# Patient Record
Sex: MALE | Race: WHITE | NOT HISPANIC OR LATINO | Employment: OTHER | URBAN - METROPOLITAN AREA
[De-identification: names, ages, dates, MRNs, and addresses within clinical notes are randomized per-mention and may not be internally consistent; named-entity substitution may affect disease eponyms.]

---

## 2024-01-01 ENCOUNTER — APPOINTMENT (OUTPATIENT)
Dept: RADIOLOGY | Facility: HOSPITAL | Age: 74
DRG: 871 | End: 2024-01-01
Payer: OTHER MISCELLANEOUS

## 2024-01-01 ENCOUNTER — APPOINTMENT (OUTPATIENT)
Dept: CARDIOLOGY | Facility: HOSPITAL | Age: 74
DRG: 871 | End: 2024-01-01
Payer: OTHER MISCELLANEOUS

## 2024-01-01 ENCOUNTER — HOSPITAL ENCOUNTER (INPATIENT)
Facility: HOSPITAL | Age: 74
LOS: 3 days | End: 2024-07-26
Attending: INTERNAL MEDICINE | Admitting: INTERNAL MEDICINE
Payer: OTHER MISCELLANEOUS

## 2024-01-01 VITALS
BODY MASS INDEX: 21.08 KG/M2 | HEIGHT: 70 IN | DIASTOLIC BLOOD PRESSURE: 67 MMHG | SYSTOLIC BLOOD PRESSURE: 107 MMHG | TEMPERATURE: 98.1 F | WEIGHT: 147.27 LBS | OXYGEN SATURATION: 98 % | HEART RATE: 107 BPM | RESPIRATION RATE: 19 BRPM

## 2024-01-01 DIAGNOSIS — Z51.5 HOSPICE CARE: ICD-10-CM

## 2024-01-01 PROCEDURE — 93970 EXTREMITY STUDY: CPT

## 2024-01-01 PROCEDURE — 99231 SBSQ HOSP IP/OBS SF/LOW 25: CPT

## 2024-01-01 PROCEDURE — 2500000004 HC RX 250 GENERAL PHARMACY W/ HCPCS (ALT 636 FOR OP/ED): Performed by: NURSE PRACTITIONER

## 2024-01-01 PROCEDURE — 2500000004 HC RX 250 GENERAL PHARMACY W/ HCPCS (ALT 636 FOR OP/ED): Performed by: INTERNAL MEDICINE

## 2024-01-01 PROCEDURE — 2500000005 HC RX 250 GENERAL PHARMACY W/O HCPCS: Performed by: NURSE PRACTITIONER

## 2024-01-01 PROCEDURE — 74018 RADEX ABDOMEN 1 VIEW: CPT

## 2024-01-01 PROCEDURE — 93005 ELECTROCARDIOGRAM TRACING: CPT

## 2024-01-01 PROCEDURE — 2500000004 HC RX 250 GENERAL PHARMACY W/ HCPCS (ALT 636 FOR OP/ED)

## 2024-01-01 PROCEDURE — 2500000001 HC RX 250 WO HCPCS SELF ADMINISTERED DRUGS (ALT 637 FOR MEDICARE OP): Performed by: NURSE PRACTITIONER

## 2024-01-01 PROCEDURE — 1150000001 HC HOSPICE PRIVATE ROOM DAILY

## 2024-01-01 PROCEDURE — 99238 HOSP IP/OBS DSCHRG MGMT 30/<: CPT | Performed by: INTERNAL MEDICINE

## 2024-01-01 PROCEDURE — 70450 CT HEAD/BRAIN W/O DYE: CPT

## 2024-01-01 PROCEDURE — 99223 1ST HOSP IP/OBS HIGH 75: CPT | Performed by: NURSE PRACTITIONER

## 2024-01-01 PROCEDURE — 71045 X-RAY EXAM CHEST 1 VIEW: CPT

## 2024-01-01 RX ORDER — GLYCOPYRROLATE 0.2 MG/ML
0.2 INJECTION INTRAMUSCULAR; INTRAVENOUS 4 TIMES DAILY
Status: DISCONTINUED | OUTPATIENT
Start: 2024-01-01 | End: 2024-01-01 | Stop reason: HOSPADM

## 2024-01-01 RX ORDER — MORPHINE SULFATE 2 MG/ML
2 INJECTION, SOLUTION INTRAMUSCULAR; INTRAVENOUS EVERY 2 HOUR PRN
Status: DISCONTINUED | OUTPATIENT
Start: 2024-01-01 | End: 2024-01-01

## 2024-01-01 RX ORDER — MORPHINE SULFATE 2 MG/ML
2 INJECTION, SOLUTION INTRAMUSCULAR; INTRAVENOUS EVERY 6 HOURS
Status: DISCONTINUED | OUTPATIENT
Start: 2024-01-01 | End: 2024-01-01

## 2024-01-01 RX ORDER — ACETAMINOPHEN 325 MG/1
650 TABLET ORAL EVERY 4 HOURS PRN
Status: DISCONTINUED | OUTPATIENT
Start: 2024-01-01 | End: 2024-01-01 | Stop reason: HOSPADM

## 2024-01-01 RX ORDER — LORAZEPAM 2 MG/ML
1 INJECTION INTRAMUSCULAR EVERY 4 HOURS PRN
Status: DISCONTINUED | OUTPATIENT
Start: 2024-01-01 | End: 2024-01-01 | Stop reason: HOSPADM

## 2024-01-01 RX ORDER — LORAZEPAM 2 MG/ML
0.5 INJECTION INTRAMUSCULAR EVERY 4 HOURS
Status: DISCONTINUED | OUTPATIENT
Start: 2024-01-01 | End: 2024-01-01

## 2024-01-01 RX ORDER — HEPARIN SODIUM,PORCINE/PF 10 UNIT/ML
50 SYRINGE (ML) INTRAVENOUS EVERY 12 HOURS
Status: DISCONTINUED | OUTPATIENT
Start: 2024-01-01 | End: 2024-01-01 | Stop reason: HOSPADM

## 2024-01-01 RX ORDER — ACETAMINOPHEN 160 MG/5ML
650 SOLUTION ORAL EVERY 4 HOURS PRN
Status: DISCONTINUED | OUTPATIENT
Start: 2024-01-01 | End: 2024-01-01 | Stop reason: HOSPADM

## 2024-01-01 RX ORDER — MORPHINE SULFATE 2 MG/ML
2 INJECTION, SOLUTION INTRAMUSCULAR; INTRAVENOUS EVERY 8 HOURS
Status: DISCONTINUED | OUTPATIENT
Start: 2024-01-01 | End: 2024-01-01

## 2024-01-01 RX ORDER — LORAZEPAM 2 MG/ML
0.5 INJECTION INTRAMUSCULAR EVERY 6 HOURS SCHEDULED
Status: DISCONTINUED | OUTPATIENT
Start: 2024-01-01 | End: 2024-01-01

## 2024-01-01 RX ORDER — MORPHINE SULFATE 2 MG/ML
2 INJECTION, SOLUTION INTRAMUSCULAR; INTRAVENOUS EVERY 4 HOURS
Status: DISCONTINUED | OUTPATIENT
Start: 2024-01-01 | End: 2024-01-01

## 2024-01-01 RX ORDER — MORPHINE SULFATE 2 MG/ML
2 INJECTION, SOLUTION INTRAMUSCULAR; INTRAVENOUS
Status: DISCONTINUED | OUTPATIENT
Start: 2024-01-01 | End: 2024-01-01 | Stop reason: HOSPADM

## 2024-01-01 RX ORDER — BISACODYL 10 MG/1
10 SUPPOSITORY RECTAL DAILY PRN
Status: DISCONTINUED | OUTPATIENT
Start: 2024-01-01 | End: 2024-01-01 | Stop reason: HOSPADM

## 2024-01-01 RX ORDER — SCOLOPAMINE TRANSDERMAL SYSTEM 1 MG/1
1 PATCH, EXTENDED RELEASE TRANSDERMAL
Status: DISCONTINUED | OUTPATIENT
Start: 2024-01-01 | End: 2024-01-01 | Stop reason: HOSPADM

## 2024-01-01 RX ORDER — HYOSCYAMINE SULFATE 0.5 MG/ML
0.25 INJECTION, SOLUTION SUBCUTANEOUS ONCE
Status: DISCONTINUED | OUTPATIENT
Start: 2024-01-01 | End: 2024-01-01

## 2024-01-01 RX ORDER — LORAZEPAM 2 MG/ML
0.5 INJECTION INTRAMUSCULAR EVERY 4 HOURS PRN
Status: DISCONTINUED | OUTPATIENT
Start: 2024-01-01 | End: 2024-01-01

## 2024-01-01 RX ORDER — BISACODYL 10 MG/1
10 SUPPOSITORY RECTAL ONCE
Status: COMPLETED | OUTPATIENT
Start: 2024-01-01 | End: 2024-01-01

## 2024-01-01 RX ORDER — ACETAMINOPHEN 650 MG/1
650 SUPPOSITORY RECTAL EVERY 4 HOURS PRN
Status: DISCONTINUED | OUTPATIENT
Start: 2024-01-01 | End: 2024-01-01 | Stop reason: HOSPADM

## 2024-01-01 RX ORDER — MORPHINE SULFATE 4 MG/ML
4 INJECTION INTRAVENOUS EVERY 4 HOURS
Status: DISCONTINUED | OUTPATIENT
Start: 2024-01-01 | End: 2024-01-01 | Stop reason: HOSPADM

## 2024-01-01 RX ORDER — HALOPERIDOL 2 MG/ML
1 SOLUTION ORAL EVERY 4 HOURS PRN
Status: DISCONTINUED | OUTPATIENT
Start: 2024-01-01 | End: 2024-01-01 | Stop reason: HOSPADM

## 2024-01-01 RX ORDER — HEPARIN SODIUM,PORCINE/PF 10 UNIT/ML
50 SYRINGE (ML) INTRAVENOUS AS NEEDED
Status: DISCONTINUED | OUTPATIENT
Start: 2024-01-01 | End: 2024-01-01 | Stop reason: HOSPADM

## 2024-01-01 RX ORDER — HYOSCYAMINE SULFATE 0.5 MG/ML
0.25 INJECTION, SOLUTION SUBCUTANEOUS EVERY 4 HOURS PRN
Status: DISCONTINUED | OUTPATIENT
Start: 2024-01-01 | End: 2024-01-01

## 2024-01-01 RX ORDER — LORAZEPAM 2 MG/ML
1 INJECTION INTRAMUSCULAR EVERY 4 HOURS
Status: DISCONTINUED | OUTPATIENT
Start: 2024-01-01 | End: 2024-01-01 | Stop reason: HOSPADM

## 2024-01-01 SDOH — SOCIAL STABILITY: SOCIAL INSECURITY: ARE THERE ANY APPARENT SIGNS OF INJURIES/BEHAVIORS THAT COULD BE RELATED TO ABUSE/NEGLECT?: UNABLE TO ASSESS

## 2024-01-01 SDOH — SOCIAL STABILITY: SOCIAL INSECURITY: ABUSE: ADULT

## 2024-01-01 SDOH — SOCIAL STABILITY: SOCIAL INSECURITY: HAS ANYONE EVER THREATENED TO HURT YOUR FAMILY OR YOUR PETS?: UNABLE TO ASSESS

## 2024-01-01 SDOH — SOCIAL STABILITY: SOCIAL INSECURITY: DOES ANYONE TRY TO KEEP YOU FROM HAVING/CONTACTING OTHER FRIENDS OR DOING THINGS OUTSIDE YOUR HOME?: UNABLE TO ASSESS

## 2024-01-01 SDOH — SOCIAL STABILITY: SOCIAL INSECURITY: HAVE YOU HAD ANY THOUGHTS OF HARMING ANYONE ELSE?: UNABLE TO ASSESS

## 2024-01-01 SDOH — SOCIAL STABILITY: SOCIAL INSECURITY: ARE YOU OR HAVE YOU BEEN THREATENED OR ABUSED PHYSICALLY, EMOTIONALLY, OR SEXUALLY BY ANYONE?: UNABLE TO ASSESS

## 2024-01-01 SDOH — SOCIAL STABILITY: SOCIAL INSECURITY: WERE YOU ABLE TO COMPLETE ALL THE BEHAVIORAL HEALTH SCREENINGS?: NO

## 2024-01-01 SDOH — SOCIAL STABILITY: SOCIAL INSECURITY: DO YOU FEEL UNSAFE GOING BACK TO THE PLACE WHERE YOU ARE LIVING?: UNABLE TO ASSESS

## 2024-01-01 SDOH — SOCIAL STABILITY: SOCIAL INSECURITY: DO YOU FEEL ANYONE HAS EXPLOITED OR TAKEN ADVANTAGE OF YOU FINANCIALLY OR OF YOUR PERSONAL PROPERTY?: UNABLE TO ASSESS

## 2024-01-01 SDOH — SOCIAL STABILITY: SOCIAL INSECURITY: HAVE YOU HAD THOUGHTS OF HARMING ANYONE ELSE?: UNABLE TO ASSESS

## 2024-01-01 ASSESSMENT — PAIN SCALES - PAIN ASSESSMENT IN ADVANCED DEMENTIA (PAINAD)
BODYLANGUAGE: TENSE, DISTRESSED PACING, FIDGETING
CONSOLABILITY: UNABLE TO CONSOLE, DISTRACT OR REASSURE
TOTALSCORE: 4
TOTALSCORE: MEDICATION (SEE MAR)
FACIALEXPRESSION: SMILING OR INEXPRESSIVE
BREATHING: NORMAL
CONSOLABILITY: UNABLE TO CONSOLE, DISTRACT OR REASSURE
BODYLANGUAGE: TENSE, DISTRESSED PACING, FIDGETING
TOTALSCORE: 4
BREATHING: OCCASIONAL LABORED BREATHING, SHORT PERIOD OF HYPERVENTILATION
FACIALEXPRESSION: SMILING OR INEXPRESSIVE
BREATHING: OCCASIONAL LABORED BREATHING, SHORT PERIOD OF HYPERVENTILATION
BODYLANGUAGE: RELAXED
CONSOLABILITY: NO NEED TO CONSOLE
TOTALSCORE: 0
FACIALEXPRESSION: SMILING OR INEXPRESSIVE
TOTALSCORE: MEDICATION (SEE MAR)

## 2024-01-01 ASSESSMENT — PAIN - FUNCTIONAL ASSESSMENT
PAIN_FUNCTIONAL_ASSESSMENT: UNABLE TO SELF-REPORT
PAIN_FUNCTIONAL_ASSESSMENT: 0-10
PAIN_FUNCTIONAL_ASSESSMENT: 0-10

## 2024-01-01 ASSESSMENT — COGNITIVE AND FUNCTIONAL STATUS - GENERAL
CLIMB 3 TO 5 STEPS WITH RAILING: TOTAL
STANDING UP FROM CHAIR USING ARMS: TOTAL
TURNING FROM BACK TO SIDE WHILE IN FLAT BAD: TOTAL
TOILETING: TOTAL
HELP NEEDED FOR BATHING: TOTAL
PERSONAL GROOMING: TOTAL
MOVING FROM LYING ON BACK TO SITTING ON SIDE OF FLAT BED WITH BEDRAILS: TOTAL
DRESSING REGULAR LOWER BODY CLOTHING: TOTAL
DAILY ACTIVITIY SCORE: 6
MOBILITY SCORE: 6
PERSONAL GROOMING: TOTAL
TOILETING: TOTAL
DRESSING REGULAR UPPER BODY CLOTHING: TOTAL
DAILY ACTIVITIY SCORE: 6
WALKING IN HOSPITAL ROOM: TOTAL
PERSONAL GROOMING: TOTAL
EATING MEALS: TOTAL
DRESSING REGULAR UPPER BODY CLOTHING: TOTAL
EATING MEALS: TOTAL
TOILETING: TOTAL
MOVING TO AND FROM BED TO CHAIR: TOTAL
TOILETING: TOTAL
STANDING UP FROM CHAIR USING ARMS: TOTAL
STANDING UP FROM CHAIR USING ARMS: TOTAL
CLIMB 3 TO 5 STEPS WITH RAILING: TOTAL
DRESSING REGULAR LOWER BODY CLOTHING: TOTAL
HELP NEEDED FOR BATHING: TOTAL
MOVING TO AND FROM BED TO CHAIR: TOTAL
CLIMB 3 TO 5 STEPS WITH RAILING: TOTAL
MOVING FROM LYING ON BACK TO SITTING ON SIDE OF FLAT BED WITH BEDRAILS: TOTAL
MOVING FROM LYING ON BACK TO SITTING ON SIDE OF FLAT BED WITH BEDRAILS: TOTAL
TOILETING: TOTAL
TURNING FROM BACK TO SIDE WHILE IN FLAT BAD: TOTAL
MOVING FROM LYING ON BACK TO SITTING ON SIDE OF FLAT BED WITH BEDRAILS: TOTAL
MOVING FROM LYING ON BACK TO SITTING ON SIDE OF FLAT BED WITH BEDRAILS: TOTAL
MOBILITY SCORE: 6
WALKING IN HOSPITAL ROOM: TOTAL
DRESSING REGULAR LOWER BODY CLOTHING: TOTAL
DAILY ACTIVITIY SCORE: 6
DRESSING REGULAR UPPER BODY CLOTHING: TOTAL
DRESSING REGULAR LOWER BODY CLOTHING: TOTAL
CLIMB 3 TO 5 STEPS WITH RAILING: TOTAL
WALKING IN HOSPITAL ROOM: TOTAL
DRESSING REGULAR UPPER BODY CLOTHING: TOTAL
DAILY ACTIVITIY SCORE: 6
MOVING FROM LYING ON BACK TO SITTING ON SIDE OF FLAT BED WITH BEDRAILS: TOTAL
DRESSING REGULAR LOWER BODY CLOTHING: TOTAL
MOBILITY SCORE: 6
MOVING TO AND FROM BED TO CHAIR: TOTAL
HELP NEEDED FOR BATHING: TOTAL
WALKING IN HOSPITAL ROOM: TOTAL
TURNING FROM BACK TO SIDE WHILE IN FLAT BAD: TOTAL
DRESSING REGULAR UPPER BODY CLOTHING: TOTAL
TURNING FROM BACK TO SIDE WHILE IN FLAT BAD: TOTAL
TURNING FROM BACK TO SIDE WHILE IN FLAT BAD: TOTAL
MOVING FROM LYING ON BACK TO SITTING ON SIDE OF FLAT BED WITH BEDRAILS: TOTAL
DRESSING REGULAR UPPER BODY CLOTHING: TOTAL
CLIMB 3 TO 5 STEPS WITH RAILING: TOTAL
STANDING UP FROM CHAIR USING ARMS: TOTAL
MOVING TO AND FROM BED TO CHAIR: TOTAL
WALKING IN HOSPITAL ROOM: TOTAL
HELP NEEDED FOR BATHING: TOTAL
DAILY ACTIVITIY SCORE: 6
CLIMB 3 TO 5 STEPS WITH RAILING: TOTAL
PERSONAL GROOMING: TOTAL
TOILETING: TOTAL
HELP NEEDED FOR BATHING: TOTAL
TURNING FROM BACK TO SIDE WHILE IN FLAT BAD: TOTAL
EATING MEALS: TOTAL
WALKING IN HOSPITAL ROOM: TOTAL
PERSONAL GROOMING: TOTAL
PERSONAL GROOMING: TOTAL
TOILETING: TOTAL
DAILY ACTIVITIY SCORE: 6
DRESSING REGULAR LOWER BODY CLOTHING: TOTAL
MOVING TO AND FROM BED TO CHAIR: TOTAL
EATING MEALS: TOTAL
WALKING IN HOSPITAL ROOM: TOTAL
MOBILITY SCORE: 6
MOVING TO AND FROM BED TO CHAIR: TOTAL
PERSONAL GROOMING: TOTAL
DAILY ACTIVITIY SCORE: 6
STANDING UP FROM CHAIR USING ARMS: TOTAL
EATING MEALS: TOTAL
EATING MEALS: TOTAL
STANDING UP FROM CHAIR USING ARMS: TOTAL
PATIENT BASELINE BEDBOUND: YES
MOBILITY SCORE: 6
HELP NEEDED FOR BATHING: TOTAL
DRESSING REGULAR UPPER BODY CLOTHING: TOTAL
HELP NEEDED FOR BATHING: TOTAL
DRESSING REGULAR LOWER BODY CLOTHING: TOTAL
CLIMB 3 TO 5 STEPS WITH RAILING: TOTAL
MOVING TO AND FROM BED TO CHAIR: TOTAL
EATING MEALS: TOTAL
STANDING UP FROM CHAIR USING ARMS: TOTAL
MOBILITY SCORE: 6
MOBILITY SCORE: 6
TURNING FROM BACK TO SIDE WHILE IN FLAT BAD: TOTAL

## 2024-01-01 ASSESSMENT — ACTIVITIES OF DAILY LIVING (ADL)
GROOMING: DEPENDENT
BATHING: DEPENDENT
WALKS IN HOME: DEPENDENT
DRESSING YOURSELF: DEPENDENT
HEARING - RIGHT EAR: UNABLE TO ASSESS
PATIENT'S MEMORY ADEQUATE TO SAFELY COMPLETE DAILY ACTIVITIES?: NO
JUDGMENT_ADEQUATE_SAFELY_COMPLETE_DAILY_ACTIVITIES: NO
HEARING - LEFT EAR: UNABLE TO ASSESS
LACK_OF_TRANSPORTATION: NO
ADEQUATE_TO_COMPLETE_ADL: UNABLE TO ASSESS
TOILETING: DEPENDENT
FEEDING YOURSELF: DEPENDENT

## 2024-01-01 ASSESSMENT — PATIENT HEALTH QUESTIONNAIRE - PHQ9
1. LITTLE INTEREST OR PLEASURE IN DOING THINGS: NOT AT ALL
2. FEELING DOWN, DEPRESSED OR HOPELESS: NOT AT ALL
SUM OF ALL RESPONSES TO PHQ9 QUESTIONS 1 & 2: 0

## 2024-01-01 ASSESSMENT — PAIN SCALES - WONG BAKER
WONGBAKER_NUMERICALRESPONSE: NO HURT
WONGBAKER_NUMERICALRESPONSE: NO HURT

## 2024-01-01 ASSESSMENT — PAIN DESCRIPTION - LOCATION: LOCATION: GENERALIZED

## 2024-01-01 ASSESSMENT — PAIN SCALES - GENERAL
PAINLEVEL_OUTOF10: 0 - NO PAIN

## 2024-01-01 ASSESSMENT — COLUMBIA-SUICIDE SEVERITY RATING SCALE - C-SSRS
6. HAVE YOU EVER DONE ANYTHING, STARTED TO DO ANYTHING, OR PREPARED TO DO ANYTHING TO END YOUR LIFE?: NO
2. HAVE YOU ACTUALLY HAD ANY THOUGHTS OF KILLING YOURSELF?: NO
1. IN THE PAST MONTH, HAVE YOU WISHED YOU WERE DEAD OR WISHED YOU COULD GO TO SLEEP AND NOT WAKE UP?: NO

## 2024-01-01 ASSESSMENT — LIFESTYLE VARIABLES
AUDIT-C TOTAL SCORE: -1
AUDIT-C TOTAL SCORE: -1
SKIP TO QUESTIONS 9-10: 0
HOW OFTEN DO YOU HAVE A DRINK CONTAINING ALCOHOL: PATIENT UNABLE TO ANSWER
HOW OFTEN DO YOU HAVE 6 OR MORE DRINKS ON ONE OCCASION: PATIENT UNABLE TO ANSWER
HOW MANY STANDARD DRINKS CONTAINING ALCOHOL DO YOU HAVE ON A TYPICAL DAY: PATIENT UNABLE TO ANSWER

## 2024-07-13 ENCOUNTER — APPOINTMENT (OUTPATIENT)
Dept: RADIOLOGY | Facility: HOSPITAL | Age: 74
DRG: 871 | End: 2024-07-13
Payer: OTHER MISCELLANEOUS

## 2024-07-13 ENCOUNTER — HOSPITAL ENCOUNTER (INPATIENT)
Facility: HOSPITAL | Age: 74
DRG: 871 | End: 2024-07-13
Attending: STUDENT IN AN ORGANIZED HEALTH CARE EDUCATION/TRAINING PROGRAM | Admitting: INTERNAL MEDICINE
Payer: OTHER MISCELLANEOUS

## 2024-07-13 DIAGNOSIS — R93.89 ABNORMAL CT SCAN: ICD-10-CM

## 2024-07-13 DIAGNOSIS — Z51.5 HOSPICE CARE: ICD-10-CM

## 2024-07-13 DIAGNOSIS — J18.9 PNEUMONIA DUE TO INFECTIOUS ORGANISM, UNSPECIFIED LATERALITY, UNSPECIFIED PART OF LUNG: ICD-10-CM

## 2024-07-13 DIAGNOSIS — A41.9 SEPSIS, DUE TO UNSPECIFIED ORGANISM, UNSPECIFIED WHETHER ACUTE ORGAN DYSFUNCTION PRESENT (MULTI): ICD-10-CM

## 2024-07-13 DIAGNOSIS — E27.9 LESION OF ADRENAL GLAND (MULTI): ICD-10-CM

## 2024-07-13 DIAGNOSIS — O22.30 DVT (DEEP VEIN THROMBOSIS) IN PREGNANCY (HHS-HCC): ICD-10-CM

## 2024-07-13 DIAGNOSIS — E87.0 HYPERNATREMIA: ICD-10-CM

## 2024-07-13 DIAGNOSIS — J96.91 RESPIRATORY FAILURE WITH HYPOXIA, UNSPECIFIED CHRONICITY (MULTI): ICD-10-CM

## 2024-07-13 DIAGNOSIS — I26.99 OTHER ACUTE PULMONARY EMBOLISM WITHOUT ACUTE COR PULMONALE: ICD-10-CM

## 2024-07-13 DIAGNOSIS — R91.8 LUNG MASS: ICD-10-CM

## 2024-07-13 DIAGNOSIS — R41.82 ALTERED MENTAL STATUS, UNSPECIFIED ALTERED MENTAL STATUS TYPE: ICD-10-CM

## 2024-07-13 LAB
ALBUMIN SERPL BCP-MCNC: 3.3 G/DL (ref 3.4–5)
ALP SERPL-CCNC: 131 U/L (ref 33–136)
ALT SERPL W P-5'-P-CCNC: 36 U/L (ref 10–52)
ANION GAP BLDA CALCULATED.4IONS-SCNC: 9 MMO/L (ref 10–25)
ANION GAP SERPL CALC-SCNC: 19 MMOL/L (ref 10–20)
APPEARANCE UR: ABNORMAL
AST SERPL W P-5'-P-CCNC: 43 U/L (ref 9–39)
BACTERIA #/AREA URNS AUTO: ABNORMAL /HPF
BASE EXCESS BLDA CALC-SCNC: -1.1 MMOL/L (ref -2–3)
BASE EXCESS BLDA CALC-SCNC: -3.9 MMOL/L (ref -2–3)
BASOPHILS # BLD AUTO: 0.06 X10*3/UL (ref 0–0.1)
BASOPHILS NFR BLD AUTO: 0.5 %
BILIRUB SERPL-MCNC: 0.6 MG/DL (ref 0–1.2)
BILIRUB UR STRIP.AUTO-MCNC: NEGATIVE MG/DL
BNP SERPL-MCNC: 105 PG/ML (ref 0–99)
BODY TEMPERATURE: ABNORMAL
BODY TEMPERATURE: ABNORMAL
BUN SERPL-MCNC: 96 MG/DL (ref 6–23)
CA-I BLDA-SCNC: 1.25 MMOL/L (ref 1.1–1.33)
CALCIUM SERPL-MCNC: 9.1 MG/DL (ref 8.6–10.3)
CARDIAC TROPONIN I PNL SERPL HS: 36 NG/L (ref 0–20)
CHLORIDE BLDA-SCNC: 132 MMOL/L (ref 98–107)
CHLORIDE SERPL-SCNC: 125 MMOL/L (ref 98–107)
CO2 SERPL-SCNC: 25 MMOL/L (ref 21–32)
COLOR UR: ABNORMAL
CREAT SERPL-MCNC: 4.14 MG/DL (ref 0.5–1.3)
D DIMER PPP FEU-MCNC: 1808 NG/ML FEU
EGFRCR SERPLBLD CKD-EPI 2021: 14 ML/MIN/1.73M*2
EOSINOPHIL # BLD AUTO: 0.11 X10*3/UL (ref 0–0.4)
EOSINOPHIL NFR BLD AUTO: 0.9 %
ERYTHROCYTE [DISTWIDTH] IN BLOOD BY AUTOMATED COUNT: 18.2 % (ref 11.5–14.5)
FIBRINOGEN PPP-MCNC: 510 MG/DL (ref 200–400)
FLUAV RNA RESP QL NAA+PROBE: NOT DETECTED
FLUBV RNA RESP QL NAA+PROBE: NOT DETECTED
GLUCOSE BLDA-MCNC: 152 MG/DL (ref 74–99)
GLUCOSE SERPL-MCNC: 132 MG/DL (ref 74–99)
GLUCOSE UR STRIP.AUTO-MCNC: NORMAL MG/DL
HCO3 BLDA-SCNC: 22.2 MMOL/L (ref 22–26)
HCO3 BLDA-SCNC: 24.8 MMOL/L (ref 22–26)
HCT VFR BLD AUTO: 43.8 % (ref 41–52)
HCT VFR BLD EST: 39 % (ref 41–52)
HGB BLD-MCNC: 12.7 G/DL (ref 13.5–17.5)
HGB BLDA-MCNC: 12.9 G/DL (ref 13.5–17.5)
HYALINE CASTS #/AREA URNS AUTO: ABNORMAL /LPF
IMM GRANULOCYTES # BLD AUTO: 0.04 X10*3/UL (ref 0–0.5)
IMM GRANULOCYTES NFR BLD AUTO: 0.3 % (ref 0–0.9)
INHALED O2 CONCENTRATION: 100 %
INHALED O2 CONCENTRATION: 80 %
INR PPP: 1.1 (ref 0.9–1.1)
KETONES UR STRIP.AUTO-MCNC: NEGATIVE MG/DL
LACTATE BLDA-SCNC: 1.5 MMOL/L (ref 0.4–2)
LACTATE SERPL-SCNC: 1.9 MMOL/L (ref 0.4–2)
LEUKOCYTE ESTERASE UR QL STRIP.AUTO: ABNORMAL
LYMPHOCYTES # BLD AUTO: 2.91 X10*3/UL (ref 0.8–3)
LYMPHOCYTES NFR BLD AUTO: 23 %
MCH RBC QN AUTO: 29.2 PG (ref 26–34)
MCHC RBC AUTO-ENTMCNC: 29 G/DL (ref 32–36)
MCV RBC AUTO: 101 FL (ref 80–100)
MONOCYTES # BLD AUTO: 0.91 X10*3/UL (ref 0.05–0.8)
MONOCYTES NFR BLD AUTO: 7.2 %
MUCOUS THREADS #/AREA URNS AUTO: ABNORMAL /LPF
NEUTROPHILS # BLD AUTO: 8.6 X10*3/UL (ref 1.6–5.5)
NEUTROPHILS NFR BLD AUTO: 68.1 %
NITRITE UR QL STRIP.AUTO: NEGATIVE
NRBC BLD-RTO: 0 /100 WBCS (ref 0–0)
OXYHGB MFR BLDA: 88.5 % (ref 94–98)
OXYHGB MFR BLDA: 97.4 % (ref 94–98)
PCO2 BLDA: 43 MM HG (ref 38–42)
PCO2 BLDA: 45 MM HG (ref 38–42)
PH BLDA: 7.32 PH (ref 7.38–7.42)
PH BLDA: 7.35 PH (ref 7.38–7.42)
PH UR STRIP.AUTO: 5.5 [PH]
PLATELET # BLD AUTO: 230 X10*3/UL (ref 150–450)
PO2 BLDA: 187 MM HG (ref 85–95)
PO2 BLDA: 65 MM HG (ref 85–95)
POTASSIUM BLDA-SCNC: 4.7 MMOL/L (ref 3.5–5.3)
POTASSIUM SERPL-SCNC: 4.3 MMOL/L (ref 3.5–5.3)
PROT SERPL-MCNC: 7.4 G/DL (ref 6.4–8.2)
PROT UR STRIP.AUTO-MCNC: ABNORMAL MG/DL
PROTHROMBIN TIME: 12.9 SECONDS (ref 9.8–12.8)
RBC # BLD AUTO: 4.35 X10*6/UL (ref 4.5–5.9)
RBC # UR STRIP.AUTO: ABNORMAL /UL
RBC #/AREA URNS AUTO: >20 /HPF
SAO2 % BLDA: 100 % (ref 94–100)
SAO2 % BLDA: 91 % (ref 94–100)
SARS-COV-2 RNA RESP QL NAA+PROBE: DETECTED
SODIUM BLDA-SCNC: 161 MMOL/L (ref 136–145)
SODIUM SERPL-SCNC: 165 MMOL/L (ref 136–145)
SP GR UR STRIP.AUTO: 1.02
SQUAMOUS #/AREA URNS AUTO: ABNORMAL /HPF
UROBILINOGEN UR STRIP.AUTO-MCNC: NORMAL MG/DL
WBC # BLD AUTO: 12.6 X10*3/UL (ref 4.4–11.3)
WBC #/AREA URNS AUTO: >50 /HPF

## 2024-07-13 PROCEDURE — 87636 SARSCOV2 & INF A&B AMP PRB: CPT | Performed by: STUDENT IN AN ORGANIZED HEALTH CARE EDUCATION/TRAINING PROGRAM

## 2024-07-13 PROCEDURE — 70450 CT HEAD/BRAIN W/O DYE: CPT | Performed by: STUDENT IN AN ORGANIZED HEALTH CARE EDUCATION/TRAINING PROGRAM

## 2024-07-13 PROCEDURE — 2020000001 HC ICU ROOM DAILY

## 2024-07-13 PROCEDURE — 2500000004 HC RX 250 GENERAL PHARMACY W/ HCPCS (ALT 636 FOR OP/ED)

## 2024-07-13 PROCEDURE — 99291 CRITICAL CARE FIRST HOUR: CPT

## 2024-07-13 PROCEDURE — 99291 CRITICAL CARE FIRST HOUR: CPT | Performed by: STUDENT IN AN ORGANIZED HEALTH CARE EDUCATION/TRAINING PROGRAM

## 2024-07-13 PROCEDURE — 5A0945A ASSISTANCE WITH RESPIRATORY VENTILATION, 24-96 CONSECUTIVE HOURS, HIGH NASAL FLOW/VELOCITY: ICD-10-PCS | Performed by: PHYSICIAN ASSISTANT

## 2024-07-13 PROCEDURE — 83735 ASSAY OF MAGNESIUM: CPT

## 2024-07-13 PROCEDURE — 36415 COLL VENOUS BLD VENIPUNCTURE: CPT | Performed by: PHYSICIAN ASSISTANT

## 2024-07-13 PROCEDURE — 94760 N-INVAS EAR/PLS OXIMETRY 1: CPT

## 2024-07-13 PROCEDURE — 84145 PROCALCITONIN (PCT): CPT | Mod: GEALAB

## 2024-07-13 PROCEDURE — 2500000002 HC RX 250 W HCPCS SELF ADMINISTERED DRUGS (ALT 637 FOR MEDICARE OP, ALT 636 FOR OP/ED): Performed by: STUDENT IN AN ORGANIZED HEALTH CARE EDUCATION/TRAINING PROGRAM

## 2024-07-13 PROCEDURE — 2500000004 HC RX 250 GENERAL PHARMACY W/ HCPCS (ALT 636 FOR OP/ED): Performed by: PHYSICIAN ASSISTANT

## 2024-07-13 PROCEDURE — 87449 NOS EACH ORGANISM AG IA: CPT | Mod: GEALAB

## 2024-07-13 PROCEDURE — 83605 ASSAY OF LACTIC ACID: CPT | Performed by: PHYSICIAN ASSISTANT

## 2024-07-13 PROCEDURE — 87081 CULTURE SCREEN ONLY: CPT | Mod: GEALAB

## 2024-07-13 PROCEDURE — 87086 URINE CULTURE/COLONY COUNT: CPT | Mod: GEALAB | Performed by: PHYSICIAN ASSISTANT

## 2024-07-13 PROCEDURE — 87040 BLOOD CULTURE FOR BACTERIA: CPT | Mod: GEALAB | Performed by: PHYSICIAN ASSISTANT

## 2024-07-13 PROCEDURE — 2500000004 HC RX 250 GENERAL PHARMACY W/ HCPCS (ALT 636 FOR OP/ED): Performed by: STUDENT IN AN ORGANIZED HEALTH CARE EDUCATION/TRAINING PROGRAM

## 2024-07-13 PROCEDURE — 94762 N-INVAS EAR/PLS OXIMTRY CONT: CPT

## 2024-07-13 PROCEDURE — 2500000005 HC RX 250 GENERAL PHARMACY W/O HCPCS: Performed by: PHYSICIAN ASSISTANT

## 2024-07-13 PROCEDURE — 83935 ASSAY OF URINE OSMOLALITY: CPT | Mod: GEALAB

## 2024-07-13 PROCEDURE — 2500000005 HC RX 250 GENERAL PHARMACY W/O HCPCS: Performed by: STUDENT IN AN ORGANIZED HEALTH CARE EDUCATION/TRAINING PROGRAM

## 2024-07-13 PROCEDURE — 82436 ASSAY OF URINE CHLORIDE: CPT | Mod: GEALAB

## 2024-07-13 PROCEDURE — 85025 COMPLETE CBC W/AUTO DIFF WBC: CPT | Performed by: PHYSICIAN ASSISTANT

## 2024-07-13 PROCEDURE — 80069 RENAL FUNCTION PANEL: CPT | Mod: CCI

## 2024-07-13 PROCEDURE — 85379 FIBRIN DEGRADATION QUANT: CPT

## 2024-07-13 PROCEDURE — 84132 ASSAY OF SERUM POTASSIUM: CPT | Performed by: PHYSICIAN ASSISTANT

## 2024-07-13 PROCEDURE — 84484 ASSAY OF TROPONIN QUANT: CPT

## 2024-07-13 PROCEDURE — 36600 WITHDRAWAL OF ARTERIAL BLOOD: CPT

## 2024-07-13 PROCEDURE — XW033E5 INTRODUCTION OF REMDESIVIR ANTI-INFECTIVE INTO PERIPHERAL VEIN, PERCUTANEOUS APPROACH, NEW TECHNOLOGY GROUP 5: ICD-10-PCS

## 2024-07-13 PROCEDURE — 81003 URINALYSIS AUTO W/O SCOPE: CPT | Performed by: PHYSICIAN ASSISTANT

## 2024-07-13 PROCEDURE — 84100 ASSAY OF PHOSPHORUS: CPT

## 2024-07-13 PROCEDURE — 96365 THER/PROPH/DIAG IV INF INIT: CPT

## 2024-07-13 PROCEDURE — 84484 ASSAY OF TROPONIN QUANT: CPT | Performed by: STUDENT IN AN ORGANIZED HEALTH CARE EDUCATION/TRAINING PROGRAM

## 2024-07-13 PROCEDURE — 83880 ASSAY OF NATRIURETIC PEPTIDE: CPT | Performed by: STUDENT IN AN ORGANIZED HEALTH CARE EDUCATION/TRAINING PROGRAM

## 2024-07-13 PROCEDURE — 94660 CPAP INITIATION&MGMT: CPT

## 2024-07-13 PROCEDURE — 96374 THER/PROPH/DIAG INJ IV PUSH: CPT | Mod: 59

## 2024-07-13 PROCEDURE — 71250 CT THORAX DX C-: CPT

## 2024-07-13 PROCEDURE — 85384 FIBRINOGEN ACTIVITY: CPT

## 2024-07-13 PROCEDURE — 94640 AIRWAY INHALATION TREATMENT: CPT

## 2024-07-13 PROCEDURE — 71250 CT THORAX DX C-: CPT | Performed by: STUDENT IN AN ORGANIZED HEALTH CARE EDUCATION/TRAINING PROGRAM

## 2024-07-13 PROCEDURE — 87899 AGENT NOS ASSAY W/OPTIC: CPT | Mod: GEALAB

## 2024-07-13 PROCEDURE — 9420000001 HC RT PATIENT EDUCATION 5 MIN

## 2024-07-13 PROCEDURE — 82805 BLOOD GASES W/O2 SATURATION: CPT | Performed by: PHYSICIAN ASSISTANT

## 2024-07-13 PROCEDURE — 85610 PROTHROMBIN TIME: CPT

## 2024-07-13 PROCEDURE — 37799 UNLISTED PX VASCULAR SURGERY: CPT

## 2024-07-13 PROCEDURE — 87641 MR-STAPH DNA AMP PROBE: CPT

## 2024-07-13 PROCEDURE — 3E0333Z INTRODUCTION OF ANTI-INFLAMMATORY INTO PERIPHERAL VEIN, PERCUTANEOUS APPROACH: ICD-10-PCS

## 2024-07-13 RX ORDER — ACETAMINOPHEN 650 MG/1
650 SUPPOSITORY RECTAL EVERY 4 HOURS PRN
Status: DISCONTINUED | OUTPATIENT
Start: 2024-07-13 | End: 2024-07-23 | Stop reason: HOSPADM

## 2024-07-13 RX ORDER — HEPARIN SODIUM 5000 [USP'U]/ML
5000 INJECTION, SOLUTION INTRAVENOUS; SUBCUTANEOUS EVERY 8 HOURS
Status: DISCONTINUED | OUTPATIENT
Start: 2024-07-13 | End: 2024-07-23 | Stop reason: HOSPADM

## 2024-07-13 RX ORDER — BISACODYL 10 MG/1
10 SUPPOSITORY RECTAL DAILY PRN
Status: DISCONTINUED | OUTPATIENT
Start: 2024-07-13 | End: 2024-07-23 | Stop reason: HOSPADM

## 2024-07-13 RX ORDER — DEXAMETHASONE 6 MG/1
6 TABLET ORAL DAILY
Status: CANCELLED | OUTPATIENT
Start: 2024-07-13 | End: 2024-07-24

## 2024-07-13 RX ORDER — ROSUVASTATIN CALCIUM 10 MG/1
10 TABLET, COATED ORAL DAILY
COMMUNITY
End: 2024-07-26 | Stop reason: HOSPADM

## 2024-07-13 RX ORDER — POTASSIUM CHLORIDE 20 MEQ/1
20 TABLET, EXTENDED RELEASE ORAL 2 TIMES DAILY
COMMUNITY
End: 2024-07-26 | Stop reason: HOSPADM

## 2024-07-13 RX ORDER — TAMSULOSIN HYDROCHLORIDE 0.4 MG/1
0.4 CAPSULE ORAL NIGHTLY
Status: DISCONTINUED | OUTPATIENT
Start: 2024-07-14 | End: 2024-07-23 | Stop reason: HOSPADM

## 2024-07-13 RX ORDER — DEXAMETHASONE SODIUM PHOSPHATE 10 MG/ML
6 INJECTION INTRAMUSCULAR; INTRAVENOUS DAILY
Status: CANCELLED | OUTPATIENT
Start: 2024-07-13 | End: 2024-07-24

## 2024-07-13 RX ORDER — ADHESIVE BANDAGE
30 BANDAGE TOPICAL DAILY PRN
COMMUNITY
End: 2024-07-26 | Stop reason: HOSPADM

## 2024-07-13 RX ORDER — BISACODYL 10 MG/1
10 SUPPOSITORY RECTAL DAILY PRN
COMMUNITY
End: 2024-07-26 | Stop reason: HOSPADM

## 2024-07-13 RX ORDER — DEXTROSE MONOHYDRATE 50 MG/ML
75 INJECTION, SOLUTION INTRAVENOUS CONTINUOUS
Status: DISCONTINUED | OUTPATIENT
Start: 2024-07-13 | End: 2024-07-14

## 2024-07-13 RX ORDER — ROSUVASTATIN CALCIUM 10 MG/1
10 TABLET, COATED ORAL DAILY
Status: DISCONTINUED | OUTPATIENT
Start: 2024-07-14 | End: 2024-07-23 | Stop reason: HOSPADM

## 2024-07-13 RX ORDER — ENEMA 19; 7 G/133ML; G/133ML
1 ENEMA RECTAL DAILY
COMMUNITY
End: 2024-07-26 | Stop reason: HOSPADM

## 2024-07-13 RX ORDER — HYDROXYZINE HYDROCHLORIDE 25 MG/1
25 TABLET, FILM COATED ORAL EVERY 4 HOURS PRN
COMMUNITY
End: 2024-07-26 | Stop reason: HOSPADM

## 2024-07-13 RX ORDER — IPRATROPIUM BROMIDE AND ALBUTEROL SULFATE 2.5; .5 MG/3ML; MG/3ML
9 SOLUTION RESPIRATORY (INHALATION) ONCE
Status: COMPLETED | OUTPATIENT
Start: 2024-07-13 | End: 2024-07-13

## 2024-07-13 RX ORDER — IPRATROPIUM BROMIDE AND ALBUTEROL SULFATE 2.5; .5 MG/3ML; MG/3ML
SOLUTION RESPIRATORY (INHALATION)
Status: COMPLETED
Start: 2024-07-13 | End: 2024-07-13

## 2024-07-13 RX ORDER — SENNOSIDES 8.8 MG/5ML
10 LIQUID ORAL 2 TIMES DAILY
Status: CANCELLED | OUTPATIENT
Start: 2024-07-13

## 2024-07-13 RX ORDER — CEFTRIAXONE 1 G/1
1 INJECTION, POWDER, FOR SOLUTION INTRAMUSCULAR; INTRAVENOUS DAILY
COMMUNITY
Start: 2024-07-12 | End: 2024-07-15

## 2024-07-13 RX ORDER — VANCOMYCIN HYDROCHLORIDE 1 G/20ML
INJECTION, POWDER, LYOPHILIZED, FOR SOLUTION INTRAVENOUS DAILY PRN
Status: DISCONTINUED | OUTPATIENT
Start: 2024-07-13 | End: 2024-07-20

## 2024-07-13 RX ORDER — ASPIRIN 81 MG/1
81 TABLET ORAL DAILY
Status: DISCONTINUED | OUTPATIENT
Start: 2024-07-14 | End: 2024-07-19

## 2024-07-13 RX ORDER — ACETAMINOPHEN 160 MG/5ML
650 SOLUTION ORAL EVERY 4 HOURS PRN
Status: DISCONTINUED | OUTPATIENT
Start: 2024-07-13 | End: 2024-07-23 | Stop reason: HOSPADM

## 2024-07-13 RX ORDER — TAMSULOSIN HYDROCHLORIDE 0.4 MG/1
0.4 CAPSULE ORAL NIGHTLY
COMMUNITY
End: 2024-07-26 | Stop reason: HOSPADM

## 2024-07-13 RX ORDER — LORAZEPAM 0.5 MG/1
0.5 TABLET ORAL EVERY 12 HOURS PRN
COMMUNITY
Start: 2024-07-12 | End: 2024-07-26 | Stop reason: HOSPADM

## 2024-07-13 RX ORDER — DEXAMETHASONE SODIUM PHOSPHATE 10 MG/ML
6 INJECTION INTRAMUSCULAR; INTRAVENOUS EVERY 24 HOURS
Status: DISCONTINUED | OUTPATIENT
Start: 2024-07-13 | End: 2024-07-14

## 2024-07-13 RX ORDER — SENNOSIDES 8.8 MG/5ML
10 LIQUID ORAL 2 TIMES DAILY
Status: DISCONTINUED | OUTPATIENT
Start: 2024-07-13 | End: 2024-07-23 | Stop reason: HOSPADM

## 2024-07-13 RX ORDER — ACETAMINOPHEN 325 MG/1
650 TABLET ORAL EVERY 6 HOURS PRN
COMMUNITY
End: 2024-07-26 | Stop reason: HOSPADM

## 2024-07-13 RX ORDER — FERROUS FUMARATE 324(106)MG
1 TABLET ORAL DAILY
COMMUNITY
End: 2024-07-26 | Stop reason: HOSPADM

## 2024-07-13 RX ORDER — MIRTAZAPINE 7.5 MG/1
7.5 TABLET, FILM COATED ORAL NIGHTLY
COMMUNITY
End: 2024-07-26 | Stop reason: HOSPADM

## 2024-07-13 RX ORDER — HEPARIN SODIUM 5000 [USP'U]/ML
5000 INJECTION, SOLUTION INTRAVENOUS; SUBCUTANEOUS EVERY 8 HOURS
Status: DISCONTINUED | OUTPATIENT
Start: 2024-07-13 | End: 2024-07-13

## 2024-07-13 RX ORDER — ASPIRIN 81 MG/1
81 TABLET ORAL DAILY
COMMUNITY
End: 2024-07-26 | Stop reason: HOSPADM

## 2024-07-13 RX ORDER — FERROUS SULFATE 325(65) MG
1 TABLET ORAL DAILY
Status: DISCONTINUED | OUTPATIENT
Start: 2024-07-14 | End: 2024-07-14

## 2024-07-13 RX ORDER — DEXAMETHASONE 0.5 MG/5ML
6 ELIXIR ORAL DAILY
Status: CANCELLED | OUTPATIENT
Start: 2024-07-13 | End: 2024-07-24

## 2024-07-13 RX ORDER — ALBUTEROL SULFATE 90 UG/1
2 INHALANT RESPIRATORY (INHALATION) EVERY 6 HOURS PRN
Status: DISCONTINUED | OUTPATIENT
Start: 2024-07-13 | End: 2024-07-23 | Stop reason: HOSPADM

## 2024-07-13 RX ORDER — ACETAMINOPHEN 325 MG/1
650 TABLET ORAL EVERY 4 HOURS PRN
Status: DISCONTINUED | OUTPATIENT
Start: 2024-07-13 | End: 2024-07-23 | Stop reason: HOSPADM

## 2024-07-13 RX ADMIN — Medication 60 L/MIN: at 18:26

## 2024-07-13 RX ADMIN — IPRATROPIUM BROMIDE AND ALBUTEROL SULFATE 9 ML: 2.5; .5 SOLUTION RESPIRATORY (INHALATION) at 18:15

## 2024-07-13 RX ADMIN — DEXTROSE MONOHYDRATE 100 ML/HR: 50 INJECTION, SOLUTION INTRAVENOUS at 20:56

## 2024-07-13 RX ADMIN — IPRATROPIUM BROMIDE AND ALBUTEROL SULFATE 9 ML: .5; 3 SOLUTION RESPIRATORY (INHALATION) at 18:15

## 2024-07-13 RX ADMIN — SODIUM CHLORIDE, POTASSIUM CHLORIDE, SODIUM LACTATE AND CALCIUM CHLORIDE 1000 ML: 600; 310; 30; 20 INJECTION, SOLUTION INTRAVENOUS at 19:18

## 2024-07-13 RX ADMIN — VANCOMYCIN HYDROCHLORIDE 1.5 G: 10 INJECTION, POWDER, LYOPHILIZED, FOR SOLUTION INTRAVENOUS at 19:19

## 2024-07-13 RX ADMIN — PIPERACILLIN SODIUM AND TAZOBACTAM SODIUM 4.5 G: 4; .5 INJECTION, SOLUTION INTRAVENOUS at 18:41

## 2024-07-13 RX ADMIN — HEPARIN SODIUM 5000 UNITS: 5000 INJECTION INTRAVENOUS; SUBCUTANEOUS at 23:10

## 2024-07-13 RX ADMIN — DEXAMETHASONE SODIUM PHOSPHATE 6 MG: 10 INJECTION INTRAMUSCULAR; INTRAVENOUS at 23:09

## 2024-07-13 SDOH — SOCIAL STABILITY: SOCIAL INSECURITY: HAVE YOU HAD THOUGHTS OF HARMING ANYONE ELSE?: UNABLE TO ASSESS

## 2024-07-13 SDOH — SOCIAL STABILITY: SOCIAL INSECURITY: WERE YOU ABLE TO COMPLETE ALL THE BEHAVIORAL HEALTH SCREENINGS?: NO

## 2024-07-13 ASSESSMENT — PAIN SCALES - PAIN ASSESSMENT IN ADVANCED DEMENTIA (PAINAD)
CONSOLABILITY: NO NEED TO CONSOLE
FACIALEXPRESSION: SMILING OR INEXPRESSIVE
TOTALSCORE: 0
BREATHING: NORMAL
BODYLANGUAGE: RELAXED

## 2024-07-13 ASSESSMENT — COGNITIVE AND FUNCTIONAL STATUS - GENERAL
DRESSING REGULAR LOWER BODY CLOTHING: TOTAL
DRESSING REGULAR UPPER BODY CLOTHING: TOTAL
STANDING UP FROM CHAIR USING ARMS: TOTAL
TURNING FROM BACK TO SIDE WHILE IN FLAT BAD: TOTAL
HELP NEEDED FOR BATHING: TOTAL
WALKING IN HOSPITAL ROOM: TOTAL
MOVING FROM LYING ON BACK TO SITTING ON SIDE OF FLAT BED WITH BEDRAILS: TOTAL
MOBILITY SCORE: 6
MOVING TO AND FROM BED TO CHAIR: TOTAL
EATING MEALS: TOTAL
DAILY ACTIVITIY SCORE: 6
CLIMB 3 TO 5 STEPS WITH RAILING: TOTAL
PATIENT BASELINE BEDBOUND: NO
PERSONAL GROOMING: TOTAL
TOILETING: TOTAL

## 2024-07-13 ASSESSMENT — ACTIVITIES OF DAILY LIVING (ADL)
PATIENT'S MEMORY ADEQUATE TO SAFELY COMPLETE DAILY ACTIVITIES?: UNABLE TO ASSESS
ADEQUATE_TO_COMPLETE_ADL: UNABLE TO ASSESS
DRESSING YOURSELF: DEPENDENT
JUDGMENT_ADEQUATE_SAFELY_COMPLETE_DAILY_ACTIVITIES: UNABLE TO ASSESS
WALKS IN HOME: DEPENDENT
FEEDING YOURSELF: DEPENDENT
HEARING - RIGHT EAR: UNABLE TO ASSESS
GROOMING: DEPENDENT
HEARING - LEFT EAR: UNABLE TO ASSESS
BATHING: DEPENDENT
TOILETING: DEPENDENT

## 2024-07-13 ASSESSMENT — LIFESTYLE VARIABLES
HOW OFTEN DO YOU HAVE A DRINK CONTAINING ALCOHOL: PATIENT UNABLE TO ANSWER
AUDIT-C TOTAL SCORE: -1
HOW OFTEN DO YOU HAVE 6 OR MORE DRINKS ON ONE OCCASION: PATIENT UNABLE TO ANSWER
AUDIT-C TOTAL SCORE: -1
HOW MANY STANDARD DRINKS CONTAINING ALCOHOL DO YOU HAVE ON A TYPICAL DAY: PATIENT UNABLE TO ANSWER
SKIP TO QUESTIONS 9-10: 0

## 2024-07-13 ASSESSMENT — PAIN - FUNCTIONAL ASSESSMENT: PAIN_FUNCTIONAL_ASSESSMENT: PAINAD (PAIN ASSESSMENT IN ADVANCED DEMENTIA SCALE)

## 2024-07-14 ENCOUNTER — APPOINTMENT (OUTPATIENT)
Dept: RADIOLOGY | Facility: HOSPITAL | Age: 74
DRG: 871 | End: 2024-07-14
Payer: OTHER MISCELLANEOUS

## 2024-07-14 VITALS
BODY MASS INDEX: 20.29 KG/M2 | OXYGEN SATURATION: 97 % | HEIGHT: 70 IN | SYSTOLIC BLOOD PRESSURE: 84 MMHG | HEART RATE: 63 BPM | RESPIRATION RATE: 18 BRPM | DIASTOLIC BLOOD PRESSURE: 65 MMHG | TEMPERATURE: 97.2 F | WEIGHT: 141.76 LBS

## 2024-07-14 PROBLEM — C34.90 LUNG CANCER (MULTI): Status: ACTIVE | Noted: 2024-01-01

## 2024-07-14 PROBLEM — E87.0 HYPERNATREMIA: Status: ACTIVE | Noted: 2024-01-01

## 2024-07-14 PROBLEM — U07.1 COVID: Status: ACTIVE | Noted: 2024-07-14

## 2024-07-14 LAB
ALBUMIN SERPL BCP-MCNC: 2.3 G/DL (ref 3.4–5)
ALBUMIN SERPL BCP-MCNC: 2.3 G/DL (ref 3.4–5)
ALBUMIN SERPL BCP-MCNC: 2.4 G/DL (ref 3.4–5)
ALBUMIN SERPL BCP-MCNC: 2.6 G/DL (ref 3.4–5)
ALP SERPL-CCNC: 101 U/L (ref 33–136)
ALT SERPL W P-5'-P-CCNC: 32 U/L (ref 10–52)
ANION GAP SERPL CALC-SCNC: 13 MMOL/L (ref 10–20)
ANION GAP SERPL CALC-SCNC: 14 MMOL/L (ref 10–20)
ANION GAP SERPL CALC-SCNC: 15 MMOL/L (ref 10–20)
ANION GAP SERPL CALC-SCNC: 15 MMOL/L (ref 10–20)
APTT PPP: 25 SECONDS (ref 27–38)
AST SERPL W P-5'-P-CCNC: 38 U/L (ref 9–39)
BASE EXCESS BLDV CALC-SCNC: -2.6 MMOL/L (ref -2–3)
BILIRUB SERPL-MCNC: 0.9 MG/DL (ref 0–1.2)
BODY TEMPERATURE: ABNORMAL
BUN SERPL-MCNC: 78 MG/DL (ref 6–23)
BUN SERPL-MCNC: 83 MG/DL (ref 6–23)
BUN SERPL-MCNC: 87 MG/DL (ref 6–23)
BUN SERPL-MCNC: 93 MG/DL (ref 6–23)
CALCIUM SERPL-MCNC: 7.6 MG/DL (ref 8.6–10.3)
CALCIUM SERPL-MCNC: 7.6 MG/DL (ref 8.6–10.3)
CALCIUM SERPL-MCNC: 7.9 MG/DL (ref 8.6–10.3)
CALCIUM SERPL-MCNC: 8 MG/DL (ref 8.6–10.3)
CARDIAC TROPONIN I PNL SERPL HS: 40 NG/L (ref 0–20)
CARDIAC TROPONIN I PNL SERPL HS: 44 NG/L (ref 0–20)
CHLORIDE SERPL-SCNC: 117 MMOL/L (ref 98–107)
CHLORIDE SERPL-SCNC: 119 MMOL/L (ref 98–107)
CHLORIDE SERPL-SCNC: 119 MMOL/L (ref 98–107)
CHLORIDE SERPL-SCNC: 124 MMOL/L (ref 98–107)
CHLORIDE UR-SCNC: 30 MMOL/L
CHLORIDE/CREATININE (MMOL/G) IN URINE: 22 MMOL/G CREAT (ref 23–275)
CO2 SERPL-SCNC: 23 MMOL/L (ref 21–32)
CO2 SERPL-SCNC: 24 MMOL/L (ref 21–32)
CREAT SERPL-MCNC: 2.92 MG/DL (ref 0.5–1.3)
CREAT SERPL-MCNC: 3.14 MG/DL (ref 0.5–1.3)
CREAT SERPL-MCNC: 3.41 MG/DL (ref 0.5–1.3)
CREAT SERPL-MCNC: 3.64 MG/DL (ref 0.5–1.3)
CREAT UR-MCNC: 137.1 MG/DL (ref 20–370)
EGFRCR SERPLBLD CKD-EPI 2021: 17 ML/MIN/1.73M*2
EGFRCR SERPLBLD CKD-EPI 2021: 18 ML/MIN/1.73M*2
EGFRCR SERPLBLD CKD-EPI 2021: 20 ML/MIN/1.73M*2
EGFRCR SERPLBLD CKD-EPI 2021: 22 ML/MIN/1.73M*2
ERYTHROCYTE [DISTWIDTH] IN BLOOD BY AUTOMATED COUNT: 17.5 % (ref 11.5–14.5)
FERRITIN SERPL-MCNC: 728 NG/ML (ref 20–300)
GLUCOSE BLD MANUAL STRIP-MCNC: 117 MG/DL (ref 74–99)
GLUCOSE BLD MANUAL STRIP-MCNC: 130 MG/DL (ref 74–99)
GLUCOSE BLD MANUAL STRIP-MCNC: 149 MG/DL (ref 74–99)
GLUCOSE SERPL-MCNC: 111 MG/DL (ref 74–99)
GLUCOSE SERPL-MCNC: 157 MG/DL (ref 74–99)
GLUCOSE SERPL-MCNC: 162 MG/DL (ref 74–99)
GLUCOSE SERPL-MCNC: 172 MG/DL (ref 74–99)
HCO3 BLDV-SCNC: 21.8 MMOL/L (ref 22–26)
HCT VFR BLD AUTO: 31.4 % (ref 41–52)
HGB BLD-MCNC: 9.2 G/DL (ref 13.5–17.5)
HOLD SPECIMEN: NORMAL
INHALED O2 CONCENTRATION: 44 %
LEGIONELLA AG UR QL: NEGATIVE
MAGNESIUM SERPL-MCNC: 2.25 MG/DL (ref 1.6–2.4)
MAGNESIUM SERPL-MCNC: 2.51 MG/DL (ref 1.6–2.4)
MCH RBC QN AUTO: 29.1 PG (ref 26–34)
MCHC RBC AUTO-ENTMCNC: 29.3 G/DL (ref 32–36)
MCV RBC AUTO: 99 FL (ref 80–100)
MRSA DNA SPEC QL NAA+PROBE: DETECTED
NRBC BLD-RTO: 0 /100 WBCS (ref 0–0)
OSMOLALITY UR: 449 MOSM/KG (ref 200–1200)
OXYHGB MFR BLDV: 39.9 % (ref 45–75)
PCO2 BLDV: 36 MM HG (ref 41–51)
PH BLDV: 7.39 PH (ref 7.33–7.43)
PHOSPHATE SERPL-MCNC: 3.1 MG/DL (ref 2.5–4.9)
PHOSPHATE SERPL-MCNC: 3.1 MG/DL (ref 2.5–4.9)
PHOSPHATE SERPL-MCNC: 3.2 MG/DL (ref 2.5–4.9)
PHOSPHATE SERPL-MCNC: 4.5 MG/DL (ref 2.5–4.9)
PHOSPHATE SERPL-MCNC: 4.5 MG/DL (ref 2.5–4.9)
PLATELET # BLD AUTO: 136 X10*3/UL (ref 150–450)
PO2 BLDV: 26 MM HG (ref 35–45)
POTASSIUM SERPL-SCNC: 3.8 MMOL/L (ref 3.5–5.3)
POTASSIUM SERPL-SCNC: 4 MMOL/L (ref 3.5–5.3)
POTASSIUM SERPL-SCNC: 4.1 MMOL/L (ref 3.5–5.3)
POTASSIUM SERPL-SCNC: 4.1 MMOL/L (ref 3.5–5.3)
POTASSIUM UR-SCNC: 59 MMOL/L
POTASSIUM/CREAT UR-RTO: 43 MMOL/G CREAT
PROCALCITONIN SERPL-MCNC: 0.29 NG/ML
PROT SERPL-MCNC: 5.3 G/DL (ref 6.4–8.2)
RBC # BLD AUTO: 3.16 X10*6/UL (ref 4.5–5.9)
S PNEUM AG UR QL: NEGATIVE
SAO2 % BLDV: 40 % (ref 45–75)
SODIUM SERPL-SCNC: 151 MMOL/L (ref 136–145)
SODIUM SERPL-SCNC: 152 MMOL/L (ref 136–145)
SODIUM SERPL-SCNC: 153 MMOL/L (ref 136–145)
SODIUM SERPL-SCNC: 157 MMOL/L (ref 136–145)
SODIUM UR-SCNC: 39 MMOL/L
SODIUM/CREAT UR-RTO: 28 MMOL/G CREAT
VANCOMYCIN SERPL-MCNC: 13 UG/ML (ref 5–20)
WBC # BLD AUTO: 8.2 X10*3/UL (ref 4.4–11.3)

## 2024-07-14 PROCEDURE — 82947 ASSAY GLUCOSE BLOOD QUANT: CPT

## 2024-07-14 PROCEDURE — 2020000001 HC ICU ROOM DAILY

## 2024-07-14 PROCEDURE — 82565 ASSAY OF CREATININE: CPT

## 2024-07-14 PROCEDURE — 80069 RENAL FUNCTION PANEL: CPT | Mod: CCI

## 2024-07-14 PROCEDURE — 99291 CRITICAL CARE FIRST HOUR: CPT

## 2024-07-14 PROCEDURE — 2500000004 HC RX 250 GENERAL PHARMACY W/ HCPCS (ALT 636 FOR OP/ED)

## 2024-07-14 PROCEDURE — 37799 UNLISTED PX VASCULAR SURGERY: CPT

## 2024-07-14 PROCEDURE — 2500000005 HC RX 250 GENERAL PHARMACY W/O HCPCS

## 2024-07-14 PROCEDURE — 2500000001 HC RX 250 WO HCPCS SELF ADMINISTERED DRUGS (ALT 637 FOR MEDICARE OP)

## 2024-07-14 PROCEDURE — 84100 ASSAY OF PHOSPHORUS: CPT

## 2024-07-14 PROCEDURE — 82728 ASSAY OF FERRITIN: CPT

## 2024-07-14 PROCEDURE — 85027 COMPLETE CBC AUTOMATED: CPT

## 2024-07-14 PROCEDURE — 93970 EXTREMITY STUDY: CPT | Performed by: RADIOLOGY

## 2024-07-14 PROCEDURE — 71275 CT ANGIOGRAPHY CHEST: CPT

## 2024-07-14 PROCEDURE — 85730 THROMBOPLASTIN TIME PARTIAL: CPT

## 2024-07-14 PROCEDURE — 71275 CT ANGIOGRAPHY CHEST: CPT | Performed by: RADIOLOGY

## 2024-07-14 PROCEDURE — 84484 ASSAY OF TROPONIN QUANT: CPT

## 2024-07-14 PROCEDURE — 80202 ASSAY OF VANCOMYCIN: CPT

## 2024-07-14 PROCEDURE — 83735 ASSAY OF MAGNESIUM: CPT

## 2024-07-14 PROCEDURE — 2500000005 HC RX 250 GENERAL PHARMACY W/O HCPCS: Mod: JZ

## 2024-07-14 PROCEDURE — 2550000001 HC RX 255 CONTRASTS: Performed by: INTERNAL MEDICINE

## 2024-07-14 PROCEDURE — 82805 BLOOD GASES W/O2 SATURATION: CPT

## 2024-07-14 RX ORDER — HYDROXYZINE HYDROCHLORIDE 25 MG/ML
50 INJECTION, SOLUTION INTRAMUSCULAR ONCE
Status: COMPLETED | OUTPATIENT
Start: 2024-07-14 | End: 2024-07-14

## 2024-07-14 RX ORDER — DEXTROSE 50 % IN WATER (D50W) INTRAVENOUS SYRINGE
12.5
Status: DISCONTINUED | OUTPATIENT
Start: 2024-07-14 | End: 2024-07-23 | Stop reason: HOSPADM

## 2024-07-14 RX ORDER — VANCOMYCIN HYDROCHLORIDE 500 MG/100ML
500 INJECTION, SOLUTION INTRAVENOUS ONCE
Status: COMPLETED | OUTPATIENT
Start: 2024-07-14 | End: 2024-07-14

## 2024-07-14 RX ORDER — INSULIN LISPRO 100 [IU]/ML
0-5 INJECTION, SOLUTION INTRAVENOUS; SUBCUTANEOUS EVERY 4 HOURS
Status: DISCONTINUED | OUTPATIENT
Start: 2024-07-14 | End: 2024-07-14

## 2024-07-14 RX ORDER — DEXAMETHASONE SODIUM PHOSPHATE 10 MG/ML
6 INJECTION INTRAMUSCULAR; INTRAVENOUS EVERY 24 HOURS
Status: DISCONTINUED | OUTPATIENT
Start: 2024-07-14 | End: 2024-07-18

## 2024-07-14 RX ORDER — INSULIN LISPRO 100 [IU]/ML
0-5 INJECTION, SOLUTION INTRAVENOUS; SUBCUTANEOUS EVERY 6 HOURS
Status: DISCONTINUED | OUTPATIENT
Start: 2024-07-14 | End: 2024-07-23 | Stop reason: HOSPADM

## 2024-07-14 RX ORDER — MUPIROCIN 20 MG/G
OINTMENT TOPICAL 2 TIMES DAILY
Status: DISCONTINUED | OUTPATIENT
Start: 2024-07-14 | End: 2024-07-15

## 2024-07-14 RX ORDER — DEXTROSE MONOHYDRATE 50 MG/ML
100 INJECTION, SOLUTION INTRAVENOUS CONTINUOUS
Status: ACTIVE | OUTPATIENT
Start: 2024-07-14 | End: 2024-07-15

## 2024-07-14 RX ADMIN — REMDESIVIR 200 MG: 100 INJECTION, POWDER, LYOPHILIZED, FOR SOLUTION INTRAVENOUS at 03:00

## 2024-07-14 RX ADMIN — PIPERACILLIN SODIUM AND TAZOBACTAM SODIUM 2.25 G: 2; .25 INJECTION, SOLUTION INTRAVENOUS at 12:04

## 2024-07-14 RX ADMIN — Medication 6 L/MIN: at 08:00

## 2024-07-14 RX ADMIN — Medication 15 ML: at 18:56

## 2024-07-14 RX ADMIN — DEXTROSE MONOHYDRATE 75 ML/HR: 50 INJECTION, SOLUTION INTRAVENOUS at 14:10

## 2024-07-14 RX ADMIN — Medication 3 L/MIN: at 19:00

## 2024-07-14 RX ADMIN — PIPERACILLIN SODIUM AND TAZOBACTAM SODIUM 2.25 G: 2; .25 INJECTION, SOLUTION INTRAVENOUS at 01:16

## 2024-07-14 RX ADMIN — MUPIROCIN: 20 OINTMENT TOPICAL at 20:02

## 2024-07-14 RX ADMIN — DEXTROSE MONOHYDRATE 150 ML/HR: 50 INJECTION, SOLUTION INTRAVENOUS at 05:34

## 2024-07-14 RX ADMIN — PIPERACILLIN SODIUM AND TAZOBACTAM SODIUM 2.25 G: 2; .25 INJECTION, SOLUTION INTRAVENOUS at 08:06

## 2024-07-14 RX ADMIN — HYDROXYZINE HYDROCHLORIDE 50 MG: 25 INJECTION, SOLUTION INTRAMUSCULAR at 02:06

## 2024-07-14 RX ADMIN — PIPERACILLIN SODIUM AND TAZOBACTAM SODIUM 2.25 G: 2; .25 INJECTION, SOLUTION INTRAVENOUS at 18:56

## 2024-07-14 RX ADMIN — IOHEXOL 65 ML: 350 INJECTION, SOLUTION INTRAVENOUS at 02:41

## 2024-07-14 RX ADMIN — HEPARIN SODIUM 5000 UNITS: 5000 INJECTION INTRAVENOUS; SUBCUTANEOUS at 08:06

## 2024-07-14 RX ADMIN — VANCOMYCIN HYDROCHLORIDE 500 MG: 500 INJECTION, SOLUTION INTRAVENOUS at 19:14

## 2024-07-14 RX ADMIN — AZITHROMYCIN MONOHYDRATE 500 MG: 500 INJECTION, POWDER, LYOPHILIZED, FOR SOLUTION INTRAVENOUS at 00:20

## 2024-07-14 RX ADMIN — HEPARIN SODIUM 5000 UNITS: 5000 INJECTION INTRAVENOUS; SUBCUTANEOUS at 14:09

## 2024-07-14 ASSESSMENT — COGNITIVE AND FUNCTIONAL STATUS - GENERAL
TURNING FROM BACK TO SIDE WHILE IN FLAT BAD: TOTAL
STANDING UP FROM CHAIR USING ARMS: TOTAL
MOVING TO AND FROM BED TO CHAIR: TOTAL
WALKING IN HOSPITAL ROOM: TOTAL
MOVING FROM LYING ON BACK TO SITTING ON SIDE OF FLAT BED WITH BEDRAILS: TOTAL
PERSONAL GROOMING: TOTAL
CLIMB 3 TO 5 STEPS WITH RAILING: TOTAL
MOBILITY SCORE: 6
DRESSING REGULAR UPPER BODY CLOTHING: TOTAL
DAILY ACTIVITIY SCORE: 6
EATING MEALS: TOTAL
TOILETING: TOTAL
DRESSING REGULAR LOWER BODY CLOTHING: TOTAL
HELP NEEDED FOR BATHING: TOTAL

## 2024-07-14 ASSESSMENT — ENCOUNTER SYMPTOMS
APPETITE CHANGE: 1
TROUBLE SWALLOWING: 1
SHORTNESS OF BREATH: 1
FATIGUE: 1
UNEXPECTED WEIGHT CHANGE: 1
CONFUSION: 1
SORE THROAT: 1
WOUND: 1
COUGH: 1
CHOKING: 1

## 2024-07-14 ASSESSMENT — ACTIVITIES OF DAILY LIVING (ADL)
LACK_OF_TRANSPORTATION: PATIENT UNABLE TO ANSWER
LACK_OF_TRANSPORTATION: NO

## 2024-07-14 ASSESSMENT — PAIN SCALES - WONG BAKER: WONGBAKER_NUMERICALRESPONSE: NO HURT

## 2024-07-14 ASSESSMENT — PAIN - FUNCTIONAL ASSESSMENT: PAIN_FUNCTIONAL_ASSESSMENT: CPOT (CRITICAL CARE PAIN OBSERVATION TOOL)

## 2024-07-14 NOTE — PROGRESS NOTES
07/14/24 1041   Discharge Planning   Living Arrangements Other (Comment)  (Patient is from [facility])   Support Systems Children;Family members   Assistance Needed Patient is from [facility], Family moved the patient from a facility in [place] to [facility] here in [place], patient is under his Veterans insurance, Patient is + for COVIDx 1 month, 6 Liters high flow of O2 at this time. Family completing medical release forms to get information from when patient was in [place] and receiving Chemotherapy for his cancer, and another release form for information regarding Chemotherapy he received here in [place].   Type of Residence Private residence   Number of Stairs to Enter Residence 0   Number of Stairs Within Residence 0   Do you have animals or pets at home? No   Who is requesting discharge planning? Provider   Home or Post Acute Services Post acute facilities (Rehab/SNF/etc)   Type of Post Acute Facility Services Skilled nursing   Expected Discharge Disposition SNF   Does the patient need discharge transport arranged? Yes   RoundTrip coordination needed? Yes   Has discharge transport been arranged? No   Financial Resource Strain   How hard is it for you to pay for the very basics like food, housing, medical care, and heating? Not hard   Housing Stability   In the last 12 months, was there a time when you were not able to pay the mortgage or rent on time? N   In the past 12 months, how many times have you moved where you were living? 2   At any time in the past 12 months, were you homeless or living in a shelter (including now)? N   Transportation Needs   In the past 12 months, has lack of transportation kept you from medical appointments or from getting medications? no   In the past 12 months, has lack of transportation kept you from meetings, work, or from getting things needed for daily living? No   Patient Choice   Provider Choice list and CMS website  (https://medicare.gov/care-compare#search) for post-acute Quality and Resource Measure Data were provided and reviewed with: Family;Patient   Patient / Family choosing to utilize agency / facility established prior to hospitalization Yes

## 2024-07-14 NOTE — PROGRESS NOTES
"Delmar Casas is a 73 y.o. male on day 1 of admission presenting with Respiratory failure with hypoxia, unspecified chronicity (Multi).    Subjective   This morning, pt is A&O x0. On 6L NC. Seems to follow command of doing a thumbs up.   Had a formed stool overnight no blood noted  This morning contacted son Bird who said pt was in the hospital in [place] for \"sepsis\" after getting chemo and had a UTI, uncertain if UTI caused the sepsis. He was A&O x3 before going to the hospital and had decreased talking ever since.   COVID positive first ~12 days ago and uncertain if he was treated at the SNF. He had decreased PO intake since getting COVID and son thinks pt's throat hurts.   He has had a Pickett since hospital admission in [place]. Did not use a Pickett before that.   Son will come in in the morning. Son's wife noted to work at  [employer] as a nursing aide.          Objective     Physical Exam  Vitals reviewed.   Constitutional:       Comments: Awake, does not respond to verbal questions. Appears to follow command of doing a R handed thumbs up   On NC    HENT:      Head: Normocephalic and atraumatic.   Eyes:      Extraocular Movements: Extraocular movements intact.      Conjunctiva/sclera: Conjunctivae normal.   Cardiovascular:      Rate and Rhythm: Normal rate and regular rhythm.      Heart sounds: No murmur heard.     No friction rub. No gallop.   Pulmonary:      Effort: Pulmonary effort is normal.      Comments: Scattered wheezes   Abdominal:      General: Bowel sounds are normal.      Palpations: Abdomen is soft. There is no mass.      Tenderness: There is no abdominal tenderness. There is no guarding or rebound.   Musculoskeletal:         General: No tenderness.      Cervical back: Neck supple.      Right lower leg: No edema.      Left lower leg: No edema.   Skin:     General: Skin is warm and dry.      Findings: No bruising or rash.   Neurological:      Mental Status: He is disoriented.      Comments: A&O x0   " "        Last Recorded Vitals  Blood pressure (!) 93/47, pulse 68, temperature 36.5 °C (97.7 °F), temperature source Temporal, resp. rate 19, height 1.778 m (5' 10\"), weight 64.3 kg (141 lb 12.1 oz), SpO2 96%.  Intake/Output last 3 Shifts:  I/O last 3 completed shifts:  In: 1102.5 (17.1 mL/kg) [I.V.:802.5 (12.5 mL/kg); IV Piggyback:300]  Out: 570 (8.9 mL/kg) [Urine:570 (0.2 mL/kg/hr)]  Weight: 64.3 kg     Relevant Results              This patient has a central line   Reason for the central line remaining today? Parenteral medication    This patient has a urinary catheter   Reason for the urinary catheter remaining today? critically ill patient who need accurate urinary output measurements           Assessment/Plan   Principal Problem:    Respiratory failure with hypoxia, unspecified chronicity (Multi)  Active Problems:    Hypernatremia    Lung cancer (Multi)    SANGEETHA Casas is a 73 y.o. male with past medical history of malignant neoplasm of unspecified  part of right bronchus, s/p CT and immunotherapy(1 month ago), sepsis, recurrent UTI, CKD stage IV, oropharyngeal dysphagia, asthma (no home oxygen) , BPH, hematuria, hypokalemia, CAD, anemia, neutropenia, HLD, early dementia, mood disturbance, anxiety admitted with AHRF 2/2 PNA, COVID positive, Severe hypernatremia and LUPILLO on CKD.     Neurology  # Acute metabolic encephalopathy due to hypernatremia and PNA  # Early dementia  # Anxiety, mood Disturbance  -Hold home hydroxyzine 5 mg and Ativan 0.5 mg as needed anxiety for now.  -Check for ICU delirium.  -Fall precaution. on soft restraints for agitation. Treat hypernatremia and pna      Cardiovascular  # CAD, HLD  # Prolonged Qtc, resolved   -On aspirin and rosuvastatin 10 mg. Hold while npo   -repeat EKG Qtc wnl      Pulmonary  # AHRF 2/2 PNA  # HAP  # COVID positive  # Hx of lung cancer  # Hx of asthma/COPD   -Patient presented with acute hypoxic respiratory failure requiring 15 L oxygen initiated, later " placed on Airvo, currently on Airvo 50 L / 60%  -hx 120 pack year tobacco use   -Labs showed leukocytosis, AB.35/45/65/24.8  -CT chest bronchopneumonia, Right upper lobe bronchiectasis and scarred consolidation are noted which can be seen in the setting of prior right upper lobe radiation treatment.  -MRSA came positive, high D-dimer 1,808, fibrinogen 510,   -Vascular US lower extremity came negative for any DVT     Plan  -iv vancomycin, Zosyn, azithromycin  -On COVID isolation protocol  -Ordered CT angio chest to exclude PE, pending  -Ordered COVID markers  -Remdesivir and Decadron   -On Spiriva Respimat daily and albuterol every 6 as needed  -ICS, Acapella, chest physiotherapy  -Aspiration precaution  -N.p.o. for now for possible aspiration PNA precaution. SLP.   - Pneumonia workup  & Blood culture pending.      Gastrointestinal  # Oropharyngeal dysphagia  # Decreased p.o. intake   # Sore throat s/p CT and immunotherapy.  # Liver nodule(possible cirrhosis/liver disease)  - Phos normal, monitor for refeeding syndrome.  - NPO for now. SLP eval   -Aspiration precaution  -On bowel regimen for constipation.   - monitor liver nodule.      Renal/Urology  # Severe hypernatremia  # Hyperchloremia  - Pt presented with altered mental status, labs showed sodium 165,   - 2/2 dehydration and decreased po intake  -Had decreased p.o. intake since his chemo almost a month ago due to sore throat  -Had a long drive from [place] to [place] for 19 hrs the day before admission with very minimal p.o. intake on the way     Plan  -On 5% DA @ 150 mL/h since ~9 PM , then switched to 75/hr ~9 am .   -Urine osmole and urine electrolytes ordered  -Nephrology (Dr Del Castillo)consulted, recommended D5      # LUPILLO on CKD(Stage 4)  # Hx of recurrent UTI   # Has chronic giron  - decreased PO intake 2/2 sore throat  - Cr in this admission 3.63, baseline not clear as no previous records available.   - Previously being treated for recurrent UTI  -  UA significant for high LE and pyuria.  - UCx pending  -Nephrology on board, urine electrolytes and osmolality ordered, pending  -On IV fluid, will monitor  -has Giron since 2 months ago hospital stay in [place]. No Giron before then. Keep for now / exchange Giron, when pt more awake attempt voiding trial      # Left adrenal gland nodule  -CT chest showed left adrenal nodule measuring 2.7 centimeters  -Radiology recommended comparison-outside imaging available, otherwise characterization with adrenal protocol CT.      # BPH  - hold Tamsulosin. Npo and has giron, exchanged 7/14     Hematology/Oncology  # Malignant neoplasm of unspecified  part of right bronchus  # S/P CT and Immunotherapy  # History of basal cell carcinoma on the nose  # Hx Anemia  -Had recent chemotherapy and immunotherapy, a month ago  -Developed sepsis followed by hospitalization and SNF after CT  -High D-dimer 1,808 , PT/INR normal.   -Ultrasound lower extremity venous excluded DVT  -CTA chest to exclude PE : no PE   -hold po iron   -get prior records to review cancer hx: physical release form signed by Son 7/14, [facility]       Infectious Disease  # Sepsis, PNA vs UTI   - SIRS score 3, lactate normal 1.9.  - LR 1 L bolus, vancomycin and Zosyn in the ED.  - on D5 @150 mls/h  - follow urine and blood cultures      # PNA  # COVID positive  - Please see respiratory above     Endocrine  # Mild hyperglycemia   - 2/2 5% dextrose  - No history of DM  -On n.p.o. sliding scale     Musculoskeletal/Skin  # Left hand wound  -Wound care consulted     IVF: 5% DA@150 MLS per hour.   Electrolytes: Replete as needed   Nutrition: NPO  GI ppx: None  VTE prophylaxis: Heparin  Antibiotics: vancomycin, Zosyn, Azithromycin,   Lines/tubes: Left chest Mediport, left sided midline (from prior to admission), PIV, Giron changed (7/14-)   Code: Full Code      Emergency contact: Trell(son): [phone], Bird (son): [phone]  PCP : Patricia Bryant      Disposition: 73 y.o.male admitted for AMS, acute respiratory failure, currently been managed for AHRF 2/2 PNA, COVID-positive, severe hypernatremia and LUPILLO , needs continuous ICU monitoring              Yamilet Blankenship DO

## 2024-07-14 NOTE — ED PROCEDURE NOTE
Procedure  Critical Care    Performed by: Andrew Naranjo DO  Authorized by: Andrew Naranjo DO    Critical care provider statement:     Critical care time (minutes):  31    Critical care time was exclusive of:  Separately billable procedures and treating other patients and teaching time    Critical care was necessary to treat or prevent imminent or life-threatening deterioration of the following conditions:  Respiratory failure and sepsis    Critical care was time spent personally by me on the following activities:  Ordering and performing treatments and interventions, ordering and review of laboratory studies, ordering and review of radiographic studies, pulse oximetry, re-evaluation of patient's condition, review of old charts, examination of patient, evaluation of patient's response to treatment, development of treatment plan with patient or surrogate and obtaining history from patient or surrogate    Care discussed with: admitting provider                 Andrew Naranjo DO  07/14/24 8389

## 2024-07-14 NOTE — CARE PLAN
The clinical goals for the shift include pt will tolerate weaning of oxygen and will remain hemodynamically stable.     Problem: Safety - Adult  Goal: Free from fall injury  Outcome: Progressing

## 2024-07-14 NOTE — PROGRESS NOTES
Physical Therapy                 Therapy Communication Note    Patient Name: Delmar Casas  MRN: [id number]  Today's Date: 7/14/2024     Discipline: Physical Therapy    Missed Visit Reason: Missed Visit Reason: Other (Comment) (pt status discussed with Nsg; pt demonstrates intermittent purposeful command-following, currently restrained at this time. Recommending co-eval to maximize pt tolerance and safety. No evaluation completed at this time.)    Missed Time: Attempt 1416    Comment:

## 2024-07-14 NOTE — ED PROVIDER NOTES
"HPI   Chief Complaint   Patient presents with    Respiratory Distress     Per ems \" called for a rapid decline in pt status\" pt presents with a room air spo2 of 66 squad placed pt on a non rebreather at 15lpm.  Pt was just brought to [facility] from [place] via family, and has been ill        History of present illness:  73-year-old male presents emergency room for complaints of respiratory failure.  The patient was brought by EMS at this time provides primary history stating the patient apparently is currently being treated for lung cancer.  They state that the patient has been residing in [place] and receiving care in [place] for lung cancer.  He recently received chemotherapy about 3 weeks ago as well as radiation therapy.  The family was apparently unhappy with the care that he was receiving at the nursing home decided to move the patient here as of yesterday and apparently checked the patient the nursing home in [place] and drove him from [place] to here.  They called EMS today when the patient began deteriorating and began having altered mental status.  The patient was unable to provide any history at this time due to his altered mental status.        Social history: Unable to obtain at this time secondary to current mental state    Review of systems: Unable to obtain at this time secondary to current mental state       physical exam:  General: Vitals noted  EENT: No lymphadenopathy, non rebreather in place  Cardiac: Regular, rate, rhythm, no murmur.   Pulmonary: Rhonchi is heard throughout, lung sounds are present throughout is warm  Abdomen: Soft, nonsurgical. Nontender. No peritoneal signs. Normoactive bowel sounds.   Extremities: No peripheral edema.   Skin: No rash.   Neuro: The patient appears to be altered this time is very lethargic and does not respond to the practitioner's voice, he does withdraw from pain at this time and is moving all 4 extremities      Medical decision making:   Testing: " CBC shows leukocytosis, CMP shows hyponatremia, troponin is elevated 36, CT scan shows no acute findings as interpreted radiology, CT scan chest without contrast shows concerns for pneumonia as well as lung cancer, COVID testing is positive      EKG taken at 1820 on July 13, 2024 shows sinus tachycardia 113, no STEMI no T wave  inversion      Plan:  73-year-old male presents emergency room for complaints of respiratory failure.  The patient was brought by EMS at this time provides primary history stating the patient apparently is currently being treated for lung cancer.  They state that the patient has been residing in [place] and receiving care in [place] for lung cancer.  He recently received chemotherapy about 3 weeks ago as well as radiation therapy.  The family was apparently unhappy with the care that he was receiving at the nursing home decided to move the patient here as of yesterday and apparently checked the patient the nursing home in [place] and drove him from [place] to here.  They called EMS today when the patient began deteriorating and began having altered mental status.  The patient was unable to provide any history at this time due to his altered mental status.  On physical exam the patient has rhonchi throughout and is obvious respiratory distress, the patient's pulse ox is 100% on nonrebreather 15 L and I instructed respiratory to switch him to Airvo at this time.  ABG was obtained as well which showed concerns for respiratory failure.  CT scan showed concerns for pneumonia and lung cancer.  Patient was started on IV antibiotics vancomycin and Zosyn at this time and sepsis alert was called as well.  Repeat exam the patient is on Airvo at this time and is breathing easier and is still satting appropriately.  Vitals are appropriate this time.  I explained to the family at bedside the need for the patient to be admitted for further care and treatment this time and they are agreeable to this plan.  I  "explained to them that the patient also tested positive for COVID at this time and I spoke with the hospital staff who accepted the patient at this time for further care and treatment.  Impression:   1.  COVID-19  2.  Pneumonia  3.  Respiratory failure          History provided by:  Patient   used: No                        Tarun Coma Scale Score: 9                     Patient History   History reviewed. No pertinent past medical history.  History reviewed. No pertinent surgical history.  No family history on file.  Social History     Tobacco Use    Smoking status: Unknown    Smokeless tobacco: Not on file   Substance Use Topics    Alcohol use: Not on file    Drug use: Not on file       Physical Exam   ED Triage Vitals   Temp Heart Rate Resp BP   07/13/24 1830 07/13/24 1830 07/13/24 1830 07/13/24 1830   36.8 °C (98.2 °F) (!) 114 20 (!) 148/100      SpO2 Temp Source Heart Rate Source Patient Position   07/13/24 1830 07/13/24 1830 -- 07/13/24 1830   97 % Oral  Lying      BP Location FiO2 (%)     07/13/24 1830 07/13/24 1826     Right arm 80 %       Physical Exam    ED Course & MDM   ED Course as of 07/13/24 2045   Sat Jul 13, 202413, 2024 1813 This is a 73-year-old male with past medical history of EMS reports throat cancer who had just been transported from [place] to [facility] to be I believe closer to family coming in with short of breath.  Not normally on oxygen was placed on nonrebreather.  Patient has history of dementia limiting examination at baseline and limiting examination due to the acute respiratory distress he arrived here with.  Bilaterally has rhonchi with tachypnea.  Will try breathing treatments and placed him on BiPAP.  Sepsis alert was called. [WL]   1822 Per ems \" called for a rapid decline in pt status\" pt presents with a room air spo2 of 66 squad placed pt on a non rebreather at 15lpm. Pt was just brought to [facility] from [place] via [[place]]   1859 Found to be " hypernatremic.  Will change him to LR. [WL]   1926 Ct shows Tree-in-bud opacities throughout the lungs is suspicious for  bronchopneumonia.      Extensive coronary atherosclerosis is present but no evidence of  cardiomegaly, pulmonary edema, or pleural effusions.      Right upper lobe bronchiectasis and scarred consolidation are noted  which can be seen in the setting of prior right upper lobe radiation  treatment. If patient has prior lung malignancy treated with  radiation, then recommend comparing with prior oncologic exams to  ensure stability. If patient does not have prior radiation treatment,  then recommend pulmonology consultation and further characterization  with PET-CT.      Left adrenal gland nodule measuring 2.7 cm is indeterminate. If  outside imaging is available, recommend comparison. Otherwise,  recommend characterization with adrenal protocol CT.      Subtle nodular contour of the liver parenchyma raises suspicion for  underlying liver disease or early cirrhosis.           [WL]   1940 Spoke with family at bedside.  States that his dementia is that recently has been almost not talking due to his throat and the lung cancer but is just confused and mild dementia.  States that he would like him to be full code. [WL]   2002 On reevaluation able to transition him to Airvo.  Is COVID-positive but has been COVID-positive family states recovered since the first of this month. [WL]      ED Course User Index  [GA] Braden Morataya MD  [WL] Andrew Naranjo,          Diagnoses as of 07/13/24 2045   Respiratory failure with hypoxia, unspecified chronicity (Multi)   Sepsis, due to unspecified organism, unspecified whether acute organ dysfunction present (Multi)   Hypernatremia       Medical Decision Making      Procedure  Procedures     Ayush Shelby PA-C  07/13/24 2102       Ayush Shelby PA-C  07/14/24 0008

## 2024-07-14 NOTE — PROGRESS NOTES
Vancomycin Dosing by Pharmacy- FOLLOW UP    Delmar Casas is a 73 y.o. year old male who Pharmacy has been consulted for vancomycin dosing for other respiratory failure and fever . Based on the patient's indication and renal status this patient is being dosed based on a goal trough/random level of 15-20. (LUPILLO)    Renal function is currently improving.(LUPILLO)    Current vancomycin dose: 1500 mg given every once @ 1900    Most recent trough level: 13 mcg/mL    Visit Vitals  BP 94/60   Pulse 67   Temp 37 °C (98.6 °F) (Temporal)   Resp 20        Lab Results   Component Value Date    CREATININE 2.92 (H) 2024    CREATININE 3.14 (H) 2024    CREATININE 3.41 (H) 2024    CREATININE 3.64 (H) 2024        Patient weight is as follows:   Vitals:    24 0800   Weight: 64.3 kg (141 lb 12.1 oz)       Cultures:  No results found for the encounter in last 14 days.       I/O last 3 completed shifts:  In: 1102.5 (17.1 mL/kg) [I.V.:802.5 (12.5 mL/kg); IV Piggyback:300]  Out: 570 (8.9 mL/kg) [Urine:570 (0.2 mL/kg/hr)]  Weight: 64.3 kg   I/O during current shift:  I/O this shift:  In: 413.8 [I.V.:413.8]  Out: 500 [Urine:500]    Temp (24hrs), Av.6 °C (97.9 °F), Min:36 °C (96.8 °F), Max:37 °C (98.6 °F)      Assessment/Plan    Below goal random/trough level. Orders placed for new vancomycin regimen of 500mg every once hours to begin at 1900 .    This dosing regimen is predicted by [employer] to result in the following pharmacokinetic parameters:      The next level will be obtained on 7/15 at 1900. May be obtained sooner if clinically indicated.   Will continue to monitor renal function daily while on vancomycin and order serum creatinine at least every 48 hours if not already ordered.  Follow for continued vancomycin needs, clinical response, and signs/symptoms of toxicity.       Amarilis Perea, PharmD

## 2024-07-14 NOTE — PROGRESS NOTES
Pharmacy Medication History Review    Delmar Casas is a 73 y.o. male admitted for Respiratory failure with hypoxia, unspecified chronicity (Multi). Pharmacy reviewed the patient's [id number] medications and allergies for accuracy.    The list below reflects the updated PTA list. Comments regarding how patient may be taking medications differently can be found in the Admit Orders Activity  Prior to Admission Medications   Prescriptions Last Dose Informant Patient Reported? Taking?   LORazepam (Ativan) 0.5 mg tablet Unknown Other Yes Yes   Sig: Take 1 tablet (0.5 mg) by mouth every 12 hours if needed for anxiety.   acetaminophen (Tylenol) 325 mg tablet Unknown Other Yes Yes   Sig: Take 2 tablets (650 mg) by mouth every 6 hours if needed for mild pain (1 - 3) or fever (temp greater than 38.0 C).   aspirin 81 mg EC tablet Unknown Other Yes Yes   Sig: Take 1 tablet (81 mg) by mouth once daily.   bisacodyl (Dulcolax, bisacodyl,) 10 mg suppository Unknown Other Yes Yes   Sig: Insert 1 suppository (10 mg) into the rectum once daily as needed for constipation.   cefTRIAXone (Rocephin) 1 gram Unknown Other Yes Yes   Sig: Infuse 1 g into a venous catheter once daily.   ferrous fumarate 324 mg (106 mg iron) tablet Unknown Other Yes Yes   Sig: Take 1 tablet by mouth once daily.   hydrOXYzine HCL (Atarax) 25 mg tablet Unknown Other Yes Yes   Sig: Take 1 tablet (25 mg) by mouth every 4 hours if needed for anxiety.   magnesium hydroxide (Milk of Magnesia) 400 mg/5 mL suspension Unknown Other Yes Yes   Sig: Take 30 mL by mouth once daily as needed for constipation.   mirtazapine (Remeron) 7.5 mg tablet Unknown Other Yes Yes   Sig: Take 1 tablet (7.5 mg) by mouth once daily at bedtime.   potassium chloride CR 20 mEq ER tablet Unknown Other Yes Yes   Sig: Take 1 tablet (20 mEq) by mouth 2 times a day. Do not crush or chew.   rosuvastatin (Crestor) 10 mg tablet Unknown Other Yes Yes   Sig: Take 1 tablet (10 mg) by mouth once  daily.   sodium phosphates (Fleet Enema) 19-7 gram/118 mL enema enema Unknown Other Yes Yes   Sig: Insert 133 mL (1 enema) into the rectum once daily.   tamsulosin (Flomax) 0.4 mg 24 hr capsule Unknown Other Yes Yes   Sig: Take 1 capsule (0.4 mg) by mouth once daily at bedtime.      Facility-Administered Medications: None        The list below reflects the updated allergy list. Please review each documented allergy for additional clarification and justification.  Allergies  Reviewed by Rocael Tma, EMT on 7/13/2024   No Known Allergies         Patient was unable to be assessed for M2B at discharge. Pharmacy has been updated to N/A - patient is from facility.    Sources used to complete the med history include   [facility] Order Summary Report printed at [facility] 7/13/24 17:29:13 ET, emailed to [facility] listserve from [facility]    Medications ADDED:  All medications above added to chart  Medications CHANGED:  None  Medications REMOVED:   None    Below are additional concerns with the patient's PTA list.  Ceftriaxone therapy for 3 days 7/12-7/15  Ativan therapy for 14 days 7/12-7/26    Megan Mariano, PharmD   Transitions of Care Pharmacist  [facility]  Please reach out via Secure Chat for questions, or if no response call [employer] or Anita Margarita

## 2024-07-14 NOTE — CONSULTS
"Inpatient consult to Nephrology  Consult performed by: Ad Del Castillo MD  Consult ordered by: Micheal Faith MD  Reason for consult: LUPILLO and hypernatremia  Assessment/Recommendations: LUPILLO unclear baseline.  Severity unclear  CKD4 reportedly  Respiratory failure  Hypernatremia severe life-threatening improving    -Continue D5W  -Discussed with primary team overnight    This patient is at high risk of decompensation due to severity of hypernatremia and potential for neurological complications with rapid correction    SUBJECTIVE  Pt interviewed. Chart reviewed  Interval events - Asked to see for LUPILLO and hypernatremia.  I was contacted in the middle of the night last night for advice given the severity of his hypernatremia on presentation with a serum sodium of 165.  He was started on D5W at 150/h.  Serum sodium is improved this morning as his LUPILLO.  History and physical reviewed.  Apparently he is from [place] and has had a clinical decline there over the past several weeks.  Family recently drove him from [place] to a local SNF.  There he was noted to be in distress and so he presented to the emergency room.  Noted to have severe hypernatremia up to 165 as noted above.  Also with respiratory failure.  He is currently encephalopathic and not able to provide any details regarding his medical history or his hospital course.  He has been COVID of positive for over the past month.  Currently getting remdesivir.  Does have a history of lung cancer unclear status of that      OBJECTIVE  BP (!) 93/47   Pulse 68   Temp 36.5 °C (97.7 °F) (Temporal)   Resp 19   Ht 1.778 m (5' 10\")   Wt 64.3 kg (141 lb 12.1 oz)   SpO2 96%   BMI 20.34 kg/m²    GEN -awake but not answering questions or following commands  RESP -no distress on nasal cannula  EXT -no edema      Sodium       Date/Time                Value               Ref Range           Status                07/14/2024 05:14 AM      153 (H)             136 - 145 mmol*     " Final                 07/14/2024 03:39 AM      152 (H)             136 - 145 mmol*     Final                 07/13/2024 11:26 PM      157 (H)             136 - 145 mmol*     Final            ----------  Chloride       Date/Time                Value               Ref Range           Status                07/14/2024 05:14 AM      119 (H)             98 - 107 mmol/L     Final                 07/14/2024 03:39 AM      119 (H)             98 - 107 mmol/L     Final                 07/13/2024 11:26 PM      124 (H)             98 - 107 mmol/L     Final            ----------  Urea Nitrogen       Date/Time                Value               Ref Range           Status                07/14/2024 05:14 AM      83 (H)              6 - 23 mg/dL        Final                 07/14/2024 03:39 AM      87 (H)              6 - 23 mg/dL        Final                 07/13/2024 11:26 PM      93 (HH)             6 - 23 mg/dL        Final              Comment:    Previous result verified on 7/13/2024 1856 on specimen/case [id number] called with component UREA for procedure Comprehensive Metabolic Panel with value 96 mg/dL.  ----------  Creatinine       Date/Time                Value               Ref Range           Status                07/14/2024 05:14 AM      3.14 (H)            0.50 - 1.30 mg*     Final                 07/14/2024 03:39 AM      3.41 (H)            0.50 - 1.30 mg*     Final                 07/13/2024 11:26 PM      3.64 (H)            0.50 - 1.30 mg*     Final            ----------  Potassium       Date/Time                Value               Ref Range           Status                07/14/2024 05:14 AM      4.1                 3.5 - 5.3 mmol*     Final                 07/14/2024 03:39 AM      4.0                 3.5 - 5.3 mmol*     Final                 07/13/2024 11:26 PM      4.1                 3.5 - 5.3 mmol*     Final            ----------  Bicarbonate       Date/Time                Value               Ref Range            Status                07/14/2024 05:14 AM      23                  21 - 32 mmol/L      Final                 07/14/2024 03:39 AM      23                  21 - 32 mmol/L      Final                 07/13/2024 11:26 PM      24                  21 - 32 mmol/L      Final            ----------     Scheduled medications  Scheduled medications:  [Held by provider] aspirin, 81 mg, oral, Daily  azithromycin, 500 mg, intravenous, q24h  dexAMETHasone, 6 mg, intravenous, q24h  heparin (porcine), 5,000 Units, subcutaneous, q8h  insulin lispro, 0-5 Units, subcutaneous, q6h  mupirocin, , Topical, BID  oxygen, , inhalation, Continuous - Inhalation  piperacillin-tazobactam, 2.25 g, intravenous, q6h  [START ON 7/15/2024] remdesivir, 100 mg, intravenous, q24h  [Held by provider] rosuvastatin, 10 mg, oral, Daily  [Held by provider] senna, 10 mL, oral, BID  [Held by provider] tamsulosin, 0.4 mg, oral, Nightly  tiotropium, 2 puff, inhalation, Daily      Continuous medications  Continuous Medications:  dextrose 5%, 75 mL/hr, Last Rate: 75 mL/hr (07/14/24 0940)      PRN medications  PRN medications: acetaminophen **OR** acetaminophen **OR** acetaminophen, albuterol, bisacodyl, dextrose, glucagon, vancomycin

## 2024-07-14 NOTE — H&P
Critical Care-History and Physical Note   Patient: Delmar Casas  Room/bed:  11/11-A  Admitted on: 7/13/2024    Age: 73 y.o.   Gender:male   Code Status:  Full Code   Admitting Dx: Hypernatremia [E87.0]  Respiratory failure with hypoxia, unspecified chronicity (Multi) [J96.91]  Sepsis, due to unspecified organism, unspecified whether acute organ dysfunction present (Multi) [A41.9]    MRN: [id number]  PCP: Patricia Carmona MD     Attending: [unfilled] Nurse: No care team member to display  TCC: No care team member to display        HPI    HPI: Delmar Casas is a 73 y.o. male with past medical history of malignant neoplasm of unspecified  part of right bronchus, s/p CT and immunotherapy(1 month ago), sepsis, recurrent UTI, CKD stage IV, oropharyngeal dysphagia, asthma (no home oxygen) , BPH, hematuria, hypokalemia, CAD, anemia, neutropenia, HLD, early dementia, mood disturbance, anxiety who presented to the ED for shortness of breath, gurgling in the throat and altered mental status.  Patient was still in altered so most of the information collected from family members (sons).  According to family patient was living in [place] for the last 15 years.  He was diagnosed with lung cancer 1 year ago. He was recently started chemotherapy & immunotherapy a month ago, next night of having CT, he became unresponsive at home with bloody urine, extreme weakness, 911 was called,  he was taken to the ER diagnosed with sepsis, stayed in the hospital for 6 days then transferred to SNF.  In the SNF he was not eating and drinking much, due to sore throat, he was also having recurrent UTI during hospitalization as well as in the SNF.  SNF called his family regarding his gradual worsening health condition, family drove to [place] and brought him back to [place] and admitted in [facility].  Driving was 19 hours, he did not eat anything but drink a little (Liquid IV) mixture on the way, but sleepy most of the time, responding  "only by opening eyes.  After coming in [place] he was admitted in [facility]. On  evening, he had about 5 PM, family members noticed he was making gurgling sound in the throat, coughing badly but nothing was coming up, was minimally responding, family called 911, squad found him saturating 66% and placed on a nonrebreather at 15 L/min, she was tearful in the ED. patient also had a history of lung cancer in different spot 15 years ago but family could not mention clearly regarding that.  He was also treated for possible basal cell carcinoma in right dorsal aspect of the nose with a nonhealing opening there.    Patient was a former smoker, smoked 2 pack/day for 60 years, former drinker, no history of drug use.  Family history positive for DM in mother, father  of lung cancer.   Patient was taking his health care by [facility] in [place].      Chief Complaint   Patient presents with    Respiratory Distress     Per ems \" called for a rapid decline in pt status\" pt presents with a room air spo2 of 66 squad placed pt on a non rebreather at 15lpm.  Pt was just brought to [facility] from [place] via family, and has been ill        ED course:     Vitals: T 98.2, /100, , RR 20 , Spo2  97% on on Airvo.    Labs:   - CBC: Leukocytosis 12.6,   - CMP: Sodium 164, chloride 124, glucose 96, BUN/creatinine 84/3.63, , troponin 36, lactate normal,   -AB.3 5/45/65/24.8      Imaging:   -CT head excluded any acute intracranial other than chronic  microvascular ischemia, sequela of prior left occipital lobe infarct and involutional changes.  - CT chest : Bronchopneumonia but no evidence of cardiomegaly, pulmonary edema, or pleural effusions.Right upper lobe bronchiectasis and scarred consolidation are noted which can be seen in the setting of prior right upper lobe radiation treatment. If patient has prior lung malignancy treated with radiation, then recommend comparing with prior oncologic exams " to  ensure stability. Needs prior CT to compare or PET CT.   - EKG: taken at 1820 on July 13, 2024 shows sinus tachycardia 113, no STEMI no T wave inversion , Qtc 543,     Micro:   - Blood culture, UC pending  - UA significant for UTI (on chronic giron) pyuria.   - Covid positive, flu negative    Interventions: LR 1L, vancomycin, Zosyn, D5 # 100 mls/h, duoneb    ROS: 12 points review of system is negative except as stated in the HPI above.   ROS  Review of Systems   Constitutional:  Positive for appetite change, fatigue and unexpected weight change.   HENT:  Positive for sore throat and trouble swallowing.    Respiratory:  Positive for cough, choking and shortness of breath.    Skin:  Positive for wound (Dorsal aspect of the left).   Psychiatric/Behavioral:  Positive for confusion.         Past Medical History   History reviewed. No pertinent past medical history.     Surgical History   History reviewed. No pertinent surgical history.    Family History   No family history on file.    Social History     Social History     Socioeconomic History    Marital status:      Spouse name: Not on file    Number of children: Not on file    Years of education: Not on file    Highest education level: Not on file   Occupational History    Not on file   Tobacco Use    Smoking status: Unknown    Smokeless tobacco: Not on file   Substance and Sexual Activity    Alcohol use: Not on file    Drug use: Not on file    Sexual activity: Not on file   Other Topics Concern    Not on file   Social History Narrative    Not on file     Social Determinants of Health     Financial Resource Strain: Not on file   Food Insecurity: Not on file   Transportation Needs: Not on file   Physical Activity: Not on file   Stress: Not on file   Social Connections: Not on file   Intimate Partner Violence: Not on file   Housing Stability: Not on file       Tobacco Use: Not on file        Social History     Substance and Sexual Activity   Alcohol Use None  "       Allergies   No Known Allergies       Meds    Scheduled medications  [START ON 7/14/2024] aspirin, 81 mg, oral, Daily  [START ON 7/14/2024] azithromycin, 500 mg, intravenous, q24h  dexAMETHasone, 6 mg, intravenous, q24h  [START ON 7/14/2024] ferrous fumarate, 1 tablet, oral, Daily  heparin (porcine), 5,000 Units, subcutaneous, q8h  oxygen, , inhalation, Continuous - Inhalation  [START ON 7/14/2024] piperacillin-tazobactam, 2.25 g, intravenous, q6h  remdesivir, 200 mg, intravenous, Once  [START ON 7/14/2024] remdesivir, 100 mg, intravenous, q24h  [START ON 7/14/2024] rosuvastatin, 10 mg, oral, Daily  senna, 10 mL, oral, BID  [START ON 7/14/2024] tamsulosin, 0.4 mg, oral, Nightly  [START ON 7/14/2024] tiotropium, 2 puff, inhalation, Daily      Continuous medications  dextrose 5%, 150 mL/hr, Last Rate: 150 mL/hr (07/13/24 2300)      PRN medications  PRN medications: acetaminophen **OR** acetaminophen **OR** acetaminophen, albuterol, bisacodyl, vancomycin     Objective     Vitals  Visit Vitals  /77   Pulse 97   Temp 36 °C (96.8 °F) (Temporal)   Resp (!) 33   Ht 1.778 m (5' 10\")   Wt 64.3 kg (141 lb 12.1 oz)   SpO2 95%   BMI 20.34 kg/m²   Smoking Status Unknown   BSA 1.78 m²        Physical Examination:  Gen:  thin appearance, altered.   HEENT: AT/NC, no conjunctival pallor, anicteric sclera, EOMI(not tested), perforation on the dorsal aspect of rt nose due possible basal cell carcinoma resection,   Neck: supple, no LAD/JVD  Chest: Bilateral wheezing on both lung field, no crackles appreciated  CVS: s1 & S2 only, no MGR  Abd: soft, flat, NT/ND, BS+ equivocal  Ext: no cyanosis/clubbing or pedal edema, multiple bruising in bilateral forearms, wound on left dorsal hand,   Neuro: Aox1, responding to strong tactile stimulation only by opening eyes, rest of the neurological function was not tested as patient is altered.   I/Os    Intake/Output Summary (Last 24 hours) at 7/13/2024 0114  Last data filed at 7/13/2024 " "2045  Gross per 24 hour   Intake --   Output 290 ml   Net -290 ml       Vent Settings   FiO2 (%):  [80 %] 80 %     Labs:   Results from last 72 hours   Lab Units 07/13/24 1817 07/12/24  1250   SODIUM mmol/L 165* 164*   POTASSIUM mmol/L 4.3 4.4   CHLORIDE mmol/L 125* 124*   CO2 mmol/L 25 23   BUN mg/dL 96* 84*   CREATININE mg/dL 4.14* 3.63*   GLUCOSE mg/dL 132* 96   CALCIUM mg/dL 9.1 9.3   ANION GAP mmol/L 19 21*   EGFR mL/min/1.73m*2 14* 17*      Results from last 72 hours   Lab Units 07/13/24 1817 07/12/24  1250   WBC AUTO x10*3/uL 12.6* 12.0*   HEMOGLOBIN g/dL 12.7* 13.2*   HEMATOCRIT % 43.8 45.4   PLATELETS AUTO x10*3/uL 230 222   NEUTROS PCT AUTO % 68.1 75.2   LYMPHS PCT AUTO % 23.0 16.9   MONOS PCT AUTO % 7.2 6.1   EOS PCT AUTO % 0.9 0.8      Lab Results   Component Value Date    CALCIUM 9.1 07/13/2024      No results found for: \"CRP\"   [unfilled]     Micro/ID:   No results found for the last 90 days.                   No lab exists for component: \"AGALPCRNB\"   .ID  No results found for: \"URINECULTURE\", \"BLOODCULT\", \"CSFCULTSMEAR\"      Images    CT head wo IV contrast  Narrative: Interpreted By:  Segun Ford,   STUDY:  CT HEAD WO IV CONTRAST;  7/13/2024 8:22 pm      INDICATION:  Signs/Symptoms:AMS.      COMPARISON:  None.      ACCESSION NUMBER(S):  [id number]      ORDERING CLINICIAN:  RYAN CALVILLO      TECHNIQUE:  Noncontrast axial CT scan of head was performed.      FINDINGS:  Parenchyma: There is no intracranial hemorrhage. The grey-white  differentiation is intact. There is no mass effect or midline shift.  Encephalomalacia left occipital lobe from prior infarct. Superimposed  confluent supratentorial hypodensity, nonspecific, but likely  secondary to chronic microvascular ischemia.      CSF Spaces: Mild generalized volume loss with concordant  ventriculomegaly.      Extra-Axial Fluid: There is no extraaxial fluid collection.      Calvarium: The calvarium is unremarkable.      Paranasal " sinuses: Moderate to severe diffuse paranasal sinus mucosal  thickening.      Mastoids: Clear.      Orbits: Bilateral lens replacements      Soft tissues: Unremarkable.      Impression: No acute intracranial hemorrhage, mass effect, or CT apparent acute  infarct. Chronic microvascular ischemia, sequela of prior left  occipital lobe infarct and involutional changes.      Signed by: Segun Ford 7/13/2024 8:57 PM  Dictation workstation:   [id number]  CT chest wo IV contrast  Narrative: Interpreted By:  Cristiano Brown,   STUDY:  CT CHEST WO IV CONTRAST;  7/13/2024 6:39 pm      INDICATION:  Signs/Symptoms:Coarse breath sounds heard bilateral possible CHF  versus pneumonia.      COMPARISON:  None.      ACCESSION NUMBER(S):  [id number]      ORDERING CLINICIAN:  RYAN CALVILLO      TECHNIQUE:  Axial CT images of the chest obtained without contrast.      FINDINGS:  BONES: No acute osseous abnormality. Diffuse osseous  demineralization. Chronic healed rib fracture deformities. LOWER  NECK: Unremarkable. CHEST WALL: Left chest port with catheter tip in  the mid SVC. UPPER ABDOMEN: Left adrenal gland nodule measuring 2.5 x  2.7 cm with internal Hounsfield unit of 24, indeterminate atrophic  kidneys with vascular calcifications, likely chronic medical renal  disease. Mildly distended gallbladder but no surrounding  inflammation. Questionable nodular contour of the liver parenchyma  raising suspicion for underlying liver disease.      VESSELS: Calcification of the aortic arch and its branches. No  aneurysm. HEART: Normal size. Severe coronary atherosclerosis. There  is calcification of the mitral annulus, aortic annulus, and aortic  valve. Metallic density within the left ventricle, difficult to  characterize and localize in the absence of contrast. MEDIASTINUM AND  JENELLE: No pathologically enlarged lymph nodes. LUNGS AND LARGE  AIRWAYS: There is bronchiectasis, consolidation, and scarring in the  right upper lobe with a  linear border favoring prior right upper lobe  radiation. Scattered tree-in-bud opacities throughout the bilateral  lower lobes, left upper lobe, and lingula are present. PLEURA: No  pleural effusion or pneumothorax.      Impression: Tree-in-bud opacities throughout the lungs is suspicious for  bronchopneumonia.      Extensive coronary atherosclerosis is present but no evidence of  cardiomegaly, pulmonary edema, or pleural effusions.      Right upper lobe bronchiectasis and scarred consolidation are noted  which can be seen in the setting of prior right upper lobe radiation  treatment. If patient has prior lung malignancy treated with  radiation, then recommend comparing with prior oncologic exams to  ensure stability. If patient does not have prior radiation treatment,  then recommend pulmonology consultation and further characterization  with PET-CT.      Left adrenal gland nodule measuring 2.7 cm is indeterminate. If  outside imaging is available, recommend comparison. Otherwise,  recommend characterization with adrenal protocol CT.      Subtle nodular contour of the liver parenchyma raises suspicion for  underlying liver disease or early cirrhosis.          MACRO:  Critical Finding:  See findings. Notification was initiated on  7/13/2024 at 7:19 pm by  Cristiano Brown.  (**-F-**) Instructions:      Signed by: Cristiano Brown 7/13/2024 7:20 PM  Dictation workstation:   [id number]      Assessment and Plan    Problem list:   Principal Problem:    Respiratory failure with hypoxia, unspecified chronicity (Multi)      Delmar Casas is a 73 y.o. male with past medical history of malignant neoplasm of unspecified  part of right bronchus, s/p CT and immunotherapy(1 month ago), sepsis, recurrent UTI, CKD stage IV, oropharyngeal dysphagia, asthma (no home oxygen) , BPH, hematuria, hypokalemia, CAD, anemia, neutropenia, HLD, early dementia, mood disturbance, anxiety admitted with AHRF 2/2 PNA, COVID positive, Severe  hypernatremia and LUPILLO on CKD.       Neurology  # AMS  # Early dementia  # Anxiety  # Mood Disturbance  - 2/2 severe hypernatremia /metabolic encephalopathy/ AHRF  -Hold home hydroxyzine 5 mg and Ativan 0.5 mg as needed anxiety for now.  -Check for ICU delirium.  - fall precaution  - on soft restraints for agitation.       Cardiovascular  # CAD  # HLD  # Prolonged Qtc(543)  -On aspirin and rosuvastatin 10 mg  -Avoid QTc prolongation drugs.     Pulmonary  # AHRF 2/2 PNA  # HAP  # COVID positive  # Hx of lung cancer  # Hx of asthma  -Patient presented with acute hypoxic respiratory failure requiring 15 L oxygen initiated, later placed on Airvo, currently on Airvo 50 L / 60%  -Labs showed leukocytosis, AB.35/45/65/24.8  -CT chest bronchopneumonia, Right upper lobe bronchiectasis and scarred consolidation are noted which can be seen in the setting of prior right upper lobe radiation treatment.  -MRSA came positive, high D-dimer 1,808, fibrinogen 510,   -Vascular US lower extremity came negative for any DVT    Plan  -Started vancomycin, Zosyn, azithromycin  -On COVID isolation protocol  -Ordered CT angio chest to exclude PE, pending  -Ordered COVID markers  -started Remdesivir and Decadron  -On Spiriva Respimat daily and albuterol every 6 as needed  -ICS, Acapella, chest physiotherapy  -Aspiration precaution  -N.p.o. for now for possible aspiration PNA precaution  - Pneumonia workup  & Blood culture pending.       Gastrointestinal  # Oropharyngeal dysphagia  # Decreased p.o. intake   # Sore throat s/p CT and immunotherapy.  # Liver nodule(possible cirrhosis/liver disease)  - Phos normal, monitor for refeeding syndrome.  - NPO for now.   - needs SLP evaluation  - on D5 @ 150 mls/h  - SLP evaluation ordered.  -Aspiration precaution  -On bowel regimen for constipation.   - monitor liver nodule.     Renal/Urology  # Severe hypernatremia  # Hyperchloremia  - Pt presented with altered mental status, labs showed sodium 165,    - 2/2 dehydration and decreased po intake  -Had decreased p.o. intake since his chemo almost a month ago due to sore throat  -Had a long drive from [place] to [place] for 19 hrs the day before admission with very minimal p.o. intake on the way    Plan  -On 5% DA @ 150 mL/h  -Urine osmole and urine electrolytes ordered  -Nephrology (Dr Del Castillo)consulted, recommended D5     # LUPILLO on CKD(Stage 4)  # Hx of recurrent UTI   # Has chronic giron  - decreased PO intake 2/2 sore throat  - Cr in this admission 3.63, baseline not clear as no previous records available.   - Previously being treated for recurrent UTI  - UA significant for high LE and pyuria.  - UCx pending  -Nephrology on board, urine electrolytes and osmolality ordered, pending  -On IV fluid, will monitor    # Left adrenal gland nodule  -CT chest showed left adrenal nodule measuring 2.7 centimeters  -Radiology recommended comparison-outside imaging available, otherwise characterization with adrenal protocol CT.     # BPH  - On Tamsulosin      Hematology/Oncology  # Malignant neoplasm of unspecified  part of right bronchus  # S/P CT and Immunotherapy  # History of basal cell carcinoma on the nose  # Hx Anemia  -Had recent chemotherapy and immunotherapy, a month ago  -Developed sepsis followed by hospitalization and SNF after CT  -High D-dimer 1,808 , PT/INR normal.   -Ultrasound lower extremity venous excluded DVT  -CTA chest to exclude PE is pending.   -Need further workup and monitoring  -Patient can be benefited by heme-onc consultation.  -On ferrous sulfate, continue    Infectious Disease  # Sepsis  - SIRS score 3, lactate normal 1.9.  - LR 1 L bolus, vancomycin and Zosyn in the ED.  - on D5 @150 mls/h    # PNA  # COVID positive  - Please see respiratory above    Endocrine  # Mild hyperglycemia   - 2/2 5% dextrose  - No history of DM  -On n.p.o. sliding scale    Musculoskeletal/Skin  # Left hand wound  -Wound care consulted        IVF: 5% DA@150 MLS per hour.    Electrolytes: Replete as needed   Nutrition: NPO  GI ppx: None  VTE prophylaxis: Heparin  Antibiotics: vancomycin, Zosyn, Azithromycin,   Lines/tubes: Left chest Mediport, left sided midline, PIV, Pickett  Code: Full Code     Emergency contact: Trell(son): [phone], Bird (son): [phone]  PCP : Patricia Bryant    Disposition: 73 y.o.male admitted for AMS, acute respiratory failure, currently been managed for AHRF 2/2 PNA, COVID-positive, severe hypernatremia and LUPILLO , needs continuous ICU monitoring , anticipate >48hr inpatient LOS.    Leonardo Baltazar MD

## 2024-07-14 NOTE — PROGRESS NOTES
Vancomycin Dosing by Pharmacy- INITIAL    Delmar Casas is a 73 y.o. year old male who Pharmacy has been consulted for vancomycin dosing for pneumonia. Based on the patient's indication and renal status this patient will be dosed based on a goal trough/random level of 15-20.     Renal function is currently declining.    Visit Vitals  /77   Pulse 97   Temp 36 °C (96.8 °F) (Temporal)   Resp (!) 33        Lab Results   Component Value Date    CREATININE 4.14 (H) 2024    CREATININE 3.63 (H) 2024        Patient weight is as follows:   Vitals:    244   Weight: 64.3 kg (141 lb 12.1 oz)       Cultures:  No results found for the encounter in last 14 days.        No intake/output data recorded.  I/O during current shift:  I/O this shift:  In: -   Out: 290 [Urine:290]    Temp (24hrs), Av.4 °C (97.5 °F), Min:36 °C (96.8 °F), Max:36.8 °C (98.2 °F)         Assessment/Plan     Patient has already been given a loading dose of 1500 mg.  Will initiate vancomycin maintenance, dose by level.  Follow-up level will be ordered on  at 1800 unless clinically indicated sooner.  Will continue to monitor renal function daily while on vancomycin and order serum creatinine at least every 48 hours if not already ordered.  Follow for continued vancomycin needs, clinical response, and signs/symptoms of toxicity.       Apollo Faith, PharmD

## 2024-07-14 NOTE — SIGNIFICANT EVENT
Patient Delmar Casas, a 73-year-old male with significant past medical history of lung cancer, on chemotherapy and immunotherapy, admitted in the hospital for acute hypoxic respiratory failure 2/2 for pneumonia, COVID-positive, high D-dimer level of 1,808, on Airvo(50 L / 60%) and LUPILLO with a GFR of 17.   He is at high risk of DVT as well  PE, he had a CT chest without contrast which showed pneumonia and lung cancer.  For his LUPILLO and I initially ordered VQ scan, which is not available until 7 AM tomorrow morning.    I did talk with radiologist (Dr. Palacio) for the concern of PE and wanted to order a CT angio chest to exclude PE..  Regarding patient's clinical condition, acute hypoxic respiratory failure with high oxygen requirement, COVID-positive, history of cancer with recent chemotherapy, he approved for CT angio chest.

## 2024-07-14 NOTE — HOSPITAL COURSE
Delmar Casas is a 73 y.o. male with past medical history of malignant neoplasm of unspecified  part of right bronchus, s/p CT and immunotherapy(1 month ago), sepsis, recurrent UTI, CKD stage IV, oropharyngeal dysphagia, asthma (no home oxygen) , BPH, hematuria, hypokalemia, CAD, anemia, neutropenia, HLD, early dementia, mood disturbance, anxiety admitted with AHRF 2/2 PNA, COVID positive, severe hypernatremia and LUPILLO on CKD.    He was brought from [place] by family recently to [place] where they found him hypernatremic and with AMS. Before that he was being treated for “sepsis” at a [place] hospital and got IV abx in the last 2 months. He had evidence of PNA on imaging and was treated with azithro, vanc and Zosyn. Also MRSA and COVID positive, started on dexamethasone and remdesivir. Nephrology was consulted and recommended continuing D5W for the hypernatremia which improved throughout the course of admission. SLP was ordered but unable to be performed due to patient's mental status and an NGT was placed and tube feeds were started. Palliative care was consulted and a meeting was scheduled with [facility] for 7/23. His giron was removed on 7/18. General surgery was consulted for PEG tube placement. Patient was medically stable for transfer to regular nursing floor.     Pt ripped off NGT and is a poor candidate for PEG tube due to AMS. Hospice meeting with pt and family Tuesday.

## 2024-07-15 LAB
ALBUMIN SERPL BCP-MCNC: 2.3 G/DL (ref 3.4–5)
ALBUMIN SERPL BCP-MCNC: 2.4 G/DL (ref 3.4–5)
ANION GAP SERPL CALC-SCNC: 13 MMOL/L (ref 10–20)
ANION GAP SERPL CALC-SCNC: 15 MMOL/L (ref 10–20)
BUN SERPL-MCNC: 57 MG/DL (ref 6–23)
BUN SERPL-MCNC: 68 MG/DL (ref 6–23)
CALCIUM SERPL-MCNC: 7.8 MG/DL (ref 8.6–10.3)
CALCIUM SERPL-MCNC: 7.9 MG/DL (ref 8.6–10.3)
CHLORIDE SERPL-SCNC: 113 MMOL/L (ref 98–107)
CHLORIDE SERPL-SCNC: 114 MMOL/L (ref 98–107)
CO2 SERPL-SCNC: 23 MMOL/L (ref 21–32)
CO2 SERPL-SCNC: 24 MMOL/L (ref 21–32)
CREAT SERPL-MCNC: 1.95 MG/DL (ref 0.5–1.3)
CREAT SERPL-MCNC: 2.19 MG/DL (ref 0.5–1.3)
EGFRCR SERPLBLD CKD-EPI 2021: 31 ML/MIN/1.73M*2
EGFRCR SERPLBLD CKD-EPI 2021: 35 ML/MIN/1.73M*2
ERYTHROCYTE [DISTWIDTH] IN BLOOD BY AUTOMATED COUNT: 17.1 % (ref 11.5–14.5)
GLUCOSE BLD MANUAL STRIP-MCNC: 120 MG/DL (ref 74–99)
GLUCOSE BLD MANUAL STRIP-MCNC: 136 MG/DL (ref 74–99)
GLUCOSE SERPL-MCNC: 135 MG/DL (ref 74–99)
GLUCOSE SERPL-MCNC: 148 MG/DL (ref 74–99)
HCT VFR BLD AUTO: 30.2 % (ref 41–52)
HGB BLD-MCNC: 9 G/DL (ref 13.5–17.5)
MAGNESIUM SERPL-MCNC: 2.07 MG/DL (ref 1.6–2.4)
MCH RBC QN AUTO: 28.8 PG (ref 26–34)
MCHC RBC AUTO-ENTMCNC: 29.8 G/DL (ref 32–36)
MCV RBC AUTO: 97 FL (ref 80–100)
NRBC BLD-RTO: 0 /100 WBCS (ref 0–0)
PHOSPHATE SERPL-MCNC: 3.5 MG/DL (ref 2.5–4.9)
PHOSPHATE SERPL-MCNC: 3.6 MG/DL (ref 2.5–4.9)
PLATELET # BLD AUTO: 140 X10*3/UL (ref 150–450)
POTASSIUM SERPL-SCNC: 3.5 MMOL/L (ref 3.5–5.3)
POTASSIUM SERPL-SCNC: 3.6 MMOL/L (ref 3.5–5.3)
Q ONSET: 225 MS
QRS COUNT: 12 BEATS
QRS DURATION: 80 MS
QT INTERVAL: 378 MS
QTC CALCULATION(BAZETT): 405 MS
QTC FREDERICIA: 396 MS
R AXIS: 70 DEGREES
RBC # BLD AUTO: 3.12 X10*6/UL (ref 4.5–5.9)
SODIUM SERPL-SCNC: 146 MMOL/L (ref 136–145)
SODIUM SERPL-SCNC: 148 MMOL/L (ref 136–145)
STAPHYLOCOCCUS SPEC CULT: ABNORMAL
T AXIS: 90 DEGREES
T OFFSET: 414 MS
VANCOMYCIN SERPL-MCNC: 12.8 UG/ML (ref 5–20)
VENTRICULAR RATE: 69 BPM
WBC # BLD AUTO: 9.2 X10*3/UL (ref 4.4–11.3)

## 2024-07-15 PROCEDURE — 2500000004 HC RX 250 GENERAL PHARMACY W/ HCPCS (ALT 636 FOR OP/ED)

## 2024-07-15 PROCEDURE — 36591 DRAW BLOOD OFF VENOUS DEVICE: CPT

## 2024-07-15 PROCEDURE — 99233 SBSQ HOSP IP/OBS HIGH 50: CPT

## 2024-07-15 PROCEDURE — 80069 RENAL FUNCTION PANEL: CPT

## 2024-07-15 PROCEDURE — 82947 ASSAY GLUCOSE BLOOD QUANT: CPT

## 2024-07-15 PROCEDURE — 2500000001 HC RX 250 WO HCPCS SELF ADMINISTERED DRUGS (ALT 637 FOR MEDICARE OP)

## 2024-07-15 PROCEDURE — 85027 COMPLETE CBC AUTOMATED: CPT

## 2024-07-15 PROCEDURE — 1200000002 HC GENERAL ROOM WITH TELEMETRY DAILY

## 2024-07-15 PROCEDURE — 37799 UNLISTED PX VASCULAR SURGERY: CPT

## 2024-07-15 PROCEDURE — 94760 N-INVAS EAR/PLS OXIMETRY 1: CPT

## 2024-07-15 PROCEDURE — 99233 SBSQ HOSP IP/OBS HIGH 50: CPT | Performed by: INTERNAL MEDICINE

## 2024-07-15 PROCEDURE — 80202 ASSAY OF VANCOMYCIN: CPT

## 2024-07-15 PROCEDURE — 2500000005 HC RX 250 GENERAL PHARMACY W/O HCPCS: Performed by: INTERNAL MEDICINE

## 2024-07-15 PROCEDURE — 83735 ASSAY OF MAGNESIUM: CPT

## 2024-07-15 RX ORDER — MUPIROCIN 20 MG/G
OINTMENT TOPICAL 2 TIMES DAILY
Status: COMPLETED | OUTPATIENT
Start: 2024-07-15 | End: 2024-07-18

## 2024-07-15 RX ORDER — VANCOMYCIN HYDROCHLORIDE 500 MG/100ML
500 INJECTION, SOLUTION INTRAVENOUS ONCE
Status: COMPLETED | OUTPATIENT
Start: 2024-07-15 | End: 2024-07-15

## 2024-07-15 RX ORDER — POTASSIUM CHLORIDE 14.9 MG/ML
20 INJECTION INTRAVENOUS ONCE
Status: COMPLETED | OUTPATIENT
Start: 2024-07-15 | End: 2024-07-15

## 2024-07-15 RX ORDER — NYSTATIN 100000 [USP'U]/ML
4 SUSPENSION ORAL 3 TIMES DAILY
Status: DISCONTINUED | OUTPATIENT
Start: 2024-07-15 | End: 2024-07-23 | Stop reason: HOSPADM

## 2024-07-15 RX ADMIN — Medication 1 L/MIN: at 08:00

## 2024-07-15 RX ADMIN — HEPARIN SODIUM 5000 UNITS: 5000 INJECTION INTRAVENOUS; SUBCUTANEOUS at 23:57

## 2024-07-15 RX ADMIN — DEXAMETHASONE SODIUM PHOSPHATE 6 MG: 10 INJECTION INTRAMUSCULAR; INTRAVENOUS at 00:14

## 2024-07-15 RX ADMIN — NYSTATIN 4 ML: 100000 SUSPENSION ORAL at 08:58

## 2024-07-15 RX ADMIN — MUPIROCIN: 20 OINTMENT TOPICAL at 21:11

## 2024-07-15 RX ADMIN — REMDESIVIR 100 MG: 100 INJECTION, POWDER, LYOPHILIZED, FOR SOLUTION INTRAVENOUS at 02:28

## 2024-07-15 RX ADMIN — DEXTROSE MONOHYDRATE 100 ML/HR: 50 INJECTION, SOLUTION INTRAVENOUS at 18:39

## 2024-07-15 RX ADMIN — HEPARIN SODIUM 5000 UNITS: 5000 INJECTION INTRAVENOUS; SUBCUTANEOUS at 00:14

## 2024-07-15 RX ADMIN — PIPERACILLIN SODIUM AND TAZOBACTAM SODIUM 2.25 G: 2; .25 INJECTION, SOLUTION INTRAVENOUS at 02:30

## 2024-07-15 RX ADMIN — POTASSIUM CHLORIDE 20 MEQ: 14.9 INJECTION, SOLUTION INTRAVENOUS at 08:56

## 2024-07-15 RX ADMIN — HEPARIN SODIUM 5000 UNITS: 5000 INJECTION INTRAVENOUS; SUBCUTANEOUS at 08:59

## 2024-07-15 RX ADMIN — PIPERACILLIN SODIUM AND TAZOBACTAM SODIUM 2.25 G: 2; .25 INJECTION, SOLUTION INTRAVENOUS at 13:44

## 2024-07-15 RX ADMIN — DEXTROSE MONOHYDRATE 75 ML/HR: 50 INJECTION, SOLUTION INTRAVENOUS at 17:40

## 2024-07-15 RX ADMIN — HEPARIN SODIUM 5000 UNITS: 5000 INJECTION INTRAVENOUS; SUBCUTANEOUS at 17:39

## 2024-07-15 RX ADMIN — AZITHROMYCIN MONOHYDRATE 500 MG: 500 INJECTION, POWDER, LYOPHILIZED, FOR SOLUTION INTRAVENOUS at 00:14

## 2024-07-15 RX ADMIN — DEXAMETHASONE SODIUM PHOSPHATE 6 MG: 10 INJECTION INTRAMUSCULAR; INTRAVENOUS at 23:57

## 2024-07-15 RX ADMIN — AZITHROMYCIN MONOHYDRATE 500 MG: 500 INJECTION, POWDER, LYOPHILIZED, FOR SOLUTION INTRAVENOUS at 23:57

## 2024-07-15 RX ADMIN — VANCOMYCIN HYDROCHLORIDE 500 MG: 500 INJECTION, SOLUTION INTRAVENOUS at 21:10

## 2024-07-15 RX ADMIN — Medication 15 ML: at 08:57

## 2024-07-15 RX ADMIN — MUPIROCIN: 20 OINTMENT TOPICAL at 08:57

## 2024-07-15 RX ADMIN — Medication 1 L/MIN: at 20:00

## 2024-07-15 RX ADMIN — PIPERACILLIN SODIUM AND TAZOBACTAM SODIUM 2.25 G: 2; .25 INJECTION, SOLUTION INTRAVENOUS at 18:16

## 2024-07-15 RX ADMIN — DEXTROSE MONOHYDRATE 75 ML/HR: 50 INJECTION, SOLUTION INTRAVENOUS at 04:47

## 2024-07-15 RX ADMIN — NYSTATIN 4 ML: 100000 SUSPENSION ORAL at 14:35

## 2024-07-15 RX ADMIN — NYSTATIN 400000 UNITS: 100000 SUSPENSION ORAL at 21:10

## 2024-07-15 RX ADMIN — PIPERACILLIN SODIUM AND TAZOBACTAM SODIUM 2.25 G: 2; .25 INJECTION, SOLUTION INTRAVENOUS at 06:32

## 2024-07-15 ASSESSMENT — PAIN SCALES - PAIN ASSESSMENT IN ADVANCED DEMENTIA (PAINAD)
CONSOLABILITY: NO NEED TO CONSOLE
TOTALSCORE: 0
BODYLANGUAGE: RELAXED
TOTALSCORE: 0
TOTALSCORE: 0
BREATHING: NORMAL
BODYLANGUAGE: RELAXED
CONSOLABILITY: NO NEED TO CONSOLE
BREATHING: NORMAL
FACIALEXPRESSION: SMILING OR INEXPRESSIVE
CONSOLABILITY: NO NEED TO CONSOLE
BODYLANGUAGE: RELAXED
TOTALSCORE: MUSIC
BREATHING: NORMAL
FACIALEXPRESSION: SMILING OR INEXPRESSIVE
FACIALEXPRESSION: SMILING OR INEXPRESSIVE

## 2024-07-15 ASSESSMENT — PAIN SCALES - GENERAL
PAINLEVEL_OUTOF10: 0 - NO PAIN

## 2024-07-15 ASSESSMENT — PAIN - FUNCTIONAL ASSESSMENT
PAIN_FUNCTIONAL_ASSESSMENT: CPOT (CRITICAL CARE PAIN OBSERVATION TOOL)

## 2024-07-15 ASSESSMENT — PAIN SCALES - WONG BAKER
WONGBAKER_NUMERICALRESPONSE: NO HURT

## 2024-07-15 NOTE — CONSULTS
Wound Care Consult     Visit Date: 7/15/2024      Patient Name: Delmar Casas         MRN: [id number]           YOB: 1950     Reason for Consult:   Scattered wounds     Wound History:  POA     Pertinent Labs:   Albumin   Date Value Ref Range Status   07/15/2024 2.4 (L) 3.4 - 5.0 g/dL Final       Wound Assessment:  Wound 07/13/24 Skin Tear Hand Dorsal;Left (Active)   Wound Image   07/13/24 2223   Site Assessment Pink;Red 07/15/24 0745   Susan-Wound Assessment Pink 07/14/24 2011   Drainage Description Tan;Purulent 07/14/24 0400   Drainage Amount Moderate 07/14/24 0400   Dressing Foam;Xeroform 07/15/24 0745   Dressing Changed New 07/13/24 2200   Dressing Status Clean;Dry 07/15/24 0745       Wound 07/13/24 Skin Tear Arm Dorsal;Lower;Right (Active)   Wound Image   07/13/24 2228   Site Assessment Pink;Red 07/15/24 0745   Susan-Wound Assessment Pink 07/14/24 2011   Drainage Description Purulent;Tan 07/14/24 0400   Drainage Amount Moderate 07/14/24 0400   Dressing Xeroform;Foam 07/15/24 0745   Dressing Changed New 07/13/24 2200   Dressing Status Clean;Dry 07/15/24 0745       Wound 07/13/24 Skin Tear Elbow Dorsal;Right (Active)   Wound Image   07/13/24 2229   Site Assessment Pink 07/15/24 0745   Susan-Wound Assessment Pink;Ecchymotic 07/15/24 0745   Drainage Amount None 07/14/24 0400   Dressing Xeroform;Foam 07/15/24 0745   Dressing Status Clean;Dry 07/15/24 0745       Wound 07/13/24 Other (comment) Nose Right (Active)   Wound Image   07/14/24 0431   Site Assessment Pink 07/15/24 0745   Shape round 07/14/24 2011   Margins Well-defined edges 07/14/24 2011   Drainage Description None 07/15/24 0745   Drainage Amount None 07/15/24 0745   Dressing Open to air 07/15/24 0745       Wound Team Summary Assessment:   Restless, easily agitated 74 y/o CM was admitted to ICU 7-13-24 with sepsis and respiratory failure, Covid +.  Bilateral SWRs in place.  He is seem with the assist of his RN, Janki.  There are very small open  partial thickness wounds to right elbow and left forearm, well under 0.4 cm, mostly dry and scabbed.  The right distal forearm has a 4 x 4 cm area of partial thickness scattered small wounds each approx. 0.5 cm, small s/s drainage, pink beds and no SOI.  Gosl for all:  moist healing and protection using Xeroform and Mepilex dressings which should adhere well in spite of restless state.  Delmar does have a chronic, dry, eschar covered wound to right side of nose, suspected to be basal cell carcinoma per EMR review - this is dry /stable and may be left PAOLO.  Skin is otherwise intact, protective sacral Mepilex in use.  Cannot off-load heels 2/2 restless state.    Assessment and recommendations d/w attending provider, orders obtained and care provided.      Wound Team Plan:  Xeroform and Mepilex to wounds of forearms and right elbow.     Jorge Luis Baltazar RN  7/15/2024  5:17 PM

## 2024-07-15 NOTE — PROGRESS NOTES
07/15/24 0923   Discharge Planning   Living Arrangements Other (Comment)  ([facility]- Skilled)   Support Systems Children;Family members   Assistance Needed Patient was at [facility] in [place], [place] and was moved to [facility] where he was at for approximately 24 hours before coming to [facility]. Per son, up until his first chemo treatment in [place] he was walking without ambulation aides, A&O X3 and independent with ADLs. After his first chemo treatment he declined greatly. Per son patient was bedbound at [facility] in [place], [place], they were not getting him up to a WC. Patient is COVID positive and is currently requiring O2.   Type of Residence Private residence   Number of Stairs to Enter Residence 0   Number of Stairs Within Residence 0   Do you have animals or pets at home? No   Home or Post Acute Services Post acute facilities (Rehab/SNF/etc)   Type of Post Acute Facility Services Skilled nursing;Rehab   Expected Discharge Disposition SNF   Does the patient need discharge transport arranged? Yes   RoundTrip coordination needed? Yes   Has discharge transport been arranged? No   Patient Choice   Provider Choice list and CMS website (https://medicare.gov/care-compare#search) for post-acute Quality and Resource Measure Data were provided and reviewed with: Family   Patient / Family choosing to utilize agency / facility established prior to hospitalization Yes

## 2024-07-15 NOTE — PROGRESS NOTES
Delmar Casas is a 73 y.o. male on day 2 of admission presenting with Respiratory failure with hypoxia, unspecified chronicity (Multi).      Subjective   -Patient is new to me  -Under precautions for COVID-19  -Daughter and low is at bedside-discussed with her  -Improving serum sodium and serum creatinine.  Family are not aware of history of CKD.  Continues to be on D5W 75 cc/h.  Good urine output       Objective          Vitals 24HR  Heart Rate:  [41-75]   Temp:  [36.2 °C (97.2 °F)-37.1 °C (98.8 °F)]   Resp:  [13-28]   BP: ()/(45-81)   Weight:  [66.7 kg (147 lb 0.8 oz)]   SpO2:  [92 %-100 %]         Intake/Output last 3 Shifts:    Intake/Output Summary (Last 24 hours) at 7/15/2024 1109  Last data filed at 7/15/2024 1056  Gross per 24 hour   Intake 2453 ml   Output 790 ml   Net 1663 ml       Physical Exam      Limited exam due to COVID-19 to limit exposure and transmission  General appearance: no distress.  Not awake.  Lying flat in bed  Eyes: non-icteric  Skin: no apparent rash  Neuro: No FND,asterixis, no focal deficits noticed  Pickett catheter in place     [Held by provider] aspirin, 81 mg, oral, Daily  azithromycin, 500 mg, intravenous, q24h  dexAMETHasone, 6 mg, intravenous, q24h  heparin (porcine), 5,000 Units, subcutaneous, q8h  insulin lispro, 0-5 Units, subcutaneous, q6h  mupirocin, , Topical, BID  nystatin, 4 mL, Swish & Swallow, TID  oxygen, , inhalation, Continuous - Inhalation  piperacillin-tazobactam, 2.25 g, intravenous, q6h  remdesivir, 100 mg, intravenous, q24h  [Held by provider] rosuvastatin, 10 mg, oral, Daily  [Held by provider] senna, 10 mL, oral, BID  [Held by provider] tamsulosin, 0.4 mg, oral, Nightly  tiotropium, 2 puff, inhalation, Daily      RFP  Recent Labs     07/15/24  0538 07/14/24  1137 07/14/24  0514 07/14/24  0339 07/13/24  2326 07/13/24  1817 07/12/24  1250   * 151* 153* 152* 157* 165* 164*   K 3.5 3.8 4.1 4.0 4.1 4.3 4.4   * 117* 119* 119* 124* 125* 124*   CO2 23  23 23 23 24 25 23   BUN 68* 78* 83* 87* 93* 96* 84*   CREATININE 2.19* 2.92* 3.14* 3.41* 3.64* 4.14* 3.63*   GLUCOSE 148* 111* 162* 157* 172* 132* 96   CALCIUM 7.8* 7.9* 7.6* 7.6* 8.0* 9.1 9.3   ALBUMIN 2.4* 2.4* 2.3* 2.3* 2.6* 3.3* 3.3*   PHOS 3.5 3.2 3.1 3.1 4.5  4.5  --   --    EGFR 31* 22* 20* 18* 17* 14* 17*   ANIONGAP 13 15 15 14 13 19 21*        Urineanalysis  Recent Labs     07/13/24 2115   COLORU Light-Orange*   APPEARANCEU Ex.Turbid*   SPECGRAVU 1.018   JOLANTA 5.5   PROTUR 70 (1+)*   GLUCOSEU Normal   BLOODU 0.2 (2+)*   KETONESU NEGATIVE   BILIRUBINU NEGATIVE   NITRITEU NEGATIVE   LEUKOCYTESU 500 Sly/µL*       Urine Electrolytes  Recent Labs     07/13/24 2115 07/13/24 2105   SODIUMURR  --  39   NACREATUR  --  28   KUR  --  59   KCREATUR  --  43   CREATU  --  137.1   PROTUR 70 (1+)*  --         Urine Micro  Recent Labs     07/13/24 2115   WBCU >50*   RBCU >20*   HYALCASTU 3+*   SQUAMEPIU 1-9 (SPARSE)   BACTERIAU 1+*   MUCUSU 1+        Iron  Recent Labs     07/14/24  0514   FERRITIN 728*       @Mg@    Lab Results   Component Value Date    WBC 9.2 07/15/2024    HGB 9.0 (L) 07/15/2024    HCT 30.2 (L) 07/15/2024    MCV 97 07/15/2024     (L) 07/15/2024           Assessment/Plan     Delmar Casas is a 73 y.o. male with past medical history of malignant neoplasm of unspecified  part of right bronchus, s/p CT and immunotherapy(1 month ago), sepsis, recurrent UTI, CKD stage IV, oropharyngeal dysphagia, asthma (no home oxygen) , BPH, hematuria, hypokalemia, CAD, anemia, neutropenia, HLD, early dementia, mood disturbance, anxiety who presented to the ED for shortness of breath, gurgling in the throat and altered mental status.  Nephrology was consulted to manage acute kidney injury/severe hypernatremia      Principal Problem:    Respiratory failure with hypoxia, unspecified chronicity (Multi)  Active Problems:    Hypernatremia    Lung cancer (Multi)    COVID    Impression  # Acute kidney injury/CKD-with  history of decreased p.o. intake in the nursing facility.  Possible prerenal.  Improving with fluid resuscitation D5W.  Currently nonoliguric.  No clear baseline serum creatinine.  Family denies history of CKD.  Serum creatinine today 2.1-improved from prior    # Severe hypernatremia serum sodium 165 on presentation-most likely dehydration.-improving with resuscitation was IV D5W.  Today 146    # Altered mental status/early dementia    # COVID-19 positive    # Acute hypoxic respiratory failure/leukocytosis-currently on azithromycin/vancomycin/Zosyn    # Chronic Pickett catheter     Recommendation plan  -Continue D5W 75 cc/h until patient is able to receive p.o. fluid intake  -High risk for decompensation  -Continue Pickett catheter at this time.  Voiding trial when patient is more awake    Plan was discussed with ICU team      Kitty Tan MD

## 2024-07-15 NOTE — PROGRESS NOTES
"Vancomycin Dosing by Pharmacy- FOLLOW UP    Delmar Casas 73YM  Ht 5'10\" Wt 147#(67kg)    Hospital Day #2/Vancomycin Day #2  Pharmacy has been consulted for vancomycin dosing for fever/resp infection. Based on the patient's indication and renal status this patient is being dosed based on a goal trough/random level of 15-20.     Renal function is currently improving.  Clcr 31 ml/min    Current vancomycin dose: 500 mg given x1 dose given 7/14 at 1914.  1.5g x1 given 7/13 at 1919    Most recent random level: 12.8 mcg/mL drawn today at 1758    Visit Vitals  BP (!) 113/97   Pulse 53   Temp 36.4 °C (97.5 °F)   Resp 18        Lab Results   Component Value Date    CREATININE 1.95 (H) 07/15/2024    CREATININE 2.19 (H) 07/15/2024    CREATININE 2.92 (H) 07/14/2024    CREATININE 3.14 (H) 07/14/2024      Cultures:  Susceptibility data for the encounter in last 14 days.  Collected Specimen Info Organism   07/13/24 Swab from Anterior Nares Methicillin Resistant Staphylococcus aureus (MRSA)   07/13/24 Urine from Indwelling (Pickett) Catheter Staphylococcus aureus          Assessment/Plan    Vancomycin 500mg x1 dose ordered for 2100 today. Push level to 15-20 range    The next level will be obtained on 7/16 w/ am labs to minimize lab draws  Will continue to monitor renal function daily while on vancomycin and order serum creatinine at least every 48 hours if not already ordered.  Follow for continued vancomycin needs, clinical response, and signs/symptoms of toxicity.       Idris Hopkins, PharmD           "

## 2024-07-15 NOTE — PROGRESS NOTES
Speech-Language Pathology                 Therapy Communication Note    Patient Name: Delmar Casas  MRN: [id number]  Today's Date: 7/15/2024   Time: 10:14    Discipline: Speech Language Pathology    Missed Visit Reason: Evaluation deferred- nursing reported that he is not alert for oral trials. She recommended that we try again tomorrow.     Missed Time: Attempt    Comment: Will try again tomorrow.

## 2024-07-15 NOTE — PROGRESS NOTES
Occupational Therapy                 Therapy Communication Note    Patient Name: Delmar Casas  MRN: [id number]  Today's Date: 7/15/2024     Discipline: Occupational Therapy    Missed Visit Reason: Communicated via SecureChat with Nsg; pt continues to demonstrate intermittent command-following, and in safety restrains at this time. Recommended to hold therapy evaluations this date.     Missed Time: Attempt at 0850    Comment: Pt not appropriate

## 2024-07-15 NOTE — PROGRESS NOTES
Physical Therapy                 Therapy Communication Note    Patient Name: Delmar Casas  MRN: [id number]  Today's Date: 7/15/2024     Discipline: Physical Therapy    Missed Visit Reason: Missed Visit Reason:  (Per communication with RN via Secure chat, patient is in restraints and is not yet appropriate for participation in therapy.  PT eval held today.)    Missed Time: Attempt 8:54    Comment: Pt not appropriate

## 2024-07-15 NOTE — CARE PLAN
The patient's goals for the shift include to get some rest.    The clinical goals for the shift include Pt will wean to RA through the night.

## 2024-07-15 NOTE — PROGRESS NOTES
"Subjective   Subjective:     Overnight Events  No acute events overnight     Subjective  Patient seen and examined, A&O x1-2 to name and  but unable to answer where he is. Not communicating much but denies any complaints at this time. Currently on 1L NC.     Review Of Systems:  12-point ROS was performed and is negative except as noted below and in the HPI.      Objective   Objective:     BP 95/52   Pulse 75   Temp 36.3 °C (97.3 °F)   Resp 22   Ht 1.778 m (5' 10\")   Wt 66.7 kg (147 lb 0.8 oz)   SpO2 95%   BMI 21.10 kg/m²     Physical Exam  Constitutional:       General: He is not in acute distress.     Appearance: Normal appearance. He is ill-appearing.   Neck:      Vascular: No carotid bruit.   Cardiovascular:      Rate and Rhythm: Normal rate and regular rhythm.      Pulses: Normal pulses.      Heart sounds: Normal heart sounds. No murmur heard.     No friction rub. No gallop.   Pulmonary:      Effort: Pulmonary effort is normal. No respiratory distress.      Breath sounds: Normal breath sounds. No wheezing, rhonchi or rales.   Abdominal:      General: Abdomen is flat. Bowel sounds are normal. There is no distension.      Palpations: Abdomen is soft. There is no mass.      Tenderness: There is no abdominal tenderness. There is no guarding or rebound.   Musculoskeletal:         General: No swelling or tenderness. Normal range of motion.      Cervical back: Normal range of motion and neck supple. No rigidity or tenderness.   Skin:     General: Skin is warm and dry.      Coloration: Skin is not jaundiced.      Findings: No erythema or rash.   Neurological:      Motor: No weakness.      Comments: A&O x1-2 to name and  but does not know where he is, engages minimally    Psychiatric:         Mood and Affect: Mood normal.         Behavior: Behavior normal.       Lab Work:     Lab Results   Component Value Date    WBC 9.2 07/15/2024    HGB 9.0 (L) 07/15/2024    HCT 30.2 (L) 07/15/2024    MCV 97 07/15/2024    " " (L) 07/15/2024     Lab Results   Component Value Date    GLUCOSE 148 (H) 07/15/2024    CALCIUM 7.8 (L) 07/15/2024     (H) 07/15/2024    K 3.5 07/15/2024    CO2 23 07/15/2024     (H) 07/15/2024    BUN 68 (H) 07/15/2024    CREATININE 2.19 (H) 07/15/2024     No results found for: \"HGBA1C\", \"TSH\", \"VITD25\"  Cultures:   Susceptibility data from last 90 days.  Collected Specimen Info Organism   07/13/24 Swab from Anterior Nares Methicillin Resistant Staphylococcus aureus (MRSA)   07/13/24 Urine from Indwelling (Pickett) Catheter Staphylococcus aureus     Images:     CT chest wo IV contrast   Final Result   Tree-in-bud opacities throughout the lungs is suspicious for   bronchopneumonia.        Extensive coronary atherosclerosis is present but no evidence of   cardiomegaly, pulmonary edema, or pleural effusions.        Right upper lobe bronchiectasis and scarred consolidation are noted   which can be seen in the setting of prior right upper lobe radiation   treatment. If patient has prior lung malignancy treated with   radiation, then recommend comparing with prior oncologic exams to   ensure stability. If patient does not have prior radiation treatment,   then recommend pulmonology consultation and further characterization   with PET-CT.        Left adrenal gland nodule measuring 2.7 cm is indeterminate. If   outside imaging is available, recommend comparison. Otherwise,   recommend characterization with adrenal protocol CT.        Subtle nodular contour of the liver parenchyma raises suspicion for   underlying liver disease or early cirrhosis.             MACRO:   Critical Finding:  See findings. Notification was initiated on   7/13/2024 at 7:19 pm by  Cristiano Brown.  (**-YCF-**) Instructions:        Signed by: Cristiano Brown 7/13/2024 7:20 PM   Dictation workstation:   [id number]         Medications:     Scheduled:  [Held by provider] aspirin, 81 mg, oral, Daily  azithromycin, 500 mg, intravenous, " q24h  dexAMETHasone, 6 mg, intravenous, q24h  heparin (porcine), 5,000 Units, subcutaneous, q8h  insulin lispro, 0-5 Units, subcutaneous, q6h  mupirocin, , Topical, BID  nystatin, 4 mL, Swish & Swallow, TID  oxygen, , inhalation, Continuous - Inhalation  piperacillin-tazobactam, 2.25 g, intravenous, q6h  remdesivir, 100 mg, intravenous, q24h  [Held by provider] rosuvastatin, 10 mg, oral, Daily  [Held by provider] senna, 10 mL, oral, BID  [Held by provider] tamsulosin, 0.4 mg, oral, Nightly  tiotropium, 2 puff, inhalation, Daily    Continuous:  dextrose 5%, 75 mL/hr, Last Rate: 75 mL/hr (07/15/24 9207)      PRN:  PRN medications: acetaminophen **OR** acetaminophen **OR** acetaminophen, albuterol, bisacodyl, dextrose, glucagon, moisturizing mouth, vancomycin     Assessment & Plan:   Delmar Casas is a 73 y.o. male with past medical history of malignant neoplasm of unspecified part of right bronchus, s/p CT and immunotherapy(1 month ago), sepsis, recurrent UTI, CKD stage IV, oropharyngeal dysphagia, asthma (no home oxygen) , BPH, hematuria, hypokalemia, CAD, anemia, neutropenia, HLD, early dementia, mood disturbance, anxiety admitted to the ICU with AHRF 2/2 PNA, COVID positive, severe hypernatremia and LUPILLO on CKD.     NEURO/PSYCH:  # Acute metabolic encephalopathy due to hypernatremia and PNA  # Early dementia  # Anxiety, mood disturbance  - Hold home hydroxyzine 5 mg and Ativan 0.5 mg PRN  - Monitor for ICU delirium   - Fall precautions in place, soft restraints for agitation  - Treating hypernatremia and pna as below     CARDIOVASCULAR:  # CAD  # HLD  # Prolonged Qtc (resolved)  - Holding aspirin 81mg and rosuvastatin 10mg while NPO     PULMONARY:  # AHRF 2/2 HAP   # COVID positive  # Hx of lung cancer  # Hx of asthma/COPD   - Pt presented with AHRF requiring 15 L HFNC, later placed on Airvo, currently on 1L NC  - Hx 120 pack year tobacco use   - CT chest bronchopneumonia, Right upper lobe bronchiectasis and scarred  consolidation are noted which can be seen in the setting of prior right upper lobe radiation treatment  - MRSA nares (+), high D-dimer 1,808, fibrinogen 510  - Vascular US lower extremity negative  - CT angio chest PE negative for PE   - Continue Vanc, Zosyn, Azithro   - Continue Remdesivir and Dex per COVID protocol   - On Spiriva Respimat daily and albuterol q6h PRN  - ICS, Acapella, chest PT   - Aspiration precautions  - NPO, SLP ordered but pt not alert enough to participate     RENAL/UROLOGY:  # Severe hypernatremia  # Hyperchloremia  - Pt presented with AMS, Na 165 on admission   - 2/2 dehydration and decreased po intake (not eating much d/t sore throat)   - Nephrology following, appreciate recs   - Decrease rate of D5 to 75cc/hr   - Trend RFP BID      # LUPILLO on CKD (Stage 4)  # Hx of recurrent UTI   # Chronic Pickett  - Cr 3.63 on admission, baseline not clear as no previous records available  - Previously being treated for recurrent UTI  - UA significant for high LE and pyuria  - Urine culture with no growth   - Nephrology following, appreciate recs   - Has had Pickett for 2 months, was placed during last hospital stay in [place]. Exchanged 7/14, will attempt to remove and do voiding trial once more awake      # Left adrenal gland nodule  - CT chest showed left adrenal nodule measuring 2.7 centimeters  - Radiology recommended comparison-outside imaging available, otherwise characterization with adrenal protocol CT     # BPH  - Hold Tamsulosin while NPO     GASTROINTESTINAL:  # Oropharyngeal dysphagia  # Decreased p.o. intake   # Sore throat s/p CT and immunotherapy  # Liver nodule (possible cirrhosis/liver disease)  - NPO, SLP ordered but pt not alert enough to participate   - Aspiration precaution  - On bowel regimen for constipation  - Monitor liver nodule     HEME/ONC:  # Malignant neoplasm of unspecified  part of right bronchus  # S/P CT and Immunotherapy  # History of basal cell carcinoma on the nose  # Hx  Anemia  - Had recent chemotherapy and immunotherapy about a month ago  - Developed sepsis followed by hospitalization and SNF after chemo  - High D-dimer 1,808, PT/INR normal  - LE DVT US negative   - CT angio chest negative for PE   - Holding PO iron     ENDOCRINE:  # Mild hyperglycemia   - 2/2 5% dextrose  - No history of DM  - On NPO SSI    INFECTIOUS:  # Sepsis 2/2 PNA vs UTI   - SIRS score 3, lactate normal 1.9  - LR 1 L bolus, vancomycin and Zosyn in the ED.  - On D5 @ 75 ml/hr  - Urine culture (7/13/24): Staph aureus   - Blood cultures (7/13/24): NGTD     # PNA  # COVID positive  - See pulm above    MSK/SKIN:  # Left hand wound  - Wound care consulted    Fluid: D5 @ 75cc/hr   Electrolytes: Replete PRN  Nutrition: NPO   GI ppx: None   DVT/PE ppx: Heparin   Abx: Vanc, Zosyn, Azithro   Drips: None   Lines/Tubes/Drains: Left chest Mediport, left sided midline (from prior to admission), PIV, Pickett changed (7/14-)   Oxygen: 1L NC     Code: Full code     Dispo: Stable for transfer to any tele floor.     Yohana Jiménez, PGY-3  Internal Medicine

## 2024-07-15 NOTE — CARE PLAN
SW consulted due to pt being a .  [facility] and faxed clinicals per protocol.  Will follow.

## 2024-07-16 LAB
ALBUMIN SERPL BCP-MCNC: 2.3 G/DL (ref 3.4–5)
ANION GAP SERPL CALC-SCNC: 12 MMOL/L (ref 10–20)
ATRIAL RATE: 113 BPM
BACTERIA UR CULT: ABNORMAL
BUN SERPL-MCNC: 52 MG/DL (ref 6–23)
CALCIUM SERPL-MCNC: 7.8 MG/DL (ref 8.6–10.3)
CHLORIDE SERPL-SCNC: 112 MMOL/L (ref 98–107)
CO2 SERPL-SCNC: 24 MMOL/L (ref 21–32)
CREAT SERPL-MCNC: 1.71 MG/DL (ref 0.5–1.3)
EGFRCR SERPLBLD CKD-EPI 2021: 41 ML/MIN/1.73M*2
ERYTHROCYTE [DISTWIDTH] IN BLOOD BY AUTOMATED COUNT: 16.9 % (ref 11.5–14.5)
GLUCOSE BLD MANUAL STRIP-MCNC: 127 MG/DL (ref 74–99)
GLUCOSE BLD MANUAL STRIP-MCNC: 136 MG/DL (ref 74–99)
GLUCOSE BLD MANUAL STRIP-MCNC: 140 MG/DL (ref 74–99)
GLUCOSE SERPL-MCNC: 159 MG/DL (ref 74–99)
HCT VFR BLD AUTO: 29.3 % (ref 41–52)
HGB BLD-MCNC: 9 G/DL (ref 13.5–17.5)
MAGNESIUM SERPL-MCNC: 1.88 MG/DL (ref 1.6–2.4)
MCH RBC QN AUTO: 29.3 PG (ref 26–34)
MCHC RBC AUTO-ENTMCNC: 30.7 G/DL (ref 32–36)
MCV RBC AUTO: 95 FL (ref 80–100)
NRBC BLD-RTO: 0 /100 WBCS (ref 0–0)
P AXIS: 89 DEGREES
P OFFSET: 189 MS
P ONSET: 164 MS
PHOSPHATE SERPL-MCNC: 3.2 MG/DL (ref 2.5–4.9)
PLATELET # BLD AUTO: 149 X10*3/UL (ref 150–450)
POTASSIUM SERPL-SCNC: 3.6 MMOL/L (ref 3.5–5.3)
PR INTERVAL: 122 MS
Q ONSET: 215 MS
QRS COUNT: 19 BEATS
QRS DURATION: 84 MS
QT INTERVAL: 396 MS
QTC CALCULATION(BAZETT): 543 MS
QTC FREDERICIA: 489 MS
R AXIS: 64 DEGREES
RBC # BLD AUTO: 3.07 X10*6/UL (ref 4.5–5.9)
SODIUM SERPL-SCNC: 144 MMOL/L (ref 136–145)
T AXIS: 188 DEGREES
T OFFSET: 413 MS
VANCOMYCIN SERPL-MCNC: 16.9 UG/ML (ref 5–20)
VENTRICULAR RATE: 113 BPM
WBC # BLD AUTO: 8 X10*3/UL (ref 4.4–11.3)

## 2024-07-16 PROCEDURE — 2500000001 HC RX 250 WO HCPCS SELF ADMINISTERED DRUGS (ALT 637 FOR MEDICARE OP)

## 2024-07-16 PROCEDURE — 80202 ASSAY OF VANCOMYCIN: CPT

## 2024-07-16 PROCEDURE — 94760 N-INVAS EAR/PLS OXIMETRY 1: CPT

## 2024-07-16 PROCEDURE — 85027 COMPLETE CBC AUTOMATED: CPT

## 2024-07-16 PROCEDURE — 71045 X-RAY EXAM CHEST 1 VIEW: CPT | Performed by: RADIOLOGY

## 2024-07-16 PROCEDURE — 2500000004 HC RX 250 GENERAL PHARMACY W/ HCPCS (ALT 636 FOR OP/ED)

## 2024-07-16 PROCEDURE — 92610 EVALUATE SWALLOWING FUNCTION: CPT | Mod: GN

## 2024-07-16 PROCEDURE — 99233 SBSQ HOSP IP/OBS HIGH 50: CPT

## 2024-07-16 PROCEDURE — 80069 RENAL FUNCTION PANEL: CPT

## 2024-07-16 PROCEDURE — 94660 CPAP INITIATION&MGMT: CPT

## 2024-07-16 PROCEDURE — C9113 INJ PANTOPRAZOLE SODIUM, VIA: HCPCS

## 2024-07-16 PROCEDURE — 82947 ASSAY GLUCOSE BLOOD QUANT: CPT

## 2024-07-16 PROCEDURE — 83735 ASSAY OF MAGNESIUM: CPT

## 2024-07-16 PROCEDURE — 99233 SBSQ HOSP IP/OBS HIGH 50: CPT | Performed by: INTERNAL MEDICINE

## 2024-07-16 PROCEDURE — 3E0G76Z INTRODUCTION OF NUTRITIONAL SUBSTANCE INTO UPPER GI, VIA NATURAL OR ARTIFICIAL OPENING: ICD-10-PCS | Performed by: INTERNAL MEDICINE

## 2024-07-16 PROCEDURE — 37799 UNLISTED PX VASCULAR SURGERY: CPT

## 2024-07-16 PROCEDURE — 1200000002 HC GENERAL ROOM WITH TELEMETRY DAILY

## 2024-07-16 PROCEDURE — 2500000004 HC RX 250 GENERAL PHARMACY W/ HCPCS (ALT 636 FOR OP/ED): Performed by: INTERNAL MEDICINE

## 2024-07-16 PROCEDURE — 2500000005 HC RX 250 GENERAL PHARMACY W/O HCPCS: Performed by: INTERNAL MEDICINE

## 2024-07-16 RX ORDER — PANTOPRAZOLE SODIUM 40 MG/10ML
40 INJECTION, POWDER, LYOPHILIZED, FOR SOLUTION INTRAVENOUS DAILY
Status: DISCONTINUED | OUTPATIENT
Start: 2024-07-16 | End: 2024-07-23 | Stop reason: HOSPADM

## 2024-07-16 RX ORDER — DEXTROSE MONOHYDRATE 50 MG/ML
50 INJECTION, SOLUTION INTRAVENOUS CONTINUOUS
Status: DISCONTINUED | OUTPATIENT
Start: 2024-07-16 | End: 2024-07-16

## 2024-07-16 RX ORDER — VANCOMYCIN HYDROCHLORIDE 500 MG/100ML
500 INJECTION, SOLUTION INTRAVENOUS ONCE
Status: COMPLETED | OUTPATIENT
Start: 2024-07-16 | End: 2024-07-16

## 2024-07-16 RX ADMIN — PIPERACILLIN SODIUM AND TAZOBACTAM SODIUM 2.25 G: 2; .25 INJECTION, SOLUTION INTRAVENOUS at 08:10

## 2024-07-16 RX ADMIN — NYSTATIN 400000 UNITS: 100000 SUSPENSION ORAL at 17:35

## 2024-07-16 RX ADMIN — Medication 15 ML: at 08:11

## 2024-07-16 RX ADMIN — HEPARIN SODIUM 5000 UNITS: 5000 INJECTION INTRAVENOUS; SUBCUTANEOUS at 08:10

## 2024-07-16 RX ADMIN — HEPARIN SODIUM 5000 UNITS: 5000 INJECTION INTRAVENOUS; SUBCUTANEOUS at 17:35

## 2024-07-16 RX ADMIN — Medication 3 L/MIN: at 20:00

## 2024-07-16 RX ADMIN — NYSTATIN 400000 UNITS: 100000 SUSPENSION ORAL at 08:10

## 2024-07-16 RX ADMIN — PIPERACILLIN SODIUM AND TAZOBACTAM SODIUM 2.25 G: 2; .25 INJECTION, SOLUTION INTRAVENOUS at 19:32

## 2024-07-16 RX ADMIN — TIOTROPIUM BROMIDE INHALATION SPRAY 2 PUFF: 3.12 SPRAY, METERED RESPIRATORY (INHALATION) at 08:11

## 2024-07-16 RX ADMIN — VANCOMYCIN HYDROCHLORIDE 500 MG: 500 INJECTION, SOLUTION INTRAVENOUS at 11:26

## 2024-07-16 RX ADMIN — PIPERACILLIN SODIUM AND TAZOBACTAM SODIUM 2.25 G: 2; .25 INJECTION, SOLUTION INTRAVENOUS at 13:00

## 2024-07-16 RX ADMIN — Medication 3 L/MIN: at 08:00

## 2024-07-16 RX ADMIN — PANTOPRAZOLE SODIUM 40 MG: 40 INJECTION, POWDER, FOR SOLUTION INTRAVENOUS at 13:10

## 2024-07-16 RX ADMIN — PIPERACILLIN SODIUM AND TAZOBACTAM SODIUM 2.25 G: 2; .25 INJECTION, SOLUTION INTRAVENOUS at 01:32

## 2024-07-16 RX ADMIN — MUPIROCIN: 20 OINTMENT TOPICAL at 08:10

## 2024-07-16 RX ADMIN — MUPIROCIN 1 APPLICATION: 20 OINTMENT TOPICAL at 21:00

## 2024-07-16 RX ADMIN — REMDESIVIR 100 MG: 100 INJECTION, POWDER, LYOPHILIZED, FOR SOLUTION INTRAVENOUS at 02:29

## 2024-07-16 RX ADMIN — NYSTATIN 400000 UNITS: 100000 SUSPENSION ORAL at 21:00

## 2024-07-16 ASSESSMENT — PAIN SCALES - PAIN ASSESSMENT IN ADVANCED DEMENTIA (PAINAD)
BREATHING: SMILING OR INEXPRESSIVE
FACIALEXPRESSION: SMILING OR INEXPRESSIVE
TOTALSCORE: 0
BREATHING: NORMAL
CONSOLABILITY: NO NEED TO CONSOLE
BREATHING: NORMAL
BODYLANGUAGE: RELAXED
TOTALSCORE: 0
TOTALSCORE: NO NEED TO CONSOLE
BODYLANGUAGE: RELAXED

## 2024-07-16 ASSESSMENT — PAIN SCALES - GENERAL: PAINLEVEL_OUTOF10: 0 - NO PAIN

## 2024-07-16 ASSESSMENT — PAIN - FUNCTIONAL ASSESSMENT
PAIN_FUNCTIONAL_ASSESSMENT: PAINAD (PAIN ASSESSMENT IN ADVANCED DEMENTIA SCALE)
PAIN_FUNCTIONAL_ASSESSMENT: PAINAD (PAIN ASSESSMENT IN ADVANCED DEMENTIA SCALE)

## 2024-07-16 NOTE — PROGRESS NOTES
Vancomycin Dosing by Pharmacy- FOLLOW UP    Delmar Casas is a 73 y.o. year old male who Pharmacy has been consulted for vancomycin dosing for pneumonia. Based on the patient's indication and renal status this patient is being dosed based on a goal trough/random level of 15-20.     Renal function is currently improving.    Current vancomycin dose: 500 mg given x1     Most recent random level: 16.8 mcg/mL    Visit Vitals  /57   Pulse 57   Temp 36.1 °C (97 °F)   Resp 23        Lab Results   Component Value Date    CREATININE 1.71 (H) 2024    CREATININE 1.95 (H) 07/15/2024    CREATININE 2.19 (H) 07/15/2024    CREATININE 2.92 (H) 2024        Patient weight is as follows:   Vitals:    24 0600   Weight: 69.2 kg (152 lb 8.9 oz)       Cultures:  Susceptibility data for the encounter in last 14 days.  Collected Specimen Info Organism   24 Swab from Anterior Nares Methicillin Resistant Staphylococcus aureus (MRSA)   24 Urine from Indwelling (Pickett) Catheter Staphylococcus aureus        I/O last 3 completed shifts:  In: 4251.8 (61.4 mL/kg) [I.V.:2651.8 (38.3 mL/kg); IV Piggyback:1600]  Out: 1265 (18.3 mL/kg) [Urine:1265 (0.5 mL/kg/hr)]  Weight: 69.2 kg   I/O during current shift:  I/O this shift:  In: 50 [IV Piggyback:50]  Out: -     Temp (24hrs), Av.2 °C (97.2 °F), Min:36 °C (96.8 °F), Max:36.4 °C (97.5 °F)      Assessment/Plan    Within goal random/trough level  The next level will be obtained on  at 0500. May be obtained sooner if clinically indicated.   Will continue to monitor renal function daily while on vancomycin and order serum creatinine at least every 48 hours if not already ordered.  Follow for continued vancomycin needs, clinical response, and signs/symptoms of toxicity.       Cristal Fairbanks RPh

## 2024-07-16 NOTE — PROGRESS NOTES
Physical Therapy                 Therapy Communication Note    Patient Name: Delmar Casas  MRN: [id number]  Today's Date: 7/16/2024     Discipline: Physical Therapy    Missed Visit Reason: Missed Visit Reason: Patient placed on medical hold (Per communication with OTR who spoke with RN, Pt is still restrained and not appropriate fo therapy. No evalution)    Missed Time: Attempt    Comment:

## 2024-07-16 NOTE — CONSULTS
"Patient has Malnutrition Diagnosis:  (Unable to determine 2/2 limited history)  Nutrition Assessment    Reason for Assessment: Admission nursing screening, Tube feeding assessment and order, Provider consult order    Patient is a 73 y.o. male presenting with: Respiratory failure with hypoxia, unspecified chronicity; + for COVID-19     PMHx: malignant neoplasm of unspecified  part of right bronchus, s/p CT and immunotherapy(1 month ago), sepsis, recurrent UTI, CKD stage IV, oropharyngeal dysphagia, asthma (no home oxygen) , BPH, hematuria, hypokalemia, CAD, anemia, neutropenia, HLD, early dementia, mood disturbance, anxiety     Nutrition History:  Food and Nutrient History: PT NPO x 3-4 days, possibly longer. Pt recently moved from [place], was at [facility] x 1 day PTA. pt has not been alert enough for oral intakes or to participate with SLP eval.  At NH, pt was hayley Puree diet with NTL, no straws. Pt now has NGT, plan to initiate enteral nutrition once x-ray confirms placement. IVF D5@ 50 ml/hr= 204 kcal, 60 g CHO in 1200 ml volume/24 hours  Energy Intake: Poor < 50 %  No Known Allergies   GI Symptoms: None     Oral Problems: Swallowing difficulty          Anthropometrics:  Height: 177.8 cm (5' 10\")   Weight: 69.2 kg (152 lb 8.9 oz) (done by nightshift RN)   BMI (Calculated): 21.89  IBW/kg (Dietitian Calculated): 75.5 kg  Percent of IBW: 92 %       Weight History:     Daily Weight  07/16/24 : 69.2 kg (152 lb 8.9 oz)    Weight         7/14/2024  0800 7/15/2024  0539 7/15/2024  0745 7/16/2024  0600 7/16/2024  0800    Weight: 64.3 kg (141 lb 12.1 oz) 66.7 kg (147 lb 0.8 oz) 66.7 kg (147 lb 0.8 oz) 69.2 kg (152 lb 8.9 oz) 69.2 kg (152 lb 8.9 oz)            Weight Change %:  Weight History / % Weight Change: 66.7 kg (7/15/24); 64.3 kg (7/13/24) adm weight (weight history n/a to evaluate for changes)             Nutrition Focused Physical Exam Findings:    Subcutaneous Fat Loss  Orbital Fat Pads: Defer (unable to complete NFPE " "2/2 pt in isolation for COVID-19)     Edema  Edema: +2 mild (bilat UE and LE)  Physical Findings (Nutrition Deficiency/Toxicity)  Skin:  (skin tears to L hand, R arm, R elbow, and nose)    Nutrition Significant Labs:    Results from last 7 days   Lab Units 07/16/24  0510 07/15/24  1758 07/15/24  0538 07/14/24  1137 07/14/24  0514   GLUCOSE mg/dL 159* 135* 148*   < > 162*   SODIUM mmol/L 144 148* 146*   < > 153*   POTASSIUM mmol/L 3.6 3.6 3.5   < > 4.1   CHLORIDE mmol/L 112* 113* 114*   < > 119*   CO2 mmol/L 24 24 23   < > 23   BUN mg/dL 52* 57* 68*   < > 83*   CREATININE mg/dL 1.71* 1.95* 2.19*   < > 3.14*   EGFR mL/min/1.73m*2 41* 35* 31*   < > 20*   CALCIUM mg/dL 7.8* 7.9* 7.8*   < > 7.6*   PHOSPHORUS mg/dL 3.2 3.6 3.5   < > 3.1   MAGNESIUM mg/dL 1.88  --  2.07  --  2.25    < > = values in this interval not displayed.     No results found for: \"HGBA1C\"  Results from last 7 days   Lab Units 07/16/24  1109 07/16/24  0002 07/15/24  1129 07/15/24  0023 07/14/24  1751 07/14/24  1131 07/14/24  0444   POCT GLUCOSE mg/dL 140* 136* 136* 120* 130* 117* 149*     Lab Results   Component Value Date    ALBUMIN 2.3 (L) 07/16/2024      No results found for: \"CRP\"    Nutrition Specific Medications:   Scheduled medications:  [Held by provider] aspirin, 81 mg, oral, Daily  dexAMETHasone, 6 mg, intravenous, q24h  heparin (porcine), 5,000 Units, subcutaneous, q8h  insulin lispro, 0-5 Units, subcutaneous, q6h  mupirocin, , Topical, BID  nystatin, 4 mL, Swish & Swallow, TID  oxygen, , inhalation, Continuous - Inhalation  piperacillin-tazobactam, 2.25 g, intravenous, q6h  remdesivir, 100 mg, intravenous, q24h  [Held by provider] rosuvastatin, 10 mg, oral, Daily  [Held by provider] senna, 10 mL, oral, BID  [Held by provider] tamsulosin, 0.4 mg, oral, Nightly  tiotropium, 2 puff, inhalation, Daily      Continuous medications:  dextrose 5%, 50 mL/hr, Last Rate: 50 mL/hr (07/16/24 1054)      PRN medications:  PRN medications: acetaminophen " **OR** acetaminophen **OR** acetaminophen, albuterol, bisacodyl, dextrose, glucagon, moisturizing mouth, vancomycin     Nursing Data Per flowsheet:   Stool Appearance: Soft, Formed (07/16/24 0100)  Gastrointestinal  Gastrointestinal (WDL): Exceptions to WDL  Bowel Sounds: All quadrants  Bowel Sounds (All Quadrants): Active  Last BM Date: 07/16/24  Bowel Incontinence: Yes  Stool Appearance: Soft, Formed  Stool Color: Brown  Gastrointestinal Symptoms: Loss of appetite  Feeding assistance level:      Intake/Output Summary (Last 24 hours) at 7/16/2024 1226  Last data filed at 7/16/2024 1156  Gross per 24 hour   Intake 3148.75 ml   Output 1150 ml   Net 1998.75 ml      0-10 (Numeric) Pain Score: 0 - No pain  Critical-Care Pain Observation Score:  [0]   Barron-Baker FACES Pain Rating: No hurt  PAINAD Score:  [0]    Dietary Orders (From admission, onward)       Start     Ordered    07/15/24 1026  May Participate in Room Service  Once        Question:  .  Answer:  Yes    07/15/24 1026    07/13/24 2239  NPO Diet; Effective now  Diet effective now         07/13/24 2239                     Estimated Needs:   Total Energy Estimated Needs (kCal):  (2047-9542 kcal (25-30 kcal/kg))     Total Protein Estimated Needs (g):  ( g protein (1.2-1.5 g/kg))     Total Fluid Estimated Needs (mL):  (1 ml/kcal)          Nutrition Diagnosis   Malnutrition Diagnosis  Patient has Malnutrition Diagnosis:  (Unable to determine 2/2 limited history)    Nutrition Diagnosis  Patient has Nutrition Diagnosis: Yes  Diagnosis Status (1): New  Nutrition Diagnosis 1: Inadequate protein energy intake  Related to (1): inability to take oral diet with altered mental status  As Evidenced by (1): no oral intakes x 3-4 days, possibly longer  Additional Assessment Information (1): Plan to initiate EN via NGT       Nutrition Interventions/Recommendations   Nutrition Prescription:  EN    Nutrition Interventions:   Interventions: Enteral intake  Goal: Jevity 1.5 @  50 ml/hr= 1800 kcal, 77 g protein, 912 ml free water in 1200 ml volume/ 24 hours; additional 150 ml FWF every 4 hours= 1812 ml free water/24 hours      Coordination of Care: Dr. Jiménez, resident; ERIKA Shearer  Nutrition Education:   N/A  Recommendations:  Start Jevity 1.5 @ 20 ml/hr x 24 hours via NGT. After 24 hours, if no major shifts in electrolytes and blood sugars are acceptable, increase TF by 10 ml/hr every 6-8 hours until goal met at 50 ml/hr.  Water: 150 ml FWF every 4 hours via NGT  Weights: daily  RFP, Mg daily; replete lytes prn   GOC per palliative care  Diet consistency per SLP when able to participate with evaluation         Nutrition Monitoring and Evaluation   Food/Nutrient Related History Monitoring  Monitoring and Evaluation Plan: Energy intake, Enteral and parenteral nutrition intake  Criteria: Pt will tolerate TF at goal rate of 50 ml/hr within next 3-4 days    Body Composition/Growth/Weight History  Monitoring and Evaluation Plan: Weight  Weight: Measured weight  Criteria: Monitor weights as available    Biochemical Data, Medical Tests and Procedures  Monitoring and Evaluation Plan: Electrolyte/renal panel, Glucose/endocrine profile  Criteria: Monitor labs as available                 Time Spent (min): 60 minutes

## 2024-07-16 NOTE — PROGRESS NOTES
"Subjective   Subjective:   Overnight Events  No acute events overnight     Subjective  Patient seen and examined, A&O x1-2 to name but will not engage in conversation. Appears comfortable and in no distress.     Review Of Systems:  12-point ROS was performed and is negative except as noted below and in the HPI.      Objective   Objective:     BP (!) 89/48   Pulse 50   Temp 36 °C (96.8 °F) (Temporal)   Resp 26   Ht 1.778 m (5' 10\")   Wt 69.2 kg (152 lb 8.9 oz) Comment: done by nightshift RN  SpO2 100%   BMI 21.89 kg/m²     Physical Exam  Constitutional:       General: He is not in acute distress.     Appearance: Normal appearance. He is ill-appearing.   Neck:      Vascular: No carotid bruit.   Cardiovascular:      Rate and Rhythm: Normal rate and regular rhythm.      Pulses: Normal pulses.      Heart sounds: Normal heart sounds. No murmur heard.     No friction rub. No gallop.   Pulmonary:      Effort: Pulmonary effort is normal. No respiratory distress.      Breath sounds: Normal breath sounds. No wheezing, rhonchi or rales.   Abdominal:      General: Abdomen is flat. Bowel sounds are normal. There is no distension.      Palpations: Abdomen is soft. There is no mass.      Tenderness: There is no abdominal tenderness. There is no guarding or rebound.   Musculoskeletal:         General: No swelling or tenderness. Normal range of motion.      Cervical back: Normal range of motion and neck supple. No rigidity or tenderness.   Skin:     General: Skin is warm and dry.      Coloration: Skin is not jaundiced.      Findings: No erythema or rash.   Neurological:      Motor: No weakness.      Comments: A&O x1-2 to name, engages minimally    Psychiatric:         Mood and Affect: Mood normal.         Behavior: Behavior normal.       Lab Work:     Lab Results   Component Value Date    WBC 8.0 07/16/2024    HGB 9.0 (L) 07/16/2024    HCT 29.3 (L) 07/16/2024    MCV 95 07/16/2024     (L) 07/16/2024     Lab Results " "  Component Value Date    GLUCOSE 159 (H) 07/16/2024    CALCIUM 7.8 (L) 07/16/2024     07/16/2024    K 3.6 07/16/2024    CO2 24 07/16/2024     (H) 07/16/2024    BUN 52 (H) 07/16/2024    CREATININE 1.71 (H) 07/16/2024     No results found for: \"HGBA1C\", \"TSH\", \"VITD25\"  Cultures:   Susceptibility data from last 90 days.  Collected Specimen Info Organism Nitrofurantoin Oxacillin Trimethoprim/Sulfamethoxazole Vancomycin   07/13/24 Swab from Anterior Nares Methicillin Resistant Staphylococcus aureus (MRSA)       07/13/24 Urine from Indwelling (Pickett) Catheter Methicillin Resistant Staphylococcus aureus (MRSA)  S  R  S  S     Images:     CT chest wo IV contrast   Final Result   Tree-in-bud opacities throughout the lungs is suspicious for   bronchopneumonia.        Extensive coronary atherosclerosis is present but no evidence of   cardiomegaly, pulmonary edema, or pleural effusions.        Right upper lobe bronchiectasis and scarred consolidation are noted   which can be seen in the setting of prior right upper lobe radiation   treatment. If patient has prior lung malignancy treated with   radiation, then recommend comparing with prior oncologic exams to   ensure stability. If patient does not have prior radiation treatment,   then recommend pulmonology consultation and further characterization   with PET-CT.        Left adrenal gland nodule measuring 2.7 cm is indeterminate. If   outside imaging is available, recommend comparison. Otherwise,   recommend characterization with adrenal protocol CT.        Subtle nodular contour of the liver parenchyma raises suspicion for   underlying liver disease or early cirrhosis.             MACRO:   Critical Finding:  See findings. Notification was initiated on   7/13/2024 at 7:19 pm by  Cristiano Brown.  (**-YCF-**) Instructions:        Signed by: Cristiano Brown 7/13/2024 7:20 PM   Dictation workstation:   [id number]         Medications:     Scheduled:  [Held by provider] " aspirin, 81 mg, oral, Daily  dexAMETHasone, 6 mg, intravenous, q24h  heparin (porcine), 5,000 Units, subcutaneous, q8h  insulin lispro, 0-5 Units, subcutaneous, q6h  mupirocin, , Topical, BID  nystatin, 4 mL, Swish & Swallow, TID  oxygen, , inhalation, Continuous - Inhalation  piperacillin-tazobactam, 2.25 g, intravenous, q6h  remdesivir, 100 mg, intravenous, q24h  [Held by provider] rosuvastatin, 10 mg, oral, Daily  [Held by provider] senna, 10 mL, oral, BID  [Held by provider] tamsulosin, 0.4 mg, oral, Nightly  tiotropium, 2 puff, inhalation, Daily    Continuous:  dextrose 5%, 50 mL/hr, Last Rate: 50 mL/hr (07/16/24 1054)      PRN:  PRN medications: acetaminophen **OR** acetaminophen **OR** acetaminophen, albuterol, bisacodyl, dextrose, glucagon, moisturizing mouth, vancomycin     Assessment & Plan:   Delmar Casas is a 73 y.o. male with past medical history of malignant neoplasm of unspecified part of right bronchus, s/p CT and immunotherapy(1 month ago), sepsis, recurrent UTI, CKD stage IV, oropharyngeal dysphagia, asthma (no home oxygen) , BPH, hematuria, hypokalemia, CAD, anemia, neutropenia, HLD, early dementia, mood disturbance, anxiety admitted to the ICU with AHRF 2/2 PNA, COVID positive, severe hypernatremia and LUPILLO on CKD.     NEURO/PSYCH:  # Acute metabolic encephalopathy due to hypernatremia and PNA  # Early dementia  # Anxiety, mood disturbance  - Hold home hydroxyzine 5 mg and Ativan 0.5 mg PRN  - Monitor for ICU delirium   - Fall precautions in place, soft restraints for agitation  - Treating hypernatremia and pna as below     CARDIOVASCULAR:  # CAD  # HLD  # Prolonged Qtc (resolved)  - Holding aspirin 81mg   - Resume rosuvastatin 10mg via NGT    PULMONARY:  # AHRF 2/2 HAP   # COVID positive  # Hx of lung cancer  # Hx of asthma/COPD   - Pt presented with AHRF requiring 15 L HFNC, later placed on Airvo, currently on 3L  - Hx 120 pack year tobacco use   - CT chest bronchopneumonia, Right upper lobe  bronchiectasis and scarred consolidation are noted which can be seen in the setting of prior right upper lobe radiation treatment  - MRSA nares (+), high D-dimer 1,808, fibrinogen 510  - Vascular US lower extremity negative  - CT angio chest PE negative for PE   - S/p Azithro, continue Vanc + Zosyn    - Continue Remdesivir and Dex per COVID protocol   - On Spiriva Respimat daily and albuterol q6h PRN  - ICS, Acapella, chest PT   - Aspiration precautions  - NPO, SLP ordered but not alert enough to participate     RENAL/UROLOGY:  # Severe hypernatremia (Resolved)   # Hyperchloremia  - Pt presented with AMS, Na 165 on admission   - 2/2 dehydration and decreased po intake (not eating much d/t sore throat)   - Nephrology following, appreciate recs   - Discontinue D5     # LUPILLO on CKD (Stage 4)  # Hx of recurrent UTI   # Chronic Pickett  - Cr 3.63 on admission, baseline not clear as no previous records available  - Previously being treated for recurrent UTI  - UA significant for high LE and pyuria  - Urine culture with no growth   - Nephrology following, appreciate recs   - Has had Pickett for 2 months, was placed during last hospital stay in [place]. Exchanged 7/14, will attempt to remove and do voiding trial once more awake      # Left adrenal gland nodule  - CT chest showed left adrenal nodule measuring 2.7 centimeters  - Radiology recommended comparison-outside imaging available, otherwise characterization with adrenal protocol CT     # BPH  - Resume Tamsulosin 0.4mg via NGT     GASTROINTESTINAL:  # Oropharyngeal dysphagia  # Decreased p.o. intake   # Sore throat s/p CT and immunotherapy  # Liver nodule (possible cirrhosis/liver disease)  - NPO, SLP ordered but pt not alert enough to participate   - Aspiration precautions  - On bowel regimen for constipation  - Monitor liver nodule  - NG tube placed 7/16, start TF and FWF   - Start Protonix 40mg IV daily (NGT output appeared dark)     HEME/ONC:  # Malignant neoplasm of  unspecified  part of right bronchus  # S/P CT and Immunotherapy  # History of basal cell carcinoma on the nose  # Hx Anemia  - Had recent chemotherapy and immunotherapy about a month ago  - Developed sepsis followed by hospitalization and SNF after chemo  - High D-dimer 1,808, PT/INR normal  - LE DVT US negative   - CT angio chest negative for PE   - Holding PO iron     ENDOCRINE:  # Mild hyperglycemia   - 2/2 5% dextrose  - No history of DM  - On NPO SSI    INFECTIOUS:  # Sepsis 2/2 PNA vs UTI   - SIRS score 3, lactate normal 1.9  - 1L LR bolus, vancomycin and Zosyn in the ED  - Urine culture (7/13/24): MRSA   - Blood cultures (7/13/24): NGTD  - Continue Vanc + Zosyn      # PNA  # COVID positive  - See pulm above    MSK/SKIN:  # Left hand wound  - Wound care consulted    Fluid: FWF @ 150cc every 4 hrs   Electrolytes: Replete PRN  Nutrition: Jevity 1.5 @ 50cc/hr    GI ppx: PPI   DVT/PE ppx: Heparin   Abx: Vanc + Zosyn   Drips: None   Lines/Tubes/Drains: Left chest Mediport, left sided midline (from prior to admission), PIV, NGT, Pickett changed (7/14-)   Oxygen: 3L NC     Code: Full code     Dispo: Stable for transfer to any tele floor.     Yohana Jiménez, PGY-3  Internal Medicine

## 2024-07-16 NOTE — SIGNIFICANT EVENT
Family meeting scheduled for 7/17 at 1600 with pt's 2 sons.      07/16/24 at 4:27 PM - VIOLETA Goodman-CNP

## 2024-07-16 NOTE — PROGRESS NOTES
Speech-Language Pathology    Speech-Language Pathology Clinical Swallow Evaluation    Patient Name: Delmar Casas  MRN: [id number]  : 1950  Today's Date: 24  Start time: Start Time: 913  Stop time: Stop Time: 935  Time calculation (min) : Time Calculation (min): 22 min      ASSESSMENT  Impressions:  Severe oral phase and severe suspected pharyngeal phase dysphagia based on clinical swallow evaluation. Pt would benefit from skilled ST to minimize aspiration risk and ensure ongoing safety with the least restrictive diet.  Prognosis: Guarded    PLAN  Recommendations:  Re refer to speech therapy services when the patient is alert enough for participation in oral trials.     Recommended frequency/duration:  Skilled SLP services recommended: No    SUBJECTIVE    PMHx relevant to rehab: Delmar Casas is a 73 y.o. male with past medical history of malignant neoplasm of unspecified part of right bronchus, s/p CT and immunotherapy(1 month ago), sepsis, recurrent UTI, CKD stage IV, oropharyngeal dysphagia, asthma (no home oxygen) , BPH, hematuria, hypokalemia, CAD, anemia, neutropenia, HLD, early dementia, mood disturbance, anxiety admitted to the ICU with AHRF 2/2 PNA, COVID positive, severe hypernatremia and LUPILLO on CKD.        Chief complaint: Pt was admitted on 24 due to respiratory failure with hypoxia.     Relevant imaging results:  Chest x ray 24    General Visit Information:  SLP Received On: 24  Patient Class: Inpatient  Living Environment: Home  Ordering Physician: Dr Doty  Reason for Referral: determine readiness for oral intake  Prior to Session Communication: Bedside nurse    RN cleared pt to participate in session and reported that he has been very lethargic.     Pt's daughter in law was at the bedside. She feels she is able to get him awake so she wanted to be present for the evaluation. We scheduled to meet this morning to attempt the evaluation together.     [employer] Diet:  puree/ nectar  Current Diet : npo    Pain Assessment  Pain Assessment: PAINAD (Pain Assessment in Advanced Dementia Scale)  0-10 (Numeric) Pain Score: 0 - No pain  Barron-Baker FACES Pain Rating: No hurt    Pt was drowsy for session.  Ability to follow functional commands: Does not follow verbal commands    OBJECTIVE  Clinical swallow evaluation completed and consisted of interview and limited oral motor assessment.  The patient was unable to achieve and maintain alertness sufficient for oral trials. Alertness was 3-5 seconds in length after verbal and tactile cues provided by his daughter in law.     Was 3oz challenge administered: No, deferred due to safety concerns.      Treatment/Education:  Results and recommendations were relayed to: Patient, Family, and Bedside nurse  Education provided: Yes   Learner: Family   Barriers to learning: None   Method of teaching: Verbal   Topic: role of ST, results of assessment, and risk for aspiration   Outcome of teaching: Pt/family demonstrated good understanding  Treatment provided: No

## 2024-07-16 NOTE — PROGRESS NOTES
Occupational Therapy    Evaluation    Patient Name: Delmar Casas  MRN: [id number]  Today's Date: 7/16/2024       Assessment: Pt not able to participate  Plan: Multiple attempts made; orders discontinued with request for re-consult when pt is appropriate/able.       Subjective   Current Problem:  1. Respiratory failure with hypoxia, unspecified chronicity (Multi)        2. Sepsis, due to unspecified organism, unspecified whether acute organ dysfunction present (Multi)        3. Hypernatremia        4. Other acute pulmonary embolism without acute cor pulmonale (Multi)        5. DVT (deep vein thrombosis) in pregnancy ([facility])  Lower extremity venous duplex bilateral    Lower extremity venous duplex bilateral    CANCELED: Lower extremity venous duplex bilateral    CANCELED: Lower extremity venous duplex bilateral      6. Altered mental status, unspecified altered mental status type        7. Pneumonia due to infectious organism, unspecified laterality, unspecified part of lung        8. Lung mass        9. Lesion of adrenal gland (Multi)        10. Abnormal CT scan          General:  General  Reason for Referral: OT for ADL assessment  Missed Visit: Yes  Missed Visit Reason:  (Communicated via SecureChat with Nsg) pt continues to demonstrate intermittent command-following, and in soft safety restrains at this time. NG tube placement today  Prior to Session Communication: Bedside nurse  General Comment: OT re-attempt to complete assessment as ordered. Nursing reports pt is not appropriate for therapy assessment. Consult to be discontinued with request to reconsult when pt is appropriate.

## 2024-07-16 NOTE — PROGRESS NOTES
Delmar Casas is a 73 y.o. male on day 3 of admission presenting with Respiratory failure with hypoxia, unspecified chronicity (Multi).      Subjective   -Patient was seen in the ICU today-with precautions due to being on isolation  -Daughter in low/son are at bedside-discussed with them and give them updates  -Improving serum sodium and serum creatinine.  Family are not aware of history of CKD.  Continues to be on D5W 75 cc/h.  Good urine output.  They report he failed swallow eval this morning.  Not eating or drinking yet       Objective          Vitals 24HR  Heart Rate:  [45-75]   Temp:  [36 °C (96.8 °F)-36.4 °C (97.5 °F)]   Resp:  [14-30]   BP: ()/(48-97)   Weight:  [69.2 kg (152 lb 8.9 oz)]   SpO2:  [92 %-100 %]         Intake/Output last 3 Shifts:    Intake/Output Summary (Last 24 hours) at 7/16/2024 1052  Last data filed at 7/16/2024 1015  Gross per 24 hour   Intake 3148.75 ml   Output 1200 ml   Net 1948.75 ml       Physical Exam      Limited exam due to COVID-19 to limit exposure and transmission  General appearance: no distress.  Not awake.  Lying flat in bed  Eyes: non-icteric  Skin: no apparent rash  Neuro: No FND,asterixis, no focal deficits noticed  Pickett catheter in place with clear yellow urine     [Held by provider] aspirin, 81 mg, oral, Daily  dexAMETHasone, 6 mg, intravenous, q24h  heparin (porcine), 5,000 Units, subcutaneous, q8h  insulin lispro, 0-5 Units, subcutaneous, q6h  mupirocin, , Topical, BID  nystatin, 4 mL, Swish & Swallow, TID  oxygen, , inhalation, Continuous - Inhalation  piperacillin-tazobactam, 2.25 g, intravenous, q6h  remdesivir, 100 mg, intravenous, q24h  [Held by provider] rosuvastatin, 10 mg, oral, Daily  [Held by provider] senna, 10 mL, oral, BID  [Held by provider] tamsulosin, 0.4 mg, oral, Nightly  tiotropium, 2 puff, inhalation, Daily  vancomycin, 500 mg, intravenous, Once      RFP  Recent Labs     07/16/24  0510 07/15/24  1758 07/15/24  0538 07/14/24  1137  07/14/24  0514 07/14/24  0339 07/13/24  2326    148* 146* 151* 153* 152* 157*   K 3.6 3.6 3.5 3.8 4.1 4.0 4.1   * 113* 114* 117* 119* 119* 124*   CO2 24 24 23 23 23 23 24   BUN 52* 57* 68* 78* 83* 87* 93*   CREATININE 1.71* 1.95* 2.19* 2.92* 3.14* 3.41* 3.64*   GLUCOSE 159* 135* 148* 111* 162* 157* 172*   CALCIUM 7.8* 7.9* 7.8* 7.9* 7.6* 7.6* 8.0*   ALBUMIN 2.3* 2.3* 2.4* 2.4* 2.3* 2.3* 2.6*   PHOS 3.2 3.6 3.5 3.2 3.1 3.1 4.5  4.5   EGFR 41* 35* 31* 22* 20* 18* 17*   ANIONGAP 12 15 13 15 15 14 13        Urineanalysis  Recent Labs     07/13/24 2115   COLORU Light-Orange*   APPEARANCEU Ex.Turbid*   SPECGRAVU 1.018   JOLANTA 5.5   PROTUR 70 (1+)*   GLUCOSEU Normal   BLOODU 0.2 (2+)*   KETONESU NEGATIVE   BILIRUBINU NEGATIVE   NITRITEU NEGATIVE   LEUKOCYTESU 500 Sly/µL*       Urine Electrolytes  Recent Labs     07/13/24 2115 07/13/24 2105   SODIUMURR  --  39   NACREATUR  --  28   KUR  --  59   KCREATUR  --  43   CREATU  --  137.1   PROTUR 70 (1+)*  --         Urine Micro  Recent Labs     07/13/24 2115   WBCU >50*   RBCU >20*   HYALCASTU 3+*   SQUAMEPIU 1-9 (SPARSE)   BACTERIAU 1+*   MUCUSU 1+        Iron  Recent Labs     07/14/24 0514   FERRITIN 728*       @Mg@    Lab Results   Component Value Date    WBC 8.0 07/16/2024    HGB 9.0 (L) 07/16/2024    HCT 29.3 (L) 07/16/2024    MCV 95 07/16/2024     (L) 07/16/2024           Assessment/Plan     Delmar Casas is a 73 y.o. male with past medical history of malignant neoplasm of unspecified  part of right bronchus, s/p CT and immunotherapy(1 month ago), sepsis, recurrent UTI, CKD stage IV, oropharyngeal dysphagia, asthma (no home oxygen) , BPH, hematuria, hypokalemia, CAD, anemia, neutropenia, HLD, early dementia, mood disturbance, anxiety who presented to the ED for shortness of breath, gurgling in the throat and altered mental status.  Nephrology was consulted to manage acute kidney injury/severe hypernatremia      Principal Problem:    Respiratory  failure with hypoxia, unspecified chronicity (Multi)  Active Problems:    Hypernatremia    Lung cancer (Multi)    COVID    Impression  # Acute kidney injury/CKD-with history of decreased p.o. intake in the nursing facility.  Possible prerenal.  Improving with fluid resuscitation D5W.  Currently nonoliguric.  No clear baseline serum creatinine.  Family denies history of CKD.  Serum creatinine today is improving 2.1-->1.7    # Severe hypernatremia serum sodium 165 on presentation-most likely dehydration.-improving with resuscitation was IV D5W.  Today 146    # Altered mental status/early dementia    # COVID-19 positive    # Acute hypoxic respiratory failure/leukocytosis-currently on azithromycin/vancomycin/Zosyn    # Chronic Pickett catheter     Recommendation plan  -Continue D5W 75 cc/h until patient is able to receive p.o. fluid intake  -Continue Pickett catheter at this time.  Voiding trial when patient is more awake    Plan was discussed with ICU team/family at bedside      Kitty Tan MD

## 2024-07-17 PROBLEM — E87.0 HYPERNATREMIA: Status: RESOLVED | Noted: 2024-01-01 | Resolved: 2024-01-01

## 2024-07-17 LAB
ALBUMIN SERPL BCP-MCNC: 2.3 G/DL (ref 3.4–5)
ANION GAP SERPL CALC-SCNC: 13 MMOL/L (ref 10–20)
BUN SERPL-MCNC: 44 MG/DL (ref 6–23)
CALCIUM SERPL-MCNC: 7.7 MG/DL (ref 8.6–10.3)
CHLORIDE SERPL-SCNC: 113 MMOL/L (ref 98–107)
CO2 SERPL-SCNC: 23 MMOL/L (ref 21–32)
CREAT SERPL-MCNC: 1.65 MG/DL (ref 0.5–1.3)
EGFRCR SERPLBLD CKD-EPI 2021: 43 ML/MIN/1.73M*2
ERYTHROCYTE [DISTWIDTH] IN BLOOD BY AUTOMATED COUNT: 16.5 % (ref 11.5–14.5)
GLUCOSE BLD MANUAL STRIP-MCNC: 109 MG/DL (ref 74–99)
GLUCOSE BLD MANUAL STRIP-MCNC: 109 MG/DL (ref 74–99)
GLUCOSE BLD MANUAL STRIP-MCNC: 111 MG/DL (ref 74–99)
GLUCOSE BLD MANUAL STRIP-MCNC: 133 MG/DL (ref 74–99)
GLUCOSE SERPL-MCNC: 157 MG/DL (ref 74–99)
HCT VFR BLD AUTO: 32 % (ref 41–52)
HGB BLD-MCNC: 9.9 G/DL (ref 13.5–17.5)
MAGNESIUM SERPL-MCNC: 1.86 MG/DL (ref 1.6–2.4)
MCH RBC QN AUTO: 28.9 PG (ref 26–34)
MCHC RBC AUTO-ENTMCNC: 30.9 G/DL (ref 32–36)
MCV RBC AUTO: 93 FL (ref 80–100)
NRBC BLD-RTO: 0 /100 WBCS (ref 0–0)
PHOSPHATE SERPL-MCNC: 3 MG/DL (ref 2.5–4.9)
PLATELET # BLD AUTO: 165 X10*3/UL (ref 150–450)
POTASSIUM SERPL-SCNC: 3.5 MMOL/L (ref 3.5–5.3)
RBC # BLD AUTO: 3.43 X10*6/UL (ref 4.5–5.9)
SODIUM SERPL-SCNC: 145 MMOL/L (ref 136–145)
VANCOMYCIN SERPL-MCNC: 15 UG/ML (ref 5–20)
WBC # BLD AUTO: 8.2 X10*3/UL (ref 4.4–11.3)

## 2024-07-17 PROCEDURE — 99233 SBSQ HOSP IP/OBS HIGH 50: CPT | Performed by: INTERNAL MEDICINE

## 2024-07-17 PROCEDURE — 2500000004 HC RX 250 GENERAL PHARMACY W/ HCPCS (ALT 636 FOR OP/ED)

## 2024-07-17 PROCEDURE — 1200000002 HC GENERAL ROOM WITH TELEMETRY DAILY

## 2024-07-17 PROCEDURE — 94760 N-INVAS EAR/PLS OXIMETRY 1: CPT

## 2024-07-17 PROCEDURE — 2500000004 HC RX 250 GENERAL PHARMACY W/ HCPCS (ALT 636 FOR OP/ED): Mod: JZ

## 2024-07-17 PROCEDURE — 99223 1ST HOSP IP/OBS HIGH 75: CPT | Performed by: NURSE PRACTITIONER

## 2024-07-17 PROCEDURE — 2500000004 HC RX 250 GENERAL PHARMACY W/ HCPCS (ALT 636 FOR OP/ED): Performed by: INTERNAL MEDICINE

## 2024-07-17 PROCEDURE — 37799 UNLISTED PX VASCULAR SURGERY: CPT

## 2024-07-17 PROCEDURE — 2500000001 HC RX 250 WO HCPCS SELF ADMINISTERED DRUGS (ALT 637 FOR MEDICARE OP)

## 2024-07-17 PROCEDURE — 80202 ASSAY OF VANCOMYCIN: CPT | Performed by: INTERNAL MEDICINE

## 2024-07-17 PROCEDURE — 85027 COMPLETE CBC AUTOMATED: CPT

## 2024-07-17 PROCEDURE — 99233 SBSQ HOSP IP/OBS HIGH 50: CPT

## 2024-07-17 PROCEDURE — 99497 ADVNCD CARE PLAN 30 MIN: CPT | Performed by: NURSE PRACTITIONER

## 2024-07-17 PROCEDURE — C9113 INJ PANTOPRAZOLE SODIUM, VIA: HCPCS

## 2024-07-17 PROCEDURE — 82947 ASSAY GLUCOSE BLOOD QUANT: CPT

## 2024-07-17 PROCEDURE — 83735 ASSAY OF MAGNESIUM: CPT

## 2024-07-17 PROCEDURE — 2500000005 HC RX 250 GENERAL PHARMACY W/O HCPCS: Performed by: INTERNAL MEDICINE

## 2024-07-17 PROCEDURE — 80069 RENAL FUNCTION PANEL: CPT

## 2024-07-17 PROCEDURE — 94667 MNPJ CHEST WALL 1ST: CPT

## 2024-07-17 PROCEDURE — 2500000002 HC RX 250 W HCPCS SELF ADMINISTERED DRUGS (ALT 637 FOR MEDICARE OP, ALT 636 FOR OP/ED)

## 2024-07-17 RX ORDER — LANOLIN ALCOHOL/MO/W.PET/CERES
400 CREAM (GRAM) TOPICAL ONCE
Status: COMPLETED | OUTPATIENT
Start: 2024-07-17 | End: 2024-07-17

## 2024-07-17 RX ORDER — VANCOMYCIN HYDROCHLORIDE 750 MG/150ML
750 INJECTION, SOLUTION INTRAVENOUS EVERY 24 HOURS
Status: DISCONTINUED | OUTPATIENT
Start: 2024-07-17 | End: 2024-07-19 | Stop reason: DRUGHIGH

## 2024-07-17 RX ORDER — OLANZAPINE 10 MG/2ML
10 INJECTION, POWDER, FOR SOLUTION INTRAMUSCULAR ONCE
Status: COMPLETED | OUTPATIENT
Start: 2024-07-17 | End: 2024-07-17

## 2024-07-17 RX ORDER — POTASSIUM CHLORIDE 1.5 G/1.58G
20 POWDER, FOR SOLUTION ORAL ONCE
Status: COMPLETED | OUTPATIENT
Start: 2024-07-17 | End: 2024-07-17

## 2024-07-17 RX ADMIN — NYSTATIN 400000 UNITS: 100000 SUSPENSION ORAL at 09:00

## 2024-07-17 RX ADMIN — VANCOMYCIN HYDROCHLORIDE 750 MG: 750 INJECTION, SOLUTION INTRAVENOUS at 14:14

## 2024-07-17 RX ADMIN — PIPERACILLIN SODIUM AND TAZOBACTAM SODIUM 2.25 G: 2; .25 INJECTION, SOLUTION INTRAVENOUS at 12:44

## 2024-07-17 RX ADMIN — HEPARIN SODIUM 5000 UNITS: 5000 INJECTION INTRAVENOUS; SUBCUTANEOUS at 23:04

## 2024-07-17 RX ADMIN — Medication 15 ML: at 09:27

## 2024-07-17 RX ADMIN — PIPERACILLIN SODIUM AND TAZOBACTAM SODIUM 2.25 G: 2; .25 INJECTION, SOLUTION INTRAVENOUS at 18:45

## 2024-07-17 RX ADMIN — PANTOPRAZOLE SODIUM 40 MG: 40 INJECTION, POWDER, FOR SOLUTION INTRAVENOUS at 09:26

## 2024-07-17 RX ADMIN — NYSTATIN 400000 UNITS: 100000 SUSPENSION ORAL at 20:50

## 2024-07-17 RX ADMIN — ASPIRIN 81 MG: 81 TABLET, COATED ORAL at 09:26

## 2024-07-17 RX ADMIN — NYSTATIN 400000 UNITS: 100000 SUSPENSION ORAL at 15:00

## 2024-07-17 RX ADMIN — POTASSIUM CHLORIDE 20 MEQ: 1.5 POWDER, FOR SOLUTION ORAL at 09:26

## 2024-07-17 RX ADMIN — ROSUVASTATIN CALCIUM 10 MG: 10 TABLET, FILM COATED ORAL at 09:26

## 2024-07-17 RX ADMIN — DEXAMETHASONE SODIUM PHOSPHATE 6 MG: 10 INJECTION INTRAMUSCULAR; INTRAVENOUS at 22:00

## 2024-07-17 RX ADMIN — MUPIROCIN: 20 OINTMENT TOPICAL at 09:27

## 2024-07-17 RX ADMIN — MUPIROCIN: 20 OINTMENT TOPICAL at 20:49

## 2024-07-17 RX ADMIN — Medication 2 L/MIN: at 20:00

## 2024-07-17 RX ADMIN — REMDESIVIR 100 MG: 100 INJECTION, POWDER, LYOPHILIZED, FOR SOLUTION INTRAVENOUS at 02:03

## 2024-07-17 RX ADMIN — HEPARIN SODIUM 5000 UNITS: 5000 INJECTION INTRAVENOUS; SUBCUTANEOUS at 16:00

## 2024-07-17 RX ADMIN — DEXAMETHASONE SODIUM PHOSPHATE 6 MG: 10 INJECTION INTRAMUSCULAR; INTRAVENOUS at 00:03

## 2024-07-17 RX ADMIN — PIPERACILLIN SODIUM AND TAZOBACTAM SODIUM 2.25 G: 2; .25 INJECTION, SOLUTION INTRAVENOUS at 06:47

## 2024-07-17 RX ADMIN — Medication 2 L/MIN: at 08:00

## 2024-07-17 RX ADMIN — Medication 400 MG: at 09:26

## 2024-07-17 RX ADMIN — PIPERACILLIN SODIUM AND TAZOBACTAM SODIUM 2.25 G: 2; .25 INJECTION, SOLUTION INTRAVENOUS at 00:45

## 2024-07-17 RX ADMIN — OLANZAPINE 10 MG: 10 INJECTION, POWDER, FOR SOLUTION INTRAMUSCULAR at 21:51

## 2024-07-17 RX ADMIN — HEPARIN SODIUM 5000 UNITS: 5000 INJECTION INTRAVENOUS; SUBCUTANEOUS at 09:26

## 2024-07-17 RX ADMIN — HEPARIN SODIUM 5000 UNITS: 5000 INJECTION INTRAVENOUS; SUBCUTANEOUS at 00:03

## 2024-07-17 ASSESSMENT — PAIN SCALES - PAIN ASSESSMENT IN ADVANCED DEMENTIA (PAINAD)
BODYLANGUAGE: RELAXED
CONSOLABILITY: NO NEED TO CONSOLE
TOTALSCORE: 0
FACIALEXPRESSION: SMILING OR INEXPRESSIVE
BREATHING: NORMAL

## 2024-07-17 ASSESSMENT — COGNITIVE AND FUNCTIONAL STATUS - GENERAL
MOVING FROM LYING ON BACK TO SITTING ON SIDE OF FLAT BED WITH BEDRAILS: TOTAL
MOVING TO AND FROM BED TO CHAIR: TOTAL
WALKING IN HOSPITAL ROOM: TOTAL
CLIMB 3 TO 5 STEPS WITH RAILING: TOTAL
TURNING FROM BACK TO SIDE WHILE IN FLAT BAD: TOTAL
MOBILITY SCORE: 6
STANDING UP FROM CHAIR USING ARMS: TOTAL

## 2024-07-17 ASSESSMENT — PAIN SCALES - GENERAL
PAINLEVEL_OUTOF10: 0 - NO PAIN
PAINLEVEL_OUTOF10: 0 - NO PAIN

## 2024-07-17 ASSESSMENT — PAIN - FUNCTIONAL ASSESSMENT: PAIN_FUNCTIONAL_ASSESSMENT: CPOT (CRITICAL CARE PAIN OBSERVATION TOOL)

## 2024-07-17 ASSESSMENT — PAIN SCALES - WONG BAKER
WONGBAKER_NUMERICALRESPONSE: NO HURT
WONGBAKER_NUMERICALRESPONSE: NO HURT

## 2024-07-17 NOTE — PROGRESS NOTES
"Subjective   Subjective:   Overnight Events  No acute events overnight     Subjective  Patient seen and examined, A&O x1-2 to name but will not engage in conversation. Appears comfortable and in no distress.     Review Of Systems:  12-point ROS was performed and is negative except as noted below and in the HPI.      Objective   Objective:     /54   Pulse 82   Temp 36.2 °C (97.2 °F)   Resp 16   Ht 1.778 m (5' 10\")   Wt 67.8 kg (149 lb 7.6 oz)   SpO2 100%   BMI 21.45 kg/m²     Physical Exam  Constitutional:       General: He is not in acute distress.     Appearance: Normal appearance. He is ill-appearing.   Neck:      Vascular: No carotid bruit.   Cardiovascular:      Rate and Rhythm: Normal rate and regular rhythm.      Pulses: Normal pulses.      Heart sounds: Normal heart sounds. No murmur heard.     No friction rub. No gallop.   Pulmonary:      Effort: Pulmonary effort is normal. No respiratory distress.      Breath sounds: Normal breath sounds. No wheezing, rhonchi or rales.   Abdominal:      General: Abdomen is flat. Bowel sounds are normal. There is no distension.      Palpations: Abdomen is soft. There is no mass.      Tenderness: There is no abdominal tenderness. There is no guarding or rebound.   Musculoskeletal:         General: No swelling or tenderness. Normal range of motion.      Cervical back: Normal range of motion and neck supple. No rigidity or tenderness.   Skin:     General: Skin is warm and dry.      Coloration: Skin is not jaundiced.      Findings: No erythema or rash.   Neurological:      Motor: No weakness.      Comments: A&O x1-2, engages minimally    Psychiatric:         Mood and Affect: Mood normal.         Behavior: Behavior normal.       Lab Work:     Lab Results   Component Value Date    WBC 8.2 07/17/2024    HGB 9.9 (L) 07/17/2024    HCT 32.0 (L) 07/17/2024    MCV 93 07/17/2024     07/17/2024     Lab Results   Component Value Date    GLUCOSE 157 (H) 07/17/2024    " "CALCIUM 7.7 (L) 07/17/2024     07/17/2024    K 3.5 07/17/2024    CO2 23 07/17/2024     (H) 07/17/2024    BUN 44 (H) 07/17/2024    CREATININE 1.65 (H) 07/17/2024     No results found for: \"HGBA1C\", \"TSH\", \"VITD25\"  Cultures:   Susceptibility data from last 90 days.  Collected Specimen Info Organism Nitrofurantoin Oxacillin Trimethoprim/Sulfamethoxazole Vancomycin   07/13/24 Swab from Anterior Nares Methicillin Resistant Staphylococcus aureus (MRSA)       07/13/24 Urine from Indwelling (Pickett) Catheter Methicillin Resistant Staphylococcus aureus (MRSA)  S  R  S  S     Images:     CT chest wo IV contrast   Final Result   Tree-in-bud opacities throughout the lungs is suspicious for   bronchopneumonia.        Extensive coronary atherosclerosis is present but no evidence of   cardiomegaly, pulmonary edema, or pleural effusions.        Right upper lobe bronchiectasis and scarred consolidation are noted   which can be seen in the setting of prior right upper lobe radiation   treatment. If patient has prior lung malignancy treated with   radiation, then recommend comparing with prior oncologic exams to   ensure stability. If patient does not have prior radiation treatment,   then recommend pulmonology consultation and further characterization   with PET-CT.        Left adrenal gland nodule measuring 2.7 cm is indeterminate. If   outside imaging is available, recommend comparison. Otherwise,   recommend characterization with adrenal protocol CT.        Subtle nodular contour of the liver parenchyma raises suspicion for   underlying liver disease or early cirrhosis.             MACRO:   Critical Finding:  See findings. Notification was initiated on   7/13/2024 at 7:19 pm by  Cristiano Brown.  (**-YCF-**) Instructions:        Signed by: Cristiano Brown 7/13/2024 7:20 PM   Dictation workstation:   [id number]         Medications:     Scheduled:  aspirin, 81 mg, oral, Daily  dexAMETHasone, 6 mg, intravenous, " q24h  heparin (porcine), 5,000 Units, subcutaneous, q8h  insulin lispro, 0-5 Units, subcutaneous, q6h  mupirocin, , Topical, BID  nystatin, 4 mL, Swish & Swallow, TID  oxygen, , inhalation, Continuous - Inhalation  pantoprazole, 40 mg, intravenous, Daily  piperacillin-tazobactam, 2.25 g, intravenous, q6h  remdesivir, 100 mg, intravenous, q24h  rosuvastatin, 10 mg, oral, Daily  [Held by provider] senna, 10 mL, oral, BID  [Held by provider] tamsulosin, 0.4 mg, oral, Nightly  tiotropium, 2 puff, inhalation, Daily  vancomycin, 750 mg, intravenous, q24h    Continuous:       PRN:  PRN medications: acetaminophen **OR** acetaminophen **OR** acetaminophen, albuterol, bisacodyl, dextrose, glucagon, moisturizing mouth, vancomycin     Assessment & Plan:   Delmar Casas is a 73 y.o. male with past medical history of malignant neoplasm of unspecified part of right bronchus, s/p CT and immunotherapy(1 month ago), sepsis, recurrent UTI, CKD stage IV, oropharyngeal dysphagia, asthma (no home oxygen) , BPH, hematuria, hypokalemia, CAD, anemia, neutropenia, HLD, early dementia, mood disturbance, anxiety admitted to the ICU with AHRF 2/2 PNA, COVID positive, severe hypernatremia and LUPILLO on CKD.     NEURO/PSYCH:  # Acute metabolic encephalopathy 2/2 PNA  # Early dementia  # Anxiety, mood disturbance  - Hold home hydroxyzine 5 mg and Ativan 0.5 mg PRN  - Monitor for ICU delirium   - Fall precautions in place, soft restraints for agitation  - Treating hypernatremia and pna as below     CARDIOVASCULAR:  # CAD  # HLD  # Prolonged Qtc (resolved)  - Continue aspirin 81mg   - Continue rosuvastatin 10mg    PULMONARY:  # AHRF 2/2 HAP   # COVID positive  # Hx of lung cancer  # Hx of asthma/COPD   - Pt presented with AHRF requiring 15 L HFNC, later placed on Airvo, currently on 3L  - Hx 120 pack year tobacco use   - CT chest bronchopneumonia, Right upper lobe bronchiectasis and scarred consolidation are noted which can be seen in the setting of  prior right upper lobe radiation treatment  - MRSA nares (+), high D-dimer 1,808, fibrinogen 510  - Vascular US lower extremity negative  - CT angio chest PE negative for PE   - S/p Azithro, continue Vanc + Zosyn    - Continue Remdesivir and Dex per COVID protocol   - On Spiriva Respimat daily and albuterol q6h PRN  - ICS, Acapella, chest PT   - Aspiration precautions  - NPO, SLP ordered but not alert enough to participate     RENAL/UROLOGY:  # Severe hypernatremia (Resolved)   # Hyperchloremia  - Pt presented with AMS, Na 165 on admission   - 2/2 dehydration and decreased po intake (not eating much d/t sore throat)   - Nephrology following, appreciate recs   - Increase FWF to ensure 1.5L free water via NGT daily      # LUPILLO on CKD Stage IV  # Hx of recurrent UTI   # Chronic Pikcett  - Cr 3.63 on admission, baseline not clear as no previous records available  - Previously being treated for recurrent UTI  - UA significant for high LE and pyuria  - Urine culture (7/13/24): MRSA   - Nephrology following, appreciate recs   - Has had Pickett for 2 months, was placed during last hospital stay in [place]. Exchanged 7/14, will attempt to remove and do voiding trial today 7/17      # Left adrenal gland nodule  - CT chest showed left adrenal nodule measuring 2.7 centimeters  - Radiology recommended comparison-outside imaging available, otherwise characterization with adrenal protocol CT     # BPH  - Holding Tamsulosin 0.4mg (cannot get via NGT)    GASTROINTESTINAL:  # Oropharyngeal dysphagia  # Decreased p.o. intake   # Sore throat s/p CT and immunotherapy  # Liver nodule (possible cirrhosis/liver disease)  - NPO, SLP ordered but pt not alert enough to participate   - Aspiration precautions  - On bowel regimen for constipation  - Monitor liver nodule  - NG tube placed 7/16, continue TF and FWF   - Continue Protonix 40mg IV daily      HEME/ONC:  # Malignant neoplasm of unspecified part of right bronchus  # S/P CT and  Immunotherapy  # History of basal cell carcinoma on the nose  # Hx Anemia  - Had recent chemotherapy and immunotherapy about a month ago  - Developed sepsis followed by hospitalization and SNF after chemo  - High D-dimer 1,808, PT/INR normal  - LE DVT US negative   - CT angio chest negative for PE   - Holding PO iron     ENDOCRINE:  # Mild hyperglycemia   - 2/2 5% dextrose  - No history of DM  - On NPO SSI    INFECTIOUS:  # Sepsis 2/2 PNA vs UTI   - SIRS score 3, lactate normal 1.9  - 1L LR bolus, vancomycin and Zosyn in the ED  - Urine culture (7/13/24): MRSA   - Blood cultures (7/13/24): NGTD  - Continue Vanc + Zosyn      # PNA  # COVID positive  - See pulm above  - Continue iso for 10 days after positive test per policy     MSK/SKIN:  # Left hand wound  - Wound care consulted    Fluid: FWF @ 300 every 4 hrs   Electrolytes: Replete PRN  Nutrition: Jevity 1.5 @ 50cc/hr    GI ppx: PPI   DVT/PE ppx: Heparin   Abx: Vanc + Zosyn   Drips: None   Lines/Tubes/Drains: Left chest Mediport, left sided midline (from prior to admission), PIV, NGT, Pickett changed (7/14-)   Oxygen: 3L NC     Code: Full code     Dispo: Stable for transfer to any tele floor, family meeting with palliative at 4pm today.     Yohana Jiménez, PGY-3  Internal Medicine

## 2024-07-17 NOTE — CARE PLAN
The patient's goals for the shift include      The clinical goals for the shift include Patient will remain HDS trhroughout shift. Patient will tolerated tube feed throughout shift.

## 2024-07-17 NOTE — PROGRESS NOTES
Vancomycin Dosing by Pharmacy- FOLLOW UP    Delmar Casas is a 73 y.o. year old male who Pharmacy has been consulted for vancomycin dosing for pneumonia. Based on the patient's indication and renal status this patient is being dosed based on a goal AUC of 400-600.     Renal function is currently stable./improving     Current vancomycin dose: dose by level     Most recent random level: 15 mcg/mL    Visit Vitals  /54   Pulse 82   Temp 36.2 °C (97.2 °F)   Resp 16        Lab Results   Component Value Date    CREATININE 1.65 (H) 2024    CREATININE 1.71 (H) 2024    CREATININE 1.95 (H) 07/15/2024    CREATININE 2.19 (H) 07/15/2024        Patient weight is as follows:   Vitals:    24 0600   Weight: 67.8 kg (149 lb 7.6 oz)       Cultures:  Susceptibility data for the encounter in last 14 days.  Collected Specimen Info Organism Nitrofurantoin Oxacillin Trimethoprim/Sulfamethoxazole Vancomycin   24 Swab from Anterior Nares Methicillin Resistant Staphylococcus aureus (MRSA)       24 Urine from Indwelling (Pickett) Catheter Methicillin Resistant Staphylococcus aureus (MRSA)  S  R  S  S        I/O last 3 completed shifts:  In: 3320.3 (49 mL/kg) [I.V.:1729.3 (25.5 mL/kg); NG/GT:591; IV Piggyback:1000]  Out: 1100 (16.2 mL/kg) [Urine:1100 (0.5 mL/kg/hr)]  Weight: 67.8 kg   I/O during current shift:  I/O this shift:  In: 100 [IV Piggyback:100]  Out: -     Temp (24hrs), Av.6 °C (97.8 °F), Min:36.2 °C (97.2 °F), Max:36.9 °C (98.4 °F)      Assessment/Plan    Below goal AUC. Orders placed for new vancomcyin regimen of 750 every 24 hours to begin at 1100.     This dosing regimen is predicted by [employer] to result in the following pharmacokinetic parameters:  Regimen: 750 mg IV every 24 hours.  Start time: 23:26 on 2024  Exposure target: AUC24 (range)400-600 mg/L.hr   AUC24,ss: 480 mg/L.hr  Probability of AUC24 > 400: 92 %  Ctrough,ss: 15.7 mg/L  Probability of Ctrough,ss > 20: 9 %  Probability  of nephrotoxicity (Lodise BELTRAN 2009): 11 %      The next level will be obtained on  7 /18 at 0500. May be obtained sooner if clinically indicated.   Will continue to monitor renal function daily while on vancomycin and order serum creatinine at least every 48 hours if not already ordered.  Follow for continued vancomycin needs, clinical response, and signs/symptoms of toxicity.       Cristal Fairbanks, [facility]

## 2024-07-17 NOTE — CONSULTS
PALLIATIVE MEDICINE CONSULT   Attending Physician: Osei Doty DO  Reason For Consult: Assistance with determining goals of care, complex medical decision making, code status discussion, and symptom management if appropriate.     INTRODUCTIONS   Patient's capacity: None and is unable to participate in the visit  Family present:  Sons x 2  , DIL, family friend and his wife who is a nurse.  Service: Palliative care was introduced as a resource for patients with serious illness to help with symptoms, assist with goals of care conversations, navigate complex decision making, improve quality of life for patients, and provide support to patients and families.     SUBJECTIVE   History of the Present Illness  Delmar Casas is a 73 y.o. male who has a past medical history of malignant neoplasm of unspecified  part of right bronchus, s/p CT and immunotherapy(1 month ago), sepsis, recurrent UTI, CKD stage IV, CVA,  oropharyngeal dysphagia, asthma (no home oxygen) , BPH, hematuria, hypokalemia, CAD, anemia, neutropenia, HLD, early dementia, mood disturbance, anxiety who presented to the ED for shortness of breath, gurgling in the throat and altered mental status.     Per the record and confirmed with the family: According to family patient was living in [place] for the last 15 years.  He was diagnosed with lung cancer 1 year ago. He was recently started chemotherapy & immunotherapy a month ago, next night of having CT, he became unresponsive at home with bloody urine, extreme weakness, 911 was called,  he was taken to the ER diagnosed with sepsis, stayed in the hospital for 6 days then transferred to SNF.  In the SNF he was not eating and drinking much, due to sore throat, he was also having recurrent UTI during hospitalization as well as in the SNF.  SNF called his family regarding his gradual worsening health condition, family drove to [place] and brought him back to [place] and admitted in [facility] a couple days  prior to admission.  Driving was 19 hours, he did not eat anything but drink a little (Liquid IV) mixture on the way, but sleepy most of the time, responding only by opening eyes.  After coming in [place] he was admitted in [facility]. On 7/13 evening, he had about 5 PM, family members noticed he was making gurgling sound in the throat, coughing badly but nothing was coming up, was minimally responding, family called 911, squad found him saturating 66% and placed on a nonrebreather at 15 L/min, she was tearful in the ED. patient also had a history of lung cancer in different spot 15 years ago but family could not mention clearly regarding that.  He was also treated for possible basal cell carcinoma in right dorsal aspect of the nose with a nonhealing opening there.      The imaging is remarkable for PNA, mass like opacity in RUL malignancy, extensive CAD, lung scarring, concern for cirrhosis, old infarct, chronic microvascular ischemia.     Labs are remarkable for COVID,  significant hypernatremia/, LUPILLO on CKD, dehydration, elevated cardiac enzymes, elevated WBC count, and hyperglycemia. Pt has been treated with  IV fluids zithro, zosyn, , vanc, and heparin drip and artificial nutrition via NGT and their condition has  remained stable, however, is poor  since admission. Palliative Medicine was consulted to determine goals of care, assist with complex medical decision making, and symptom management if appropriate.     The history/collateral history was obtained from the Sons x 2 , DIL, family friend and his wife who is a nurse and record review due to pt is incapacitated.    Review of Systems/Symptoms:     Other 10pt review of systems negative/unobtainable unless otherwise mentioned in the HPI    Past Medical History  See HPI    Past Surgical History   has no past surgical history on file.    Family Medical History  No family history on file.    Home Medications  Reviewed    Allergies  No Known Allergies    Social  History  The patient is . The pt has  2 kids.  Was living in [place] until a couple days before admission. The pt is a current smoker until being hospitalized, and there is no alcohol or drug use hx.      Synagogue and Spirituality:  [facility] [employer]  Requesting spiritual support     ADVANCE CARE PLANNING AND COUNSELING   Advance Care Planning  conversation took place with:  Sons x2 and DIL    Counseling and Support Provided  Counseling provided on clinical condition, including diagnoses Respiratory Failure, Cerebrovascular Ischemic Disease ,  Advanced Dementia,  malignancy in the lung,now w COVID, poor prognosis, and care plan options from continued aggressive/survival based care, combination, or comfort care.  Education also provided on recommendation for someone with terminal illness, especially dementia not to have artificial nutrition and hydration given the high risk of more harm than good.   Reviewed advanced directive concepts  Education on hospice services   Resuscitation options with respect to QOL, disease stage, and expected outcomes.  Family verbalized understanding of this information.    All questions were answered to their satisfaction.  Support and empathy was provided throughout the conversation.    Serious Illness Perception  Serious Illness: Respiratory Failure, Cerebrovascular Ischemic Disease , Multiorgan Failure, Advanced Dementia, and malignancy of the lung  Impression of disease and prognosis: severe and struggling with accepting the possibility even, that the pt will not get much better with continued medical care  Concerns:  The family is concerned that this health decline happened so fast  Hopes: That pt will get better   Minimal acceptable quality of Life: Needs time to think about this, but they know at least that its better than this  Maximum health burden for the possibility of more time:At least the current care, but need time to think about this  Health preferences and  "priorities at disease progression/terminal phase: comfort  Rating of family knowledge about pt's disease, priorities and wishes: Good; They state that the pt would not want to live in this condition if he were not going to get better.     Resuscitation Assessment   Family does not feel that the pt would want resuscitation, but they are not ready at this time to  make a decision today about this.    Advanced Directive Documents/ Proxy  No Documents  Sons x2 making decisions together with assistance from friends     Emergency Contact Information  Extended Emergency Contact Information  Primary Emergency Contact: Bird Casas (POA)  Mobile Phone: [phone]  Relation: Son   needed? No  Secondary Emergency Contact: Trell Casas  Mobile Phone: [phone]  Relation: Son    OBJECTIVE   Inpatient Medications  aspirin, 81 mg, oral, Daily  dexAMETHasone, 6 mg, intravenous, q24h  heparin (porcine), 5,000 Units, subcutaneous, q8h  insulin lispro, 0-5 Units, subcutaneous, q6h  mupirocin, , Topical, BID  nystatin, 4 mL, Swish & Swallow, TID  oxygen, , inhalation, Continuous - Inhalation  pantoprazole, 40 mg, intravenous, Daily  piperacillin-tazobactam, 2.25 g, intravenous, q6h  remdesivir, 100 mg, intravenous, q24h  rosuvastatin, 10 mg, oral, Daily  [Held by provider] senna, 10 mL, oral, BID  [Held by provider] tamsulosin, 0.4 mg, oral, Nightly  tiotropium, 2 puff, inhalation, Daily  vancomycin, 750 mg, intravenous, q24h      Continuous medications     PRN medications  PRN medications: acetaminophen **OR** acetaminophen **OR** acetaminophen, albuterol, bisacodyl, dextrose, glucagon, moisturizing mouth, vancomycin     Last Recorded Vitals  Blood pressure 104/54, pulse 82, temperature 36.2 °C (97.2 °F), resp. rate 16, height 1.778 m (5' 10\"), weight 67.8 kg (149 lb 7.6 oz), SpO2 100%.  7 Day Weight Change: Unable to Calculate   Body mass index is 21.45 kg/m².   Weight change: 2.5 kg (5 lb 8.2 oz)     Relevant " Results  Lab Results   Component Value Date    WBC 8.2 07/17/2024    HGB 9.9 (L) 07/17/2024    HCT 32.0 (L) 07/17/2024    MCV 93 07/17/2024     07/17/2024      Lab Results   Component Value Date    GLUCOSE 157 (H) 07/17/2024    CALCIUM 7.7 (L) 07/17/2024     07/17/2024    K 3.5 07/17/2024    CO2 23 07/17/2024     (H) 07/17/2024    BUN 44 (H) 07/17/2024    CREATININE 1.65 (H) 07/17/2024      Lab Results   Component Value Date    ALT 32 07/14/2024    AST 38 07/14/2024    ALKPHOS 101 07/14/2024    BILITOT 0.9 07/14/2024      Lab Results   Component Value Date    ALBUMIN 2.3 (L) 07/17/2024      Serum creatinine: 1.65 mg/dL (H) 07/17/24 0533  Estimated creatinine clearance: 37.1 mL/min (A)     Relevant Imaging  ECG 12 Lead    Result Date: 7/16/2024  Sinus tachycardia with Premature supraventricular complexes ST & T wave abnormality, consider inferior ischemia Abnormal ECG No previous ECGs available See ED provider note for full interpretation and clinical correlation Confirmed by Rica Yousif ([id number]) on 7/16/2024 1:41:10 PM    XR chest 1 view    Result Date: 7/16/2024  Interpreted By:  Malou Jaquez, STUDY: XR CHEST 1 VIEW;  7/16/2024 11:41 am   INDICATION: Signs/Symptoms:NG placement.   COMPARISON: None.   ACCESSION NUMBER(S): [id number]   ORDERING CLINICIAN: STEFAN DURANT   TECHNIQUE: 2 AP portable images of the chest were obtained.   FINDINGS: The heart is normal in size.   There is no obvious consolidation or pleural fluid.   A port is present on the left with the tip in the region of the superior vena cava.   There are atherosclerotic changes of the aorta.   A nasogastric tube terminates in the region of the stomach.   There are degenerative changes of the spine   COMPARISON OF FINDING:       Nasogastric tube with the tip in the region of the stomach.   No acute cardiopulmonary disease.   MACRO: none   Signed by: Malou Jaquez 7/16/2024 1:06 PM Dictation workstation:   [id number]    ECG 12  lead    Result Date: 7/15/2024  Accelerated Junctional rhythm Low voltage QRS Nonspecific T wave abnormality Abnormal ECG When compared with ECG of 13-JUL-2024 18:20, (unconfirmed) Junctional rhythm has replaced Sinus rhythm Vent. rate has decreased BY  44 BPM Non-specific change in ST segment in Inferior leads ST no longer depressed in Lateral leads Nonspecific T wave abnormality has replaced inverted T waves in Inferior leads    CT angio chest for pulmonary embolism    Result Date: 7/14/2024  Interpreted By:  Lux Palacio, STUDY: CT ANGIO CHEST FOR PULMONARY EMBOLISM;  7/14/2024 2:39 am   INDICATION: Signs/Symptoms:to exclude PE.   COMPARISON: 7/13/2024   ACCESSION NUMBER(S): [id number]   ORDERING CLINICIAN: POOJA DARBY   TECHNIQUE: Contiguous axial images of the chest were obtained after the intravenous administration of  contrast. Coronal and sagittal reformatted images were obtained from the axial images. MIPS of the chest were also performed and reviewed.   FINDINGS: The examination is limited secondary to motion and beam hardening artifact secondary to inferior position of the patient's arms.   No axillary lymphadenopathy. 1.6 cm subcarinal lymph node. The heart is normal in size. Coronary artery atherosclerotic calcifications. No significant pericardial effusion. No evidence of acute central, main, lobar or proximal segmental pulmonary embolism.   Evaluation of the lungs is limited secondary to respiratory motion. There is redemonstration of masslike opacity in the medial right lung abutting the mediastinum which measures 4 cm x 1.5 cm in greatest axial dimensions. There nodular in size opacities posterior and superior to this opacity. There are mild consolidative opacity in the right lower lobe and in the left upper lobe and lingula compatible with pneumonia. Small nodular opacities also again noted.   Limited evaluation of the upper abdomen.   Multilevel degenerative change of the thoracic spine.        No evidence of acute pulmonary embolism.   Redemonstration of masslike opacity in the medial right upper lung abutting the mediastinum which measures 4 cm x 1.5 cm underlying malignancy is not excluded and comparison with outside prior imaging recommended and attention on short-term progress imaging.   Nodular opacities are again noted posterior and superior to the described masslike opacity. There is interval development of mild consolidative opacities in the right lower lobe and also in the left upper lobe and lingula compatible with pneumonia. There is also minimal opacity in the medial left lower lobe. Small nodular opacities are also again noted. Short-term progress imaging recommended for further evaluation.   MACRO: None   Signed by: Lux Palacio 7/14/2024 3:33 AM Dictation workstation:   [id number]    Lower extremity venous duplex bilateral    Result Date: 7/14/2024  Interpreted By:  Lux Palacio, STUDY: [facility] US LOWER EXTREMITY VENOUS DUPLEX BILATERAL;  7/14/2024 1:32 am   INDICATION: Signs/Symptoms:High D-dimer, history of lung cancer with CT, COVID-positive, on Airvo.   COMPARISON: None.   ACCESSION NUMBER(S): [id number]   ORDERING CLINICIAN: POOJA DARBY   TECHNIQUE: Vascular ultrasound of the bilateral lower extremities was performed. Real-time compression views as well as Gray scale, color Doppler and spectral Doppler waveform analysis was performed.   FINDINGS: Evaluation of the visualized portions of the bilateral common femoral vein, proximal, mid, and distal femoral vein, and popliteal vein were performed.  Evaluation of the visualized portions of the  posterior tibial and peroneal veins were also performed.   The evaluated veins demonstrate normal compressibility. There is intact venous flow demonstrating normal respiratory variability and normal augmentation of flow with calf compression. Therefore, there is no ultrasonographic evidence for deep vein thrombosis within the evaluated  veins.       No sonographic evidence for deep vein thrombosis within the evaluated veins of the bilateral lower extremity.   MACRO: None   Signed by: Lux Palacio 7/14/2024 1:33 AM Dictation workstation:   [id number]    CT head wo IV contrast    Result Date: 7/13/2024  Interpreted By:  Segun Ford, STUDY: CT HEAD WO IV CONTRAST;  7/13/2024 8:22 pm   INDICATION: Signs/Symptoms:AMS.   COMPARISON: None.   ACCESSION NUMBER(S): [id number]   ORDERING CLINICIAN: RYAN CALVILLO   TECHNIQUE: Noncontrast axial CT scan of head was performed.   FINDINGS: Parenchyma: There is no intracranial hemorrhage. The grey-white differentiation is intact. There is no mass effect or midline shift. Encephalomalacia left occipital lobe from prior infarct. Superimposed confluent supratentorial hypodensity, nonspecific, but likely secondary to chronic microvascular ischemia.   CSF Spaces: Mild generalized volume loss with concordant ventriculomegaly.   Extra-Axial Fluid: There is no extraaxial fluid collection.   Calvarium: The calvarium is unremarkable.   Paranasal sinuses: Moderate to severe diffuse paranasal sinus mucosal thickening.   Mastoids: Clear.   Orbits: Bilateral lens replacements   Soft tissues: Unremarkable.       No acute intracranial hemorrhage, mass effect, or CT apparent acute infarct. Chronic microvascular ischemia, sequela of prior left occipital lobe infarct and involutional changes.   Signed by: Segun Ford 7/13/2024 8:57 PM Dictation workstation:   [id number]    CT chest wo IV contrast    Result Date: 7/13/2024  Interpreted By:  Cristiano Brown, STUDY: CT CHEST WO IV CONTRAST;  7/13/2024 6:39 pm   INDICATION: Signs/Symptoms:Coarse breath sounds heard bilateral possible CHF versus pneumonia.   COMPARISON: None.   ACCESSION NUMBER(S): [id number]   ORDERING CLINICIAN: RYAN CALVILLO   TECHNIQUE: Axial CT images of the chest obtained without contrast.   FINDINGS: BONES: No acute osseous abnormality. Diffuse  osseous demineralization. Chronic healed rib fracture deformities. LOWER NECK: Unremarkable. CHEST WALL: Left chest port with catheter tip in the mid SVC. UPPER ABDOMEN: Left adrenal gland nodule measuring 2.5 x 2.7 cm with internal Hounsfield unit of 24, indeterminate atrophic kidneys with vascular calcifications, likely chronic medical renal disease. Mildly distended gallbladder but no surrounding inflammation. Questionable nodular contour of the liver parenchyma raising suspicion for underlying liver disease.   VESSELS: Calcification of the aortic arch and its branches. No aneurysm. HEART: Normal size. Severe coronary atherosclerosis. There is calcification of the mitral annulus, aortic annulus, and aortic valve. Metallic density within the left ventricle, difficult to characterize and localize in the absence of contrast. MEDIASTINUM AND JENELLE: No pathologically enlarged lymph nodes. LUNGS AND LARGE AIRWAYS: There is bronchiectasis, consolidation, and scarring in the right upper lobe with a linear border favoring prior right upper lobe radiation. Scattered tree-in-bud opacities throughout the bilateral lower lobes, left upper lobe, and lingula are present. PLEURA: No pleural effusion or pneumothorax.       Tree-in-bud opacities throughout the lungs is suspicious for bronchopneumonia.   Extensive coronary atherosclerosis is present but no evidence of cardiomegaly, pulmonary edema, or pleural effusions.   Right upper lobe bronchiectasis and scarred consolidation are noted which can be seen in the setting of prior right upper lobe radiation treatment. If patient has prior lung malignancy treated with radiation, then recommend comparing with prior oncologic exams to ensure stability. If patient does not have prior radiation treatment, then recommend pulmonology consultation and further characterization with PET-CT.   Left adrenal gland nodule measuring 2.7 cm is indeterminate. If outside imaging is available,  recommend comparison. Otherwise, recommend characterization with adrenal protocol CT.   Subtle nodular contour of the liver parenchyma raises suspicion for underlying liver disease or early cirrhosis.     MACRO: Critical Finding:  See findings. Notification was initiated on 7/13/2024 at 7:19 pm by  Cristiano Brown.  (**-YCF-**) Instructions:   Signed by: Cristiano Brown 7/13/2024 7:20 PM Dictation workstation:   [id number]       Physical Exam   GENERAL: lethargic, ill appearing, frail elderly, laying in bed, condition appears poor   SKIN: dry    HEENT:  NGT  LUNGS: Unlabored resps  CARDIO: no JVD appearance while laying flat   GI: non distended  : urine is yellow  NEURO: lethargic, minimally responsive  PSYCH: no restlessness    ASSESSMENT AND PLAN   Impression  This is a terminally ill 73 y.o. year old who is hospitalized for COVID Respiratory failure with hypoxia, unspecified chronicity (Multi). This has been complicated by  critical condition, dysphagia,  poor mental status, LUPILLO  . Patients overall condition  has stabilized with hospital care for the time being . Based on the history, the pt condition has been in a terminal decline since he was in [place], and hospital care is being used as a means for survival .     Goals of Care: Unsure; needs time to think about their options  Code Status: sustained at  Full code; family reconsidering  Prognosis: poor  Hospice Eligibility: Advanced Cancer, Respiratory Failure, Advanced Lung Disease , Cerebrovascular Ischemic Disease , and Advanced Dementia    Plan:  Continue supportive care  Family wanting pt to have a few more days with supportive care to have peace of mind with decisions they make  Revisit GOC sooner if needed  Hospice consultation w [facility]; Date and time TBD    Thank you for asking Palliative Care to assist with care of this patient.  Please contact us for additional questions or concerns.    Update provided to: The Primary team and the bedside  nurse    MEDICAL COMPLEXITY    Medical complexity was high level due to due to 1 or more illness with severe exacerbation /progression/ poses threat to life or bodily function,  record review of the prior external notes, current encounter notes, independent interpretation of laboratory tests noted in the HPI, discussion of management with the primary team.      CONTACT INFORMATION   Francie Bledsoe CNP  Palliative Medicine  [employer] chat

## 2024-07-17 NOTE — PROGRESS NOTES
Physical Therapy                 Therapy Communication Note    Patient Name: Delmar Casas  MRN: [id number]  Today's Date: 7/17/2024     Discipline: Physical Therapy    Missed Visit Reason: Cancel. Pt remains agitated with soft restraints in place. Goals of care meeting scheduled for 1400 today with pt and 2 sons. Attempted PT eval x3 consecutive days without success; will discontinue orders at this time.     Please re-consult PT/OT following palliative care consult when pt is appropriate for therapy and able to participate at a meaningful level.     Missed Time: Cancel

## 2024-07-17 NOTE — PROGRESS NOTES
07/17/24 1523   Discharge Planning   Living Arrangements Other (Comment)  ([facility])   Support Systems Children;Family members   Assistance Needed Patient was at [facility] in [place], [place] and was moved to [facility] where he was at for approximately 24 hours before coming to [facility]. Per son, up until his first chemo treatment in [place] he was walking without ambulation aides, A&O X3 and independent with ADLs. After his first chemo treatment he declined greatly. Per son patient was bedbound at [facility] in [place], [place], they were not getting him up to a WC. Patient is COVID positive and is currently requiring O2.   Type of Residence Private residence   Number of Stairs to Enter Residence 0   Number of Stairs Within Residence 0   Do you have animals or pets at home? No   Who is requesting discharge planning? Provider   Home or Post Acute Services Post acute facilities (Rehab/SNF/etc)   Type of Post Acute Facility Services Rehab;Skilled nursing   Expected Discharge Disposition SNF  (Palliative meeting scheduled with patient's sons 7/17 at 1600)   Does the patient need discharge transport arranged? Yes   RoundTrip coordination needed? Yes   Has discharge transport been arranged? No   Patient Choice   Provider Choice list and CMS website (https://medicare.gov/care-compare#search) for post-acute Quality and Resource Measure Data were provided and reviewed with: Family   Patient / Family choosing to utilize agency / facility established prior to hospitalization Yes

## 2024-07-17 NOTE — CARE PLAN
Updated Maria Eugenia at [facility] ([phone]) that pt is stable to move out of ICU and a palliative meeting is scheduled for today at 1600 with 2 sons.

## 2024-07-18 LAB
ALBUMIN SERPL BCP-MCNC: 2.3 G/DL (ref 3.4–5)
ANION GAP SERPL CALC-SCNC: 11 MMOL/L (ref 10–20)
BACTERIA BLD CULT: NORMAL
BACTERIA BLD CULT: NORMAL
BUN SERPL-MCNC: 35 MG/DL (ref 6–23)
CALCIUM SERPL-MCNC: 7.5 MG/DL (ref 8.6–10.3)
CHLORIDE SERPL-SCNC: 113 MMOL/L (ref 98–107)
CO2 SERPL-SCNC: 23 MMOL/L (ref 21–32)
CREAT SERPL-MCNC: 1.43 MG/DL (ref 0.5–1.3)
EGFRCR SERPLBLD CKD-EPI 2021: 51 ML/MIN/1.73M*2
ERYTHROCYTE [DISTWIDTH] IN BLOOD BY AUTOMATED COUNT: 17 % (ref 11.5–14.5)
GLUCOSE BLD MANUAL STRIP-MCNC: 108 MG/DL (ref 74–99)
GLUCOSE BLD MANUAL STRIP-MCNC: 131 MG/DL (ref 74–99)
GLUCOSE SERPL-MCNC: 176 MG/DL (ref 74–99)
HCT VFR BLD AUTO: 33.8 % (ref 41–52)
HGB BLD-MCNC: 10 G/DL (ref 13.5–17.5)
MAGNESIUM SERPL-MCNC: 1.77 MG/DL (ref 1.6–2.4)
MCH RBC QN AUTO: 28.7 PG (ref 26–34)
MCHC RBC AUTO-ENTMCNC: 29.6 G/DL (ref 32–36)
MCV RBC AUTO: 97 FL (ref 80–100)
NRBC BLD-RTO: 0 /100 WBCS (ref 0–0)
PHOSPHATE SERPL-MCNC: 2.3 MG/DL (ref 2.5–4.9)
PLATELET # BLD AUTO: 154 X10*3/UL (ref 150–450)
POTASSIUM SERPL-SCNC: 3.3 MMOL/L (ref 3.5–5.3)
RBC # BLD AUTO: 3.48 X10*6/UL (ref 4.5–5.9)
SODIUM SERPL-SCNC: 144 MMOL/L (ref 136–145)
VANCOMYCIN SERPL-MCNC: 16.9 UG/ML (ref 5–20)
WBC # BLD AUTO: 7.7 X10*3/UL (ref 4.4–11.3)

## 2024-07-18 PROCEDURE — 99233 SBSQ HOSP IP/OBS HIGH 50: CPT

## 2024-07-18 PROCEDURE — 2500000004 HC RX 250 GENERAL PHARMACY W/ HCPCS (ALT 636 FOR OP/ED): Performed by: INTERNAL MEDICINE

## 2024-07-18 PROCEDURE — 94760 N-INVAS EAR/PLS OXIMETRY 1: CPT

## 2024-07-18 PROCEDURE — 37799 UNLISTED PX VASCULAR SURGERY: CPT

## 2024-07-18 PROCEDURE — 2500000002 HC RX 250 W HCPCS SELF ADMINISTERED DRUGS (ALT 637 FOR MEDICARE OP, ALT 636 FOR OP/ED)

## 2024-07-18 PROCEDURE — 1200000002 HC GENERAL ROOM WITH TELEMETRY DAILY

## 2024-07-18 PROCEDURE — 80069 RENAL FUNCTION PANEL: CPT

## 2024-07-18 PROCEDURE — 94660 CPAP INITIATION&MGMT: CPT

## 2024-07-18 PROCEDURE — 2500000004 HC RX 250 GENERAL PHARMACY W/ HCPCS (ALT 636 FOR OP/ED)

## 2024-07-18 PROCEDURE — C9113 INJ PANTOPRAZOLE SODIUM, VIA: HCPCS

## 2024-07-18 PROCEDURE — 85027 COMPLETE CBC AUTOMATED: CPT

## 2024-07-18 PROCEDURE — 80202 ASSAY OF VANCOMYCIN: CPT | Performed by: INTERNAL MEDICINE

## 2024-07-18 PROCEDURE — 2500000005 HC RX 250 GENERAL PHARMACY W/O HCPCS

## 2024-07-18 PROCEDURE — 2500000001 HC RX 250 WO HCPCS SELF ADMINISTERED DRUGS (ALT 637 FOR MEDICARE OP)

## 2024-07-18 PROCEDURE — 99233 SBSQ HOSP IP/OBS HIGH 50: CPT | Performed by: INTERNAL MEDICINE

## 2024-07-18 PROCEDURE — 82947 ASSAY GLUCOSE BLOOD QUANT: CPT

## 2024-07-18 PROCEDURE — 2500000005 HC RX 250 GENERAL PHARMACY W/O HCPCS: Performed by: INTERNAL MEDICINE

## 2024-07-18 PROCEDURE — 83735 ASSAY OF MAGNESIUM: CPT

## 2024-07-18 RX ORDER — LANOLIN ALCOHOL/MO/W.PET/CERES
400 CREAM (GRAM) TOPICAL ONCE
Status: COMPLETED | OUTPATIENT
Start: 2024-07-18 | End: 2024-07-18

## 2024-07-18 RX ORDER — DEXAMETHASONE SODIUM PHOSPHATE 10 MG/ML
4 INJECTION INTRAMUSCULAR; INTRAVENOUS EVERY 24 HOURS
Status: DISCONTINUED | OUTPATIENT
Start: 2024-07-18 | End: 2024-07-18

## 2024-07-18 RX ORDER — POTASSIUM CHLORIDE 1.5 G/1.58G
40 POWDER, FOR SOLUTION ORAL ONCE
Status: COMPLETED | OUTPATIENT
Start: 2024-07-18 | End: 2024-07-18

## 2024-07-18 RX ORDER — DEXAMETHASONE SODIUM PHOSPHATE 10 MG/ML
4 INJECTION INTRAMUSCULAR; INTRAVENOUS ONCE
Status: COMPLETED | OUTPATIENT
Start: 2024-07-18 | End: 2024-07-18

## 2024-07-18 RX ADMIN — DEXAMETHASONE SODIUM PHOSPHATE 4 MG: 10 INJECTION INTRAMUSCULAR; INTRAVENOUS at 23:40

## 2024-07-18 RX ADMIN — PIPERACILLIN SODIUM AND TAZOBACTAM SODIUM 2.25 G: 2; .25 INJECTION, SOLUTION INTRAVENOUS at 18:02

## 2024-07-18 RX ADMIN — PIPERACILLIN SODIUM AND TAZOBACTAM SODIUM 2.25 G: 2; .25 INJECTION, SOLUTION INTRAVENOUS at 06:16

## 2024-07-18 RX ADMIN — HEPARIN SODIUM 5000 UNITS: 5000 INJECTION INTRAVENOUS; SUBCUTANEOUS at 09:59

## 2024-07-18 RX ADMIN — ASPIRIN 81 MG: 81 TABLET, COATED ORAL at 10:00

## 2024-07-18 RX ADMIN — PANTOPRAZOLE SODIUM 40 MG: 40 INJECTION, POWDER, FOR SOLUTION INTRAVENOUS at 09:58

## 2024-07-18 RX ADMIN — VANCOMYCIN HYDROCHLORIDE 750 MG: 750 INJECTION, SOLUTION INTRAVENOUS at 09:58

## 2024-07-18 RX ADMIN — Medication 2 L/MIN: at 08:00

## 2024-07-18 RX ADMIN — REMDESIVIR 100 MG: 100 INJECTION, POWDER, LYOPHILIZED, FOR SOLUTION INTRAVENOUS at 01:03

## 2024-07-18 RX ADMIN — PIPERACILLIN SODIUM AND TAZOBACTAM SODIUM 2.25 G: 2; .25 INJECTION, SOLUTION INTRAVENOUS at 23:40

## 2024-07-18 RX ADMIN — Medication 400 MG: at 10:04

## 2024-07-18 RX ADMIN — HEPARIN SODIUM 5000 UNITS: 5000 INJECTION INTRAVENOUS; SUBCUTANEOUS at 18:38

## 2024-07-18 RX ADMIN — POTASSIUM PHOSPHATE, MONOBASIC AND POTASSIUM PHOSPHATE, DIBASIC 15 MMOL: 224; 236 INJECTION, SOLUTION, CONCENTRATE INTRAVENOUS at 08:38

## 2024-07-18 RX ADMIN — PIPERACILLIN SODIUM AND TAZOBACTAM SODIUM 2.25 G: 2; .25 INJECTION, SOLUTION INTRAVENOUS at 00:24

## 2024-07-18 RX ADMIN — MUPIROCIN 1 APPLICATION: 20 OINTMENT TOPICAL at 20:08

## 2024-07-18 RX ADMIN — NYSTATIN 400000 UNITS: 100000 SUSPENSION ORAL at 20:40

## 2024-07-18 RX ADMIN — TIOTROPIUM BROMIDE INHALATION SPRAY 2 PUFF: 3.12 SPRAY, METERED RESPIRATORY (INHALATION) at 10:00

## 2024-07-18 RX ADMIN — Medication 15 ML: at 10:01

## 2024-07-18 RX ADMIN — ROSUVASTATIN CALCIUM 10 MG: 10 TABLET, FILM COATED ORAL at 10:00

## 2024-07-18 RX ADMIN — MUPIROCIN: 20 OINTMENT TOPICAL at 10:01

## 2024-07-18 RX ADMIN — POTASSIUM CHLORIDE 40 MEQ: 1.5 POWDER, FOR SOLUTION ORAL at 10:04

## 2024-07-18 RX ADMIN — INSULIN LISPRO 1 UNITS: 100 INJECTION, SOLUTION INTRAVENOUS; SUBCUTANEOUS at 05:30

## 2024-07-18 RX ADMIN — NYSTATIN 400000 UNITS: 100000 SUSPENSION ORAL at 09:58

## 2024-07-18 ASSESSMENT — PAIN SCALES - GENERAL
PAINLEVEL_OUTOF10: 0 - NO PAIN

## 2024-07-18 ASSESSMENT — COGNITIVE AND FUNCTIONAL STATUS - GENERAL
WALKING IN HOSPITAL ROOM: TOTAL
HELP NEEDED FOR BATHING: TOTAL
STANDING UP FROM CHAIR USING ARMS: TOTAL
PERSONAL GROOMING: TOTAL
DRESSING REGULAR UPPER BODY CLOTHING: TOTAL
TURNING FROM BACK TO SIDE WHILE IN FLAT BAD: TOTAL
MOBILITY SCORE: 7
TOILETING: TOTAL
DRESSING REGULAR LOWER BODY CLOTHING: TOTAL
CLIMB 3 TO 5 STEPS WITH RAILING: TOTAL
EATING MEALS: TOTAL
MOVING FROM LYING ON BACK TO SITTING ON SIDE OF FLAT BED WITH BEDRAILS: A LOT
MOVING TO AND FROM BED TO CHAIR: TOTAL
DAILY ACTIVITIY SCORE: 6

## 2024-07-18 ASSESSMENT — PAIN SCALES - PAIN ASSESSMENT IN ADVANCED DEMENTIA (PAINAD)
TOTALSCORE: 0
BREATHING: NORMAL
FACIALEXPRESSION: SMILING OR INEXPRESSIVE
BODYLANGUAGE: RELAXED
TOTALSCORE: REPOSITIONED
CONSOLABILITY: NO NEED TO CONSOLE
BODYLANGUAGE: RELAXED
TOTALSCORE: 0
CONSOLABILITY: NO NEED TO CONSOLE
BREATHING: NORMAL
FACIALEXPRESSION: SMILING OR INEXPRESSIVE

## 2024-07-18 ASSESSMENT — PAIN - FUNCTIONAL ASSESSMENT
PAIN_FUNCTIONAL_ASSESSMENT: 0-10
PAIN_FUNCTIONAL_ASSESSMENT: PAINAD (PAIN ASSESSMENT IN ADVANCED DEMENTIA SCALE)
PAIN_FUNCTIONAL_ASSESSMENT: 0-10

## 2024-07-18 ASSESSMENT — PAIN SCALES - WONG BAKER: WONGBAKER_NUMERICALRESPONSE: NO HURT

## 2024-07-18 NOTE — PROGRESS NOTES
Vancomycin Dosing by Pharmacy- FOLLOW UP    Delmar Casas is a 73 y.o. year old male who Pharmacy has been consulted for vancomycin dosing for pneumonia. Based on the patient's indication and renal status this patient is being dosed based on a goal AUC of 400-600.     Renal function is currently improving.    Current vancomycin dose: 750 mg given every 24 hours    Estimated vancomycin AUC on current dose: 448 mg/L.hr     Visit Vitals  /59   Pulse 71   Temp 36.3 °C (97.3 °F)   Resp 18        Lab Results   Component Value Date    CREATININE 1.43 (H) 2024    CREATININE 1.65 (H) 2024    CREATININE 1.71 (H) 2024    CREATININE 1.95 (H) 07/15/2024        Patient weight is as follows:   Vitals:    24 1500   Weight: 67.8 kg (149 lb 7.6 oz)       Cultures:  Susceptibility data for the encounter in last 14 days.  Collected Specimen Info Organism Nitrofurantoin Oxacillin Trimethoprim/Sulfamethoxazole Vancomycin   24 Swab from Anterior Nares Methicillin Resistant Staphylococcus aureus (MRSA)       24 Urine from Indwelling (Pickett) Catheter Methicillin Resistant Staphylococcus aureus (MRSA)  S  R  S  S        I/O last 3 completed shifts:  In: 1241 (18.3 mL/kg) [NG/GT:741; IV Piggyback:500]  Out: 1000 (14.7 mL/kg) [Urine:1000 (0.4 mL/kg/hr)]  Weight: 67.8 kg   I/O during current shift:  No intake/output data recorded.    Temp (24hrs), Av.4 °C (97.5 °F), Min:36.3 °C (97.3 °F), Max:36.5 °C (97.7 °F)      Assessment/Plan    Within goal AUC range. Continue current vancomycin regimen.    This dosing regimen is predicted by Insight[employer] to result in the following pharmacokinetic parameters:    Loading dose: N/A  Regimen: 750 mg IV every 24 hours.  Start time: 14:14 on 2024  Exposure target: AUC24 (range)400-600 mg/L.hr   AUC24,ss: 448 mg/L.hr  Probability of AUC24 > 400: 83 %  Ctrough,ss: 14.5 mg/L  Probability of Ctrough,ss > 20: 3 %  Probability of nephrotoxicity (Lodise BELTRAN ): 10  %    The next level will be obtained on 7/21 at 0500. May be obtained sooner if clinically indicated.   Will continue to monitor renal function daily while on vancomycin and order serum creatinine at least every 48 hours if not already ordered.  Follow for continued vancomycin needs, clinical response, and signs/symptoms of toxicity.       Amarilis Perea, PharmD

## 2024-07-18 NOTE — CARE PLAN
Palliative Care Social Work Note    Pt referred to [facility] via careport.  Meeting may be information only as family needing time to make decision.   See palliative care NP note for details.      [facility] meeting 7/23 @ 1700.  Team updated.

## 2024-07-18 NOTE — PROGRESS NOTES
"Delmar Casas is a 73 y.o. male on day 5 of admission presenting with Respiratory failure with hypoxia, unspecified chronicity (Multi).      Subjective   -Patient was seen in the ICU today-with precautions due to being on isolation  -Improving serum sodium and serum creatinine.  Good urine output     Objective          Vitals 24HR  Heart Rate:  [56-93]   Temp:  [36.3 °C (97.3 °F)-36.5 °C (97.7 °F)]   Resp:  [13-21]   BP: ()/(45-87)   Height:  [177.8 cm (5' 10\")]   Weight:  [67.8 kg (149 lb 7.6 oz)]   SpO2:  [87 %-100 %]         Intake/Output last 3 Shifts:    Intake/Output Summary (Last 24 hours) at 7/18/2024 1117  Last data filed at 7/18/2024 0646  Gross per 24 hour   Intake 500 ml   Output 800 ml   Net -300 ml       Physical Exam      Limited exam due to COVID-19 to limit exposure and transmission  General appearance: no distress.  Not awake.  Lying flat in bed.  NG tube in place  Eyes: non-icteric  Skin: no apparent rash  Neuro: No FND,asterixis, no focal deficits noticed  Pickett catheter in place with clear yellow urine     aspirin, 81 mg, oral, Daily  dexAMETHasone, 4 mg, intravenous, q24h  heparin (porcine), 5,000 Units, subcutaneous, q8h  insulin lispro, 0-5 Units, subcutaneous, q6h  mupirocin, , Topical, BID  nystatin, 4 mL, Swish & Swallow, TID  oxygen, , inhalation, Continuous - Inhalation  pantoprazole, 40 mg, intravenous, Daily  piperacillin-tazobactam, 2.25 g, intravenous, q6h  potassium phosphate, 15 mmol, intravenous, Once  rosuvastatin, 10 mg, oral, Daily  [Held by provider] senna, 10 mL, oral, BID  [Held by provider] tamsulosin, 0.4 mg, oral, Nightly  tiotropium, 2 puff, inhalation, Daily  vancomycin, 750 mg, intravenous, q24h      RFP  Recent Labs     07/18/24  0449 07/17/24  0533 07/16/24  0510 07/15/24  1758 07/15/24  0538 07/14/24  1137 07/14/24  0514    145 144 148* 146* 151* 153*   K 3.3* 3.5 3.6 3.6 3.5 3.8 4.1   * 113* 112* 113* 114* 117* 119*   CO2 23 23 24 24 23 23 23 "   BUN 35* 44* 52* 57* 68* 78* 83*   CREATININE 1.43* 1.65* 1.71* 1.95* 2.19* 2.92* 3.14*   GLUCOSE 176* 157* 159* 135* 148* 111* 162*   CALCIUM 7.5* 7.7* 7.8* 7.9* 7.8* 7.9* 7.6*   ALBUMIN 2.3* 2.3* 2.3* 2.3* 2.4* 2.4* 2.3*   PHOS 2.3* 3.0 3.2 3.6 3.5 3.2 3.1   EGFR 51* 43* 41* 35* 31* 22* 20*   ANIONGAP 11 13 12 15 13 15 15        Urineanalysis  Recent Labs     07/13/24 2115   COLORU Light-Orange*   APPEARANCEU Ex.Turbid*   SPECGRAVU 1.018   JOLANTA 5.5   PROTUR 70 (1+)*   GLUCOSEU Normal   BLOODU 0.2 (2+)*   KETONESU NEGATIVE   BILIRUBINU NEGATIVE   NITRITEU NEGATIVE   LEUKOCYTESU 500 Sly/µL*       Urine Electrolytes  Recent Labs     07/13/24 2115 07/13/24 2105   SODIUMURR  --  39   NACREATUR  --  28   KUR  --  59   KCREATUR  --  43   CREATU  --  137.1   PROTUR 70 (1+)*  --         Urine Micro  Recent Labs     07/13/24 2115   WBCU >50*   RBCU >20*   HYALCASTU 3+*   SQUAMEPIU 1-9 (SPARSE)   BACTERIAU 1+*   MUCUSU 1+        Iron  Recent Labs     07/14/24  0514   FERRITIN 728*       @Mg@    Lab Results   Component Value Date    WBC 7.7 07/18/2024    HGB 10.0 (L) 07/18/2024    HCT 33.8 (L) 07/18/2024    MCV 97 07/18/2024     07/18/2024       aspirin, 81 mg, oral, Daily  dexAMETHasone, 4 mg, intravenous, q24h  heparin (porcine), 5,000 Units, subcutaneous, q8h  insulin lispro, 0-5 Units, subcutaneous, q6h  mupirocin, , Topical, BID  nystatin, 4 mL, Swish & Swallow, TID  oxygen, , inhalation, Continuous - Inhalation  pantoprazole, 40 mg, intravenous, Daily  piperacillin-tazobactam, 2.25 g, intravenous, q6h  potassium phosphate, 15 mmol, intravenous, Once  rosuvastatin, 10 mg, oral, Daily  [Held by provider] senna, 10 mL, oral, BID  [Held by provider] tamsulosin, 0.4 mg, oral, Nightly  tiotropium, 2 puff, inhalation, Daily  vancomycin, 750 mg, intravenous, q24h          Assessment/Plan     Delmar Yessenia is a 73 y.o. male with past medical history of malignant neoplasm of unspecified  part of right bronchus, s/p CT  and immunotherapy(1 month ago), sepsis, recurrent UTI, CKD , oropharyngeal dysphagia, asthma (no home oxygen) , BPH, hematuria, hypokalemia, CAD, anemia, neutropenia, HLD, early dementia, mood disturbance, anxiety who presented to the ED for shortness of breath, gurgling in the throat and altered mental status.  Nephrology was consulted to manage acute kidney injury/severe hypernatremia      Principal Problem:    Respiratory failure with hypoxia, unspecified chronicity (Multi)  Active Problems:    Lung cancer (Multi)    COVID    Impression  # Acute kidney injury/CKD-with history of decreased p.o. intake in the nursing facility.  Possible prerenal.  Improving with fluid resuscitation D5W.  Currently nonoliguric.  No clear baseline serum creatinine (patient moved recently from [place]).  Family denies history of CKD.  Serum creatinine today is improving 2.1-->1.7--> 1.65--> 1.4 with GFR above 50    # Severe hypernatremia serum sodium 165 on presentation-most likely dehydration.-improving with resuscitation was IV D5W.  Currently normal    # Altered mental status/early dementia    # COVID-19 positive-on isolation    # Acute hypoxic respiratory failure/leukocytosis-currently on azithromycin/vancomycin/Zosyn    # Chronic Pickett catheter    # Blood pressure accepted-currently not on any medications     Recommendation plan  -No need for IV fluid from kidney standpoint.  Patient is able to receive p.o. fluid intake through NG tube.  Ensure at least 1.5 L of free water through NG tube  -No need for Pickett catheter from kidney standpoint.  Void trial per primary team.  Urology consultation as needed  -Continue to replete potassium to target potassium above 4  -Hypoalbuminemia most likely malnutrition-nutrition per primary team    Patient can be discharged from kidney standpoint.  Nephrology team will sign off at this time.  Please let us know if we can be of any further assistance          Kitty Tan MD

## 2024-07-18 NOTE — PROGRESS NOTES
07/18/24 1201   Discharge Planning   Living Arrangements Other (Comment)  ([facility]- Skilled)   Support Systems Children;Family members   Assistance Needed Patient was at [facility] in [place], [place] and was moved to [facility] where he was at for approximately 24 hours before coming to [facility]. Per son, up until his first chemo treatment in [place] he was walking without ambulation aides, A&O X3 and independent with ADLs. After his first chemo treatment he declined greatly. Per son patient was bedbound at [facility] in [place], [place], they were not getting him up to a WC. Patient is COVID positive and is currently requiring O2.   Type of Residence Private residence   Number of Stairs to Enter Residence 0   Number of Stairs Within Residence 0   Do you have animals or pets at home? No   Home or Post Acute Services Other (Comment)  (TBD- Hospice meeting pending)   Type of Post Acute Facility Services Other (Comment)  (TBD- Hospice meeting pending)   Expected Discharge Disposition Hospice Res  (Palliative care LSW made referral via CarePort to [facility]. [facility] to reach out to family to schedule meeting. NSOC TBD.)   Does the patient need discharge transport arranged? Yes   RoundTrip coordination needed? Yes   Has discharge transport been arranged? No

## 2024-07-18 NOTE — CARE PLAN
The patient's goals for the shift include      The clinical goals for the shift include remain pain free    .

## 2024-07-18 NOTE — PROGRESS NOTES
"Nutrition Progress Note  Nutrition Follow Up Note  Patient has Malnutrition Diagnosis:  (Unable to determine 2/2 limited history)  Nutrition Assessment         Nutrition History:  Food and Nutrient History:   (7/18/24) Pt seen for follow up visit, remains in isolation for COVID-19. Pt toelrating TF - no GRV noted.  Water flush increased to 300 ml q 4 hours; IVF discontinued 7/16/24.     (7/16/24) PT NPO x 3-4 days, possibly longer. Pt recently moved from [place], was at [facility] x 1 day PTA. pt has not been alert enough for oral intakes or to participate with SLP eval.  At NH, pt was hayley Puree diet with NTL, no straws. Pt now has NGT, plan to initiate enteral nutrition once x-ray confirms placement. IVF D5@ 50 ml/hr= 204 kcal, 60 g CHO in 1200 ml volume/24 hours  Energy Intake: Poor < 50 %  No Known Allergies   GI Symptoms: None     Oral Problems: Swallowing difficulty          Anthropometrics:  Height: 177.8 cm (5' 10\")   Weight: 67.8 kg (149 lb 7.6 oz)   BMI (Calculated): 21.45  IBW/kg (Dietitian Calculated): 75.5 kg  Percent of IBW: 92 %       Weight History:     Weight         7/15/2024  0745 7/16/2024  0600 7/16/2024  0800 7/17/2024  0600 7/17/2024  1500    Weight: 66.7 kg (147 lb 0.8 oz) 69.2 kg (152 lb 8.9 oz) 69.2 kg (152 lb 8.9 oz) 67.8 kg (149 lb 7.6 oz) 67.8 kg (149 lb 7.6 oz)            Nutrition Focused Physical Exam Findings:          Edema  Edema: +1 trace, +2 mild, +3 moderate  Edema Location: +1 RLE; +2 LUE; +3 RUE  Physical Findings (Nutrition Deficiency/Toxicity)  Skin: Positive (R nose wound, L hand skin tear, R arm skin tear, R elbow skin tear)    Nutrition Significant Labs:    Results from last 7 days   Lab Units 07/18/24  0449 07/17/24  0533 07/16/24  0510   GLUCOSE mg/dL 176* 157* 159*   SODIUM mmol/L 144 145 144   POTASSIUM mmol/L 3.3* 3.5 3.6   CHLORIDE mmol/L 113* 113* 112*   CO2 mmol/L 23 23 24   BUN mg/dL 35* 44* 52*   CREATININE mg/dL 1.43* 1.65* 1.71*   EGFR mL/min/1.73m*2 51* 43* 41* " "  CALCIUM mg/dL 7.5* 7.7* 7.8*   PHOSPHORUS mg/dL 2.3* 3.0 3.2   MAGNESIUM mg/dL 1.77 1.86 1.88     No results found for: \"HGBA1C\"  Results from last 7 days   Lab Units 07/18/24  1113 07/17/24  2308 07/17/24  1618 07/17/24  1131 07/16/24  2358 07/16/24  1639 07/16/24  1109 07/16/24  0002   POCT GLUCOSE mg/dL 131* 109* 133* 109* 111* 127* 140* 136*     Lab Results   Component Value Date    ALBUMIN 2.3 (L) 07/18/2024      No results found for: \"CRP\"        Nutrition Specific Medications:   Scheduled medications:  aspirin, 81 mg, oral, Daily  [START ON 7/20/2024] dexAMETHasone, 1.2 mg, intravenous, Once  [START ON 7/19/2024] dexAMETHasone, 2 mg, intravenous, Once  dexAMETHasone, 4 mg, intravenous, Once  heparin (porcine), 5,000 Units, subcutaneous, q8h  insulin lispro, 0-5 Units, subcutaneous, q6h  mupirocin, , Topical, BID  nystatin, 4 mL, Swish & Swallow, TID  oxygen, , inhalation, Continuous - Inhalation  pantoprazole, 40 mg, intravenous, Daily  piperacillin-tazobactam, 2.25 g, intravenous, q6h  rosuvastatin, 10 mg, oral, Daily  [Held by provider] senna, 10 mL, oral, BID  [Held by provider] tamsulosin, 0.4 mg, oral, Nightly  tiotropium, 2 puff, inhalation, Daily  vancomycin, 750 mg, intravenous, q24h      Continuous medications:     PRN medications:  PRN medications: acetaminophen **OR** acetaminophen **OR** acetaminophen, albuterol, bisacodyl, dextrose, glucagon, moisturizing mouth, vancomycin     Nursing Data Per flowsheet:   Stool Appearance: Loose (07/17/24 2100)  Gastrointestinal  Gastrointestinal (WDL): Exceptions to WDL  Abdomen Inspection: Soft  Bowel Sounds: All quadrants  Bowel Sounds (All Quadrants): Active  Last BM Date: 07/17/24  Bowel Incontinence: Yes  Stool Appearance: Loose  Stool Color: Brown  Gastrointestinal Symptoms: Loss of appetite  Feeding assistance level:      Intake/Output Summary (Last 24 hours) at 7/18/2024 1437  Last data filed at 7/18/2024 1238  Gross per 24 hour   Intake 855 ml "   Output 800 ml   Net 55 ml      0-10 (Numeric) Pain Score: 0 - No pain  Barron-Baker FACES Pain Rating: No hurt  PAINAD Score:  [0]    Dietary Orders (From admission, onward)       Start     Ordered    07/17/24 1319  Enteral feeding with NPO NG (nasogastric tube); 50; 300; Water; Tap water; Every 4 hours  Diet effective now        Comments: Start Jevity 1.5 @ 20 ml/hr x 24 hours via NGT. After 24 hours, if no major shifts in electrolytes and blood sugars are acceptable, increase TF by 10 ml/hr every 6-8 hours until goal met at 50 ml/hr.   Question Answer Comment   Tube feeding formula: Jevity 1.5    Feeding route: NG (nasogastric tube)    Tube feeding continuous rate (mL/hr): 50    Tube feeding flush (mL): 300    Flush type: Water    Water type: Tap water    Flush frequency: Every 4 hours        07/17/24 1318    07/15/24 1026  May Participate in Room Service  Once        Question:  .  Answer:  Yes    07/15/24 1026                     Estimated Needs:   Total Energy Estimated Needs (kCal):  (3193-4231 kcal (25-30 kcal/kg))     Total Protein Estimated Needs (g):  ( g protein (1.2-1.5 g/kg))     Total Fluid Estimated Needs (mL):  (1 ml/kcal)          Nutrition Diagnosis   Malnutrition Diagnosis  Patient has Malnutrition Diagnosis:  (Unable to determine 2/2 limited history)    Nutrition Diagnosis  Patient has Nutrition Diagnosis: Yes  Diagnosis Status (1): Ongoing  Nutrition Diagnosis 1: Inadequate protein energy intake  Related to (1): inability to take oral diet with altered mental status  As Evidenced by (1): no oral intakes x 3-4 days, possibly longer  Additional Assessment Information (1): Plan to initiate EN via NGT       Nutrition Interventions/Recommendations   Nutrition Prescription:  EN    Nutrition Interventions:   Interventions: Enteral intake  Goal: Jevity 1.5 @ 50 ml/hr= 1800 kcal, 77 g protein, 912 ml free water in 1200 ml volume/ 24 hours; additional 300 ml FWF every 4 hours= 2712 ml free water/24  hours      Coordination of Care: Dr. Jiménez  Nutrition Education:   N/A    Recommendations:  Continue to advance TF to goal rate  Water flush per critical care and nephrology  GOC per palliative care          Nutrition Monitoring and Evaluation   Food/Nutrient Related History Monitoring  Monitoring and Evaluation Plan: Energy intake, Enteral and parenteral nutrition intake  Criteria: Pt will tolerate TF at goal rate of 50 ml/hr within next 3-4 days    Body Composition/Growth/Weight History  Monitoring and Evaluation Plan: Weight  Weight: Measured weight  Criteria: Monitor weights as available    Biochemical Data, Medical Tests and Procedures  Monitoring and Evaluation Plan: Electrolyte/renal panel, Glucose/endocrine profile  Criteria: Monitor labs as available      Time Spent (min): 45 minutes

## 2024-07-19 LAB
ALBUMIN SERPL BCP-MCNC: 2.5 G/DL (ref 3.4–5)
ANION GAP SERPL CALC-SCNC: 11 MMOL/L (ref 10–20)
BUN SERPL-MCNC: 30 MG/DL (ref 6–23)
CALCIUM SERPL-MCNC: 7.8 MG/DL (ref 8.6–10.3)
CHLORIDE SERPL-SCNC: 110 MMOL/L (ref 98–107)
CO2 SERPL-SCNC: 25 MMOL/L (ref 21–32)
CREAT SERPL-MCNC: 1.15 MG/DL (ref 0.5–1.3)
EGFRCR SERPLBLD CKD-EPI 2021: 66 ML/MIN/1.73M*2
ERYTHROCYTE [DISTWIDTH] IN BLOOD BY AUTOMATED COUNT: 17.5 % (ref 11.5–14.5)
GLUCOSE BLD MANUAL STRIP-MCNC: 100 MG/DL (ref 74–99)
GLUCOSE BLD MANUAL STRIP-MCNC: 103 MG/DL (ref 74–99)
GLUCOSE BLD MANUAL STRIP-MCNC: 108 MG/DL (ref 74–99)
GLUCOSE BLD MANUAL STRIP-MCNC: 127 MG/DL (ref 74–99)
GLUCOSE BLD MANUAL STRIP-MCNC: 140 MG/DL (ref 74–99)
GLUCOSE SERPL-MCNC: 135 MG/DL (ref 74–99)
HCT VFR BLD AUTO: 35.2 % (ref 41–52)
HGB BLD-MCNC: 11.2 G/DL (ref 13.5–17.5)
MAGNESIUM SERPL-MCNC: 1.84 MG/DL (ref 1.6–2.4)
MCH RBC QN AUTO: 29.3 PG (ref 26–34)
MCHC RBC AUTO-ENTMCNC: 31.8 G/DL (ref 32–36)
MCV RBC AUTO: 92 FL (ref 80–100)
NRBC BLD-RTO: 0 /100 WBCS (ref 0–0)
PHOSPHATE SERPL-MCNC: 2.6 MG/DL (ref 2.5–4.9)
PLATELET # BLD AUTO: 178 X10*3/UL (ref 150–450)
POTASSIUM SERPL-SCNC: 4 MMOL/L (ref 3.5–5.3)
RBC # BLD AUTO: 3.82 X10*6/UL (ref 4.5–5.9)
SODIUM SERPL-SCNC: 142 MMOL/L (ref 136–145)
WBC # BLD AUTO: 12.4 X10*3/UL (ref 4.4–11.3)

## 2024-07-19 PROCEDURE — C9113 INJ PANTOPRAZOLE SODIUM, VIA: HCPCS

## 2024-07-19 PROCEDURE — 2500000004 HC RX 250 GENERAL PHARMACY W/ HCPCS (ALT 636 FOR OP/ED)

## 2024-07-19 PROCEDURE — 99233 SBSQ HOSP IP/OBS HIGH 50: CPT

## 2024-07-19 PROCEDURE — 2500000001 HC RX 250 WO HCPCS SELF ADMINISTERED DRUGS (ALT 637 FOR MEDICARE OP)

## 2024-07-19 PROCEDURE — 94668 MNPJ CHEST WALL SBSQ: CPT

## 2024-07-19 PROCEDURE — 37799 UNLISTED PX VASCULAR SURGERY: CPT

## 2024-07-19 PROCEDURE — 82947 ASSAY GLUCOSE BLOOD QUANT: CPT

## 2024-07-19 PROCEDURE — 80069 RENAL FUNCTION PANEL: CPT

## 2024-07-19 PROCEDURE — 85027 COMPLETE CBC AUTOMATED: CPT

## 2024-07-19 PROCEDURE — 92526 ORAL FUNCTION THERAPY: CPT | Mod: GN

## 2024-07-19 PROCEDURE — 1200000002 HC GENERAL ROOM WITH TELEMETRY DAILY

## 2024-07-19 PROCEDURE — 83735 ASSAY OF MAGNESIUM: CPT

## 2024-07-19 PROCEDURE — 2500000004 HC RX 250 GENERAL PHARMACY W/ HCPCS (ALT 636 FOR OP/ED): Performed by: INTERNAL MEDICINE

## 2024-07-19 RX ORDER — VANCOMYCIN HYDROCHLORIDE 1 G/200ML
1000 INJECTION, SOLUTION INTRAVENOUS EVERY 24 HOURS
Status: DISCONTINUED | OUTPATIENT
Start: 2024-07-19 | End: 2024-07-20

## 2024-07-19 RX ORDER — NAPROXEN SODIUM 220 MG/1
81 TABLET, FILM COATED ORAL DAILY
Status: DISCONTINUED | OUTPATIENT
Start: 2024-07-19 | End: 2024-07-23 | Stop reason: HOSPADM

## 2024-07-19 RX ADMIN — PIPERACILLIN SODIUM AND TAZOBACTAM SODIUM 2.25 G: 2; .25 INJECTION, SOLUTION INTRAVENOUS at 06:36

## 2024-07-19 RX ADMIN — PANTOPRAZOLE SODIUM 40 MG: 40 INJECTION, POWDER, FOR SOLUTION INTRAVENOUS at 09:28

## 2024-07-19 RX ADMIN — PIPERACILLIN SODIUM AND TAZOBACTAM SODIUM 2.25 G: 2; .25 INJECTION, SOLUTION INTRAVENOUS at 18:05

## 2024-07-19 RX ADMIN — NYSTATIN 400000 UNITS: 100000 SUSPENSION ORAL at 15:02

## 2024-07-19 RX ADMIN — HEPARIN SODIUM 5000 UNITS: 5000 INJECTION INTRAVENOUS; SUBCUTANEOUS at 07:46

## 2024-07-19 RX ADMIN — HEPARIN SODIUM 5000 UNITS: 5000 INJECTION INTRAVENOUS; SUBCUTANEOUS at 16:03

## 2024-07-19 RX ADMIN — ASPIRIN 81 MG CHEWABLE TABLET 81 MG: 81 TABLET CHEWABLE at 09:49

## 2024-07-19 RX ADMIN — PIPERACILLIN SODIUM AND TAZOBACTAM SODIUM 2.25 G: 2; .25 INJECTION, SOLUTION INTRAVENOUS at 23:43

## 2024-07-19 RX ADMIN — NYSTATIN 400000 UNITS: 100000 SUSPENSION ORAL at 09:28

## 2024-07-19 RX ADMIN — NYSTATIN 400000 UNITS: 100000 SUSPENSION ORAL at 21:12

## 2024-07-19 RX ADMIN — HEPARIN SODIUM 5000 UNITS: 5000 INJECTION INTRAVENOUS; SUBCUTANEOUS at 23:47

## 2024-07-19 RX ADMIN — VANCOMYCIN HYDROCHLORIDE 1000 MG: 1 INJECTION, SOLUTION INTRAVENOUS at 10:50

## 2024-07-19 RX ADMIN — PIPERACILLIN SODIUM AND TAZOBACTAM SODIUM 2.25 G: 2; .25 INJECTION, SOLUTION INTRAVENOUS at 12:09

## 2024-07-19 RX ADMIN — HEPARIN SODIUM 5000 UNITS: 5000 INJECTION INTRAVENOUS; SUBCUTANEOUS at 02:39

## 2024-07-19 RX ADMIN — DEXAMETHASONE SODIUM PHOSPHATE 2 MG: 4 INJECTION INTRA-ARTICULAR; INTRALESIONAL; INTRAMUSCULAR; INTRAVENOUS; SOFT TISSUE at 23:48

## 2024-07-19 ASSESSMENT — PAIN SCALES - PAIN ASSESSMENT IN ADVANCED DEMENTIA (PAINAD)
TOTALSCORE: 1
TOTALSCORE: 0
BODYLANGUAGE: TENSE, DISTRESSED PACING, FIDGETING
NEGVOCALIZATION: OCCASIONAL MOAN/GROAN, LOW SPEECH, NEGATIVE/DISAPPROVING QUALITY
BODYLANGUAGE: TENSE, DISTRESSED PACING, FIDGETING
TOTALSCORE: 0
TOTALSCORE: 0
TOTALSCORE: 4
FACIALEXPRESSION: SMILING OR INEXPRESSIVE
CONSOLABILITY: NO NEED TO CONSOLE
TOTALSCORE: REPOSITIONED
FACIALEXPRESSION: SMILING OR INEXPRESSIVE
BREATHING: NORMAL
BREATHING: NORMAL
CONSOLABILITY: NO NEED TO CONSOLE
BREATHING: NORMAL
BODYLANGUAGE: RELAXED
FACIALEXPRESSION: SMILING OR INEXPRESSIVE
CONSOLABILITY: NO NEED TO CONSOLE
BODYLANGUAGE: RELAXED
CONSOLABILITY: NO NEED TO CONSOLE
BREATHING: NORMAL
CONSOLABILITY: UNABLE TO CONSOLE, DISTRACT OR REASSURE
BREATHING: NORMAL
BODYLANGUAGE: RELAXED

## 2024-07-19 ASSESSMENT — PAIN - FUNCTIONAL ASSESSMENT
PAIN_FUNCTIONAL_ASSESSMENT: PAINAD (PAIN ASSESSMENT IN ADVANCED DEMENTIA SCALE)

## 2024-07-19 ASSESSMENT — PAIN SCALES - GENERAL
PAINLEVEL_OUTOF10: 0 - NO PAIN
PAINLEVEL_OUTOF10: 4
PAINLEVEL_OUTOF10: 0 - NO PAIN
PAINLEVEL_OUTOF10: 0 - NO PAIN

## 2024-07-19 NOTE — CARE PLAN
The patient's goals for the shift include      The clinical goals for the shift include pt will remain free of injury    Over the shift, the patient did not make progress toward the following goals. Barriers to progression include . Recommendations to address these barriers include .

## 2024-07-19 NOTE — PROGRESS NOTES
07/19/24 1632   Discharge Planning   Living Arrangements Other (Comment)   Support Systems Children;Family members   Assistance Needed Pt discharge needs to be determined.  Since pt is recently from [place], he is not yet fully enrolled with [facility].  Meenakshi at [facility] states she put out a call to [facility] to determine what is needed if pt is to return to [facility] on discharge.   Type of Residence Private residence   Home or Post Acute Services Post acute facilities (Rehab/SNF/etc)   Type of Post Acute Facility Services   (SNF vs LTC VS hospice?)   Expected Discharge Disposition Hospice Res   Does the patient need discharge transport arranged? Yes   RoundTrip coordination needed? Yes   Has discharge transport been arranged? No

## 2024-07-19 NOTE — CARE PLAN
The clinical goals for the shift include remain safe.       Problem: Skin  Goal: Decreased wound size/increased tissue granulation at next dressing change  Outcome: Progressing  Flowsheets (Taken 7/18/2024 2158)  Decreased wound size/increased tissue granulation at next dressing change: Promote sleep for wound healing     Problem: Safety - Adult  Goal: Free from fall injury  Outcome: Progressing     Problem: Fall/Injury  Goal: Be free from injury by end of the shift  Outcome: Progressing

## 2024-07-19 NOTE — PROGRESS NOTES
Vancomycin Dosing by Pharmacy- FOLLOW UP    Delmar Casas is a 73 y.o. year old male who Pharmacy has been consulted for vancomycin dosing for pneumonia. Based on the patient's indication and renal status this patient is being dosed based on a goal AUC of 400-600.     Renal function is currently improving.    Current vancomycin dose: 750 mg given every 12 hours    Estimated vancomycin AUC on current dose: 370 mg/L.hr     Visit Vitals  BP (!) 147/106   Pulse 85   Temp 35.9 °C (96.6 °F) (Temporal)   Resp 21        Lab Results   Component Value Date    CREATININE 1.15 2024    CREATININE 1.43 (H) 2024    CREATININE 1.65 (H) 2024    CREATININE 1.71 (H) 2024        Patient weight is as follows:   Vitals:    24 0800   Weight: 71.1 kg (156 lb 12 oz)       Cultures:  Susceptibility data for the encounter in last 14 days.  Collected Specimen Info Organism Nitrofurantoin Oxacillin Trimethoprim/Sulfamethoxazole Vancomycin   24 Swab from Anterior Nares Methicillin Resistant Staphylococcus aureus (MRSA)       24 Urine from Indwelling (Pickett) Catheter Methicillin Resistant Staphylococcus aureus (MRSA)  S  R  S  S        I/O last 3 completed shifts:  In: 3199 (47.2 mL/kg) [NG/GT:2494; IV Piggyback:705]  Out: 1850 (27.3 mL/kg) [Urine:1850 (0.8 mL/kg/hr)]  Weight: 67.8 kg   I/O during current shift:  No intake/output data recorded.    Temp (24hrs), Av.9 °C (96.7 °F), Min:35.9 °C (96.6 °F), Max:36 °C (96.8 °F)      Assessment/Plan    Below goal AUC. Orders placed for new vancomcyin regimen of 1000mg every 24 hours to begin at 11am.     This dosing regimen is predicted by [employer]ight[employer] to result in the following pharmacokinetic parameters:  Regimen: 1000 mg IV every 24 hours.  Start time: 09:58 on 2024  Exposure target: AUC24 (range)400-600 mg/L.hr   AUC24,ss: 482 mg/L.hr  Probability of AUC24 > 400: 95 %  Ctrough,ss: 14.8 mg/L  Probability of Ctrough,ss > 20: 5 %  Probability of  nephrotoxicity (Lodise BELTRAN 2009): 10 %      The next level will be obtained on 7/21 at 6am. May be obtained sooner if clinically indicated.   Will continue to monitor renal function daily while on vancomycin and order serum creatinine at least every 48 hours if not already ordered.  Follow for continued vancomycin needs, clinical response, and signs/symptoms of toxicity.       Anai Villa, [facility]

## 2024-07-19 NOTE — PROGRESS NOTES
"Subjective   Subjective:   Overnight Events  No acute events overnight     Subjective  Patient seen and examined, awake and mumbles few words but does not communicate much.     Review Of Systems:  12-point ROS was performed and is negative except as noted below and in the HPI.      Objective   Objective:     /66   Pulse 71   Temp 35.9 °C (96.6 °F)   Resp 14   Ht 1.778 m (5' 10\")   Wt 71.1 kg (156 lb 12 oz)   SpO2 98%   BMI 22.49 kg/m²     Physical Exam  Constitutional:       General: He is not in acute distress.     Appearance: Normal appearance. He is ill-appearing.   Neck:      Vascular: No carotid bruit.   Cardiovascular:      Rate and Rhythm: Normal rate and regular rhythm.      Pulses: Normal pulses.      Heart sounds: Normal heart sounds. No murmur heard.     No friction rub. No gallop.   Pulmonary:      Effort: Pulmonary effort is normal. No respiratory distress.      Breath sounds: Normal breath sounds. No wheezing, rhonchi or rales.   Abdominal:      General: Abdomen is flat. Bowel sounds are normal. There is no distension.      Palpations: Abdomen is soft. There is no mass.      Tenderness: There is no abdominal tenderness. There is no guarding or rebound.   Musculoskeletal:         General: No swelling or tenderness. Normal range of motion.      Cervical back: Normal range of motion and neck supple. No rigidity or tenderness.   Skin:     General: Skin is warm and dry.      Coloration: Skin is not jaundiced.      Findings: No erythema or rash.   Neurological:      Motor: No weakness.      Comments: A&O x1-2, mumbling words   Psychiatric:         Mood and Affect: Mood normal.         Behavior: Behavior normal.       Lab Work:     Lab Results   Component Value Date    WBC 12.4 (H) 07/19/2024    HGB 11.2 (L) 07/19/2024    HCT 35.2 (L) 07/19/2024    MCV 92 07/19/2024     07/19/2024     Lab Results   Component Value Date    GLUCOSE 135 (H) 07/19/2024    CALCIUM 7.8 (L) 07/19/2024     " "07/19/2024    K 4.0 07/19/2024    CO2 25 07/19/2024     (H) 07/19/2024    BUN 30 (H) 07/19/2024    CREATININE 1.15 07/19/2024     No results found for: \"HGBA1C\", \"TSH\", \"VITD25\"  Cultures:   Susceptibility data from last 90 days.  Collected Specimen Info Organism Nitrofurantoin Oxacillin Trimethoprim/Sulfamethoxazole Vancomycin   07/13/24 Swab from Anterior Nares Methicillin Resistant Staphylococcus aureus (MRSA)       07/13/24 Urine from Indwelling (Pickett) Catheter Methicillin Resistant Staphylococcus aureus (MRSA)  S  R  S  S     Images:     CT chest wo IV contrast   Final Result   Tree-in-bud opacities throughout the lungs is suspicious for   bronchopneumonia.        Extensive coronary atherosclerosis is present but no evidence of   cardiomegaly, pulmonary edema, or pleural effusions.        Right upper lobe bronchiectasis and scarred consolidation are noted   which can be seen in the setting of prior right upper lobe radiation   treatment. If patient has prior lung malignancy treated with   radiation, then recommend comparing with prior oncologic exams to   ensure stability. If patient does not have prior radiation treatment,   then recommend pulmonology consultation and further characterization   with PET-CT.        Left adrenal gland nodule measuring 2.7 cm is indeterminate. If   outside imaging is available, recommend comparison. Otherwise,   recommend characterization with adrenal protocol CT.        Subtle nodular contour of the liver parenchyma raises suspicion for   underlying liver disease or early cirrhosis.             MACRO:   Critical Finding:  See findings. Notification was initiated on   7/13/2024 at 7:19 pm by  Cristiano Brown.  (**-YCF-**) Instructions:        Signed by: Cristiano Brown 7/13/2024 7:20 PM   Dictation workstation:   [id number]         Medications:     Scheduled:  aspirin, 81 mg, oral, Daily  [START ON 7/20/2024] dexAMETHasone, 1.2 mg, intravenous, Once  dexAMETHasone, 2 mg, " intravenous, Once  heparin (porcine), 5,000 Units, subcutaneous, q8h  insulin lispro, 0-5 Units, subcutaneous, q6h  nystatin, 4 mL, Swish & Swallow, TID  oxygen, , inhalation, Continuous - Inhalation  pantoprazole, 40 mg, intravenous, Daily  piperacillin-tazobactam, 2.25 g, intravenous, q6h  rosuvastatin, 10 mg, oral, Daily  [Held by provider] senna, 10 mL, oral, BID  [Held by provider] tamsulosin, 0.4 mg, oral, Nightly  tiotropium, 2 puff, inhalation, Daily  vancomycin, 1,000 mg, intravenous, q24h    Continuous:       PRN:  PRN medications: acetaminophen **OR** acetaminophen **OR** acetaminophen, albuterol, bisacodyl, dextrose, glucagon, moisturizing mouth, vancomycin     Assessment & Plan:   Delmar Casas is a 73 y.o. male with past medical history of malignant neoplasm of unspecified part of right bronchus, s/p CT and immunotherapy(1 month ago), sepsis, recurrent UTI, CKD stage IV, oropharyngeal dysphagia, asthma (no home oxygen) , BPH, hematuria, hypokalemia, CAD, anemia, neutropenia, HLD, early dementia, mood disturbance, anxiety admitted to the ICU with AHRF 2/2 PNA, COVID positive, severe hypernatremia and LUPILLO on CKD.     NEURO/PSYCH:  # Acute metabolic encephalopathy 2/2 PNA  # Early dementia  # Anxiety, mood disturbance  - Hold home hydroxyzine 5 mg and Ativan 0.5 mg PRN  - Monitor for ICU delirium   - Fall precautions in place, soft restraints for agitation  - Treating PNA as below     CARDIOVASCULAR:  # CAD  # HLD  # Prolonged Qtc (resolved)  - Continue aspirin 81mg   - Continue rosuvastatin 10mg    PULMONARY:  # AHRF 2/2 HAP   # COVID positive  # Hx of lung cancer  # Hx of asthma/COPD   - Pt presented with AHRF requiring 15 L HFNC, later placed on Airvo, currently on 3L  - Hx 120 pack year tobacco use   - CT chest bronchopneumonia, Right upper lobe bronchiectasis and scarred consolidation are noted which can be seen in the setting of prior right upper lobe radiation treatment  - MRSA nares (+), high  D-dimer 1,808, fibrinogen 510  - Vascular US lower extremity negative  - CT angio chest PE negative for PE   - S/p Azithro, continue Vanc + Zosyn until 7/20   - S/p Remdesivir, taper Dex 2mg 7/19 and 1mg on 7/20  - On Spiriva Respimat daily and albuterol q6h PRN  - ICS, Acapella, chest PT   - Aspiration precautions  - NPO, SLP 7/19 recommended NPO with small pleasure feedings of pureed/honey thick as tolerated  - Meeting with [facility] on Tuesday 7/23    RENAL/UROLOGY:  # Severe hypernatremia (resolved)   # Hyperchloremia  - Pt presented with AMS, Na 165 on admission   - 2/2 dehydration and decreased po intake (not eating much d/t sore throat)   - Nephrology signed off   - Continue FWF to ensure 1.5L free water via NGT daily      # LUPILLO on CKD Stage IV (resolved)  # Hx of recurrent UTI   - Cr 3.63 on admission, baseline not clear as no previous records available  - Previously being treated for recurrent UTI  - UA significant for high LE and pyuria  - Urine culture (7/13/24): MRSA   - Nephrology following, appreciate jacque   - Had Pickett for 2 months, was placed during last hospital stay in [place]. Exchanged 7/14, removed 7/18      # Left adrenal gland nodule  - CT chest showed left adrenal nodule measuring 2.7 centimeters  - Radiology recommended comparison-outside imaging available, otherwise characterization with adrenal protocol CT     # BPH  - Holding Tamsulosin 0.4mg (cannot get via NGT)    GASTROINTESTINAL:  # Oropharyngeal dysphagia  # Decreased p.o. intake   # Sore throat s/p CT and immunotherapy  # Liver nodule (possible cirrhosis/liver disease)  - NPO, SLP 7/19 recommended NPO with small pleasure feedings of pureed/honey thick as tolerated  - Aspiration precautions  - On bowel regimen for constipation  - Monitor liver nodule  - NG tube placed 7/16, continue TF and FWF   - Continue Protonix 40mg IV daily   - Surgery consulted for PEG tube placement      HEME/ONC:  # Malignant neoplasm of unspecified part of right  bronchus  # S/P CT and Immunotherapy  # History of basal cell carcinoma on the nose  # Hx Anemia  - Had recent chemotherapy and immunotherapy about a month ago  - Developed sepsis followed by hospitalization and SNF after chemo  - High D-dimer 1,808, PT/INR normal  - LE DVT US negative   - CT angio chest negative for PE   - Holding PO iron     ENDOCRINE:  # Mild hyperglycemia   - 2/2 5% dextrose  - No history of DM  - On NPO SSI    INFECTIOUS:  # Sepsis 2/2 PNA vs UTI   - SIRS score 3, lactate normal 1.9  - S/p 1L LR bolus, vancomycin and Zosyn in the ED  - Urine culture (7/13/24): MRSA   - Blood cultures (7/13/24): NGTD  - Continue Vanc + Zosyn until 7/20      # PNA  # COVID positive  - See pulm above  - Continue iso for 10 days after positive test per policy     MSK/SKIN:  # Left hand wound  - Wound care consulted    Fluid: FWF @ 300 every 4 hrs   Electrolytes: Replete PRN  Nutrition: Jevity 1.5 @ 50cc/hr    GI ppx: PPI   DVT/PE ppx: Heparin   Abx: Vanc + Zosyn   Drips: None   Lines/Tubes/Drains: Left chest Mediport, left sided midline (from prior to admission), PIV, NGT, Pickett removed 7/19  Oxygen: RA    Code: Full code     Dispo: Stable for transfer to any tele floor, meeting with [facility] on Tuesday 7/23. Surgery consulted for PEG tube placement.     Yohana Jiménez, PGY-3  Internal Medicine

## 2024-07-19 NOTE — PROGRESS NOTES
Speech-Language Pathology    Speech-Language Pathology Clinical Swallow Treatment    Patient Name: Delmar Casas  MRN: [id number]  : 1950  Today's Date: 24  Start time: Start Time: 1450  Stop time: Stop Time: 1505  Time calculation (min) : Time Calculation (min): 15 min    ASSESSMENT  SLP TX Intervention Outcome: Making Progress Towards Goals- severe dysphagia apparent. Limited cognition which will interfere with safe oral intake of sufficient nutrition / hydration.   Treatment Tolerance: Patient tolerated treatment well     The patient tolerated very few trials before declining more. Oral phase dysphagia with long oral holding (over 10 seconds) when not cued. Confusion with sipping from the straw- blowing on some trials. Wet vocal quality/ respirations before and after intake suggestive of aspiration.      Impressions: Severe dysphagia with inability to safely support nutrition and hydration orally. The patient may be able to tolerate small snacks of pureed with IDDSI 3/honey thick liquids for pleasure without aspiration.      PLAN  MBSS recommended: no. Pt's cooperation/ cognition are insufficient to support participation in the study.   Recommended solid consistency: NPO  Recommended liquid consistency: NPO- small pleasure feedings of pureed with IDDSI 3/honey thick liquids as tolerated/ desired by the patient. ONLY if participating in regular and rigorous oral care.   Recommended medication administration: Non oral  Discharge recommendation: Recommend LOW intensity ST upon DC in order to ensure safety with least restrictive diet/ provide ongoing assessment of swallowing functioning as mentation potentially returns to his baseline.   Inpatient/Swing Bed or Outpatient: Inpatient  SLP TX Plan: Continue Plan of Care  SLP Plan: Skilled SLP  SLP Frequency: 2x per week  Duration: Current admission     SUBJECTIVE  Prior to Session Communication: Bedside nurse  RN cleared pt to participate in session and  reported that he is  more alert this afternoon.  Respiratory status: Supplemental oxygen via NC  Positioning: Partially reclined due to mobility impairment, hands in restraints.   Pt was cooperative with cues for session.     Pain Assessment  Pain Assessment: PAINAD (Pain Assessment in Advanced Dementia Scale)  0-10 (Numeric) Pain Score: 0 - No pain  Orientation: unable to report.   Ability to follow functional commands: Follows simple commands inconsistently     OBJECTIVE  Therapeutic Swallow  Therapeutic Swallow Intervention : PO Trials  Liquid Diet Recommendations: Nectar thick/mildly thick (IDDS Level 2)     Treatment/Education:  Results and recommendations were relayed to: Bedside nurse and Physician  Education provided: No              Learner: Patient              Barriers to learning: Cognitive limitations barrier                Goals (start date 7/19/24. Anticipated time frame for goal attainment: 1 week ):  Patient will participate in ongoing evaluation of swallowing functioning to ensure he is on the safest and least restrictive diet level.                Status: Goal initiated this date- further evaluation scheduled for this afternoon.               Progress this date: completed evaluation. See notes above.      End date for goal: 7/19/24

## 2024-07-20 LAB
ALBUMIN SERPL BCP-MCNC: 2.4 G/DL (ref 3.4–5)
ANION GAP SERPL CALC-SCNC: 10 MMOL/L (ref 10–20)
BUN SERPL-MCNC: 28 MG/DL (ref 6–23)
CALCIUM SERPL-MCNC: 7.9 MG/DL (ref 8.6–10.3)
CHLORIDE SERPL-SCNC: 109 MMOL/L (ref 98–107)
CO2 SERPL-SCNC: 27 MMOL/L (ref 21–32)
CREAT SERPL-MCNC: 1.22 MG/DL (ref 0.5–1.3)
EGFRCR SERPLBLD CKD-EPI 2021: 62 ML/MIN/1.73M*2
ERYTHROCYTE [DISTWIDTH] IN BLOOD BY AUTOMATED COUNT: 18.3 % (ref 11.5–14.5)
GLUCOSE BLD MANUAL STRIP-MCNC: 181 MG/DL (ref 74–99)
GLUCOSE BLD MANUAL STRIP-MCNC: 56 MG/DL (ref 74–99)
GLUCOSE BLD MANUAL STRIP-MCNC: 86 MG/DL (ref 74–99)
GLUCOSE BLD MANUAL STRIP-MCNC: 86 MG/DL (ref 74–99)
GLUCOSE BLD MANUAL STRIP-MCNC: 99 MG/DL (ref 74–99)
GLUCOSE SERPL-MCNC: 118 MG/DL (ref 74–99)
HCT VFR BLD AUTO: 35.4 % (ref 41–52)
HGB BLD-MCNC: 11.1 G/DL (ref 13.5–17.5)
MAGNESIUM SERPL-MCNC: 1.77 MG/DL (ref 1.6–2.4)
MCH RBC QN AUTO: 29 PG (ref 26–34)
MCHC RBC AUTO-ENTMCNC: 31.4 G/DL (ref 32–36)
MCV RBC AUTO: 92 FL (ref 80–100)
NRBC BLD-RTO: 0 /100 WBCS (ref 0–0)
PHOSPHATE SERPL-MCNC: 2.9 MG/DL (ref 2.5–4.9)
PLATELET # BLD AUTO: 182 X10*3/UL (ref 150–450)
POTASSIUM SERPL-SCNC: 4.2 MMOL/L (ref 3.5–5.3)
RBC # BLD AUTO: 3.83 X10*6/UL (ref 4.5–5.9)
SODIUM SERPL-SCNC: 142 MMOL/L (ref 136–145)
WBC # BLD AUTO: 13.3 X10*3/UL (ref 4.4–11.3)

## 2024-07-20 PROCEDURE — 2500000001 HC RX 250 WO HCPCS SELF ADMINISTERED DRUGS (ALT 637 FOR MEDICARE OP)

## 2024-07-20 PROCEDURE — 2500000005 HC RX 250 GENERAL PHARMACY W/O HCPCS: Performed by: INTERNAL MEDICINE

## 2024-07-20 PROCEDURE — 2500000004 HC RX 250 GENERAL PHARMACY W/ HCPCS (ALT 636 FOR OP/ED): Performed by: INTERNAL MEDICINE

## 2024-07-20 PROCEDURE — 2500000001 HC RX 250 WO HCPCS SELF ADMINISTERED DRUGS (ALT 637 FOR MEDICARE OP): Performed by: INTERNAL MEDICINE

## 2024-07-20 PROCEDURE — 99233 SBSQ HOSP IP/OBS HIGH 50: CPT | Performed by: STUDENT IN AN ORGANIZED HEALTH CARE EDUCATION/TRAINING PROGRAM

## 2024-07-20 PROCEDURE — 74018 RADEX ABDOMEN 1 VIEW: CPT | Performed by: RADIOLOGY

## 2024-07-20 PROCEDURE — 1200000002 HC GENERAL ROOM WITH TELEMETRY DAILY

## 2024-07-20 PROCEDURE — C9113 INJ PANTOPRAZOLE SODIUM, VIA: HCPCS | Performed by: INTERNAL MEDICINE

## 2024-07-20 PROCEDURE — 2500000002 HC RX 250 W HCPCS SELF ADMINISTERED DRUGS (ALT 637 FOR MEDICARE OP, ALT 636 FOR OP/ED): Performed by: INTERNAL MEDICINE

## 2024-07-20 PROCEDURE — 99221 1ST HOSP IP/OBS SF/LOW 40: CPT | Performed by: SURGERY

## 2024-07-20 PROCEDURE — 83735 ASSAY OF MAGNESIUM: CPT | Performed by: INTERNAL MEDICINE

## 2024-07-20 PROCEDURE — 85027 COMPLETE CBC AUTOMATED: CPT | Performed by: INTERNAL MEDICINE

## 2024-07-20 PROCEDURE — 82947 ASSAY GLUCOSE BLOOD QUANT: CPT

## 2024-07-20 PROCEDURE — 80069 RENAL FUNCTION PANEL: CPT | Performed by: INTERNAL MEDICINE

## 2024-07-20 RX ORDER — LIDOCAINE HYDROCHLORIDE 40 MG/ML
200 INJECTION, SOLUTION RETROBULBAR; TOPICAL ONCE
Status: DISCONTINUED | OUTPATIENT
Start: 2024-07-20 | End: 2024-07-23 | Stop reason: HOSPADM

## 2024-07-20 RX ORDER — ACETAMINOPHEN 500 MG
5 TABLET ORAL NIGHTLY
Status: DISCONTINUED | OUTPATIENT
Start: 2024-07-20 | End: 2024-07-23 | Stop reason: HOSPADM

## 2024-07-20 RX ADMIN — HEPARIN SODIUM 5000 UNITS: 5000 INJECTION INTRAVENOUS; SUBCUTANEOUS at 09:32

## 2024-07-20 RX ADMIN — Medication 5 MG: at 23:32

## 2024-07-20 RX ADMIN — PANTOPRAZOLE SODIUM 40 MG: 40 INJECTION, POWDER, FOR SOLUTION INTRAVENOUS at 09:32

## 2024-07-20 RX ADMIN — NYSTATIN 400000 UNITS: 100000 SUSPENSION ORAL at 17:29

## 2024-07-20 RX ADMIN — PIPERACILLIN SODIUM AND TAZOBACTAM SODIUM 2.25 G: 2; .25 INJECTION, SOLUTION INTRAVENOUS at 12:56

## 2024-07-20 RX ADMIN — NYSTATIN 400000 UNITS: 100000 SUSPENSION ORAL at 23:29

## 2024-07-20 RX ADMIN — PIPERACILLIN SODIUM AND TAZOBACTAM SODIUM 2.25 G: 2; .25 INJECTION, SOLUTION INTRAVENOUS at 06:03

## 2024-07-20 RX ADMIN — PIPERACILLIN SODIUM AND TAZOBACTAM SODIUM 2.25 G: 2; .25 INJECTION, SOLUTION INTRAVENOUS at 17:30

## 2024-07-20 RX ADMIN — VANCOMYCIN HYDROCHLORIDE 1000 MG: 1 INJECTION, SOLUTION INTRAVENOUS at 12:55

## 2024-07-20 RX ADMIN — DEXTROSE MONOHYDRATE 12.5 G: 25 INJECTION, SOLUTION INTRAVENOUS at 13:42

## 2024-07-20 RX ADMIN — HEPARIN SODIUM 5000 UNITS: 5000 INJECTION INTRAVENOUS; SUBCUTANEOUS at 17:30

## 2024-07-20 RX ADMIN — HEPARIN SODIUM 5000 UNITS: 5000 INJECTION INTRAVENOUS; SUBCUTANEOUS at 23:29

## 2024-07-20 RX ADMIN — NYSTATIN 400000 UNITS: 100000 SUSPENSION ORAL at 09:32

## 2024-07-20 RX ADMIN — ROSUVASTATIN CALCIUM 10 MG: 10 TABLET, FILM COATED ORAL at 23:32

## 2024-07-20 ASSESSMENT — COGNITIVE AND FUNCTIONAL STATUS - GENERAL
MOVING FROM LYING ON BACK TO SITTING ON SIDE OF FLAT BED WITH BEDRAILS: TOTAL
MOVING TO AND FROM BED TO CHAIR: TOTAL
MOVING FROM LYING ON BACK TO SITTING ON SIDE OF FLAT BED WITH BEDRAILS: A LOT
TOILETING: TOTAL
DRESSING REGULAR LOWER BODY CLOTHING: TOTAL
MOBILITY SCORE: 7
TURNING FROM BACK TO SIDE WHILE IN FLAT BAD: TOTAL
MOBILITY SCORE: 6
DRESSING REGULAR UPPER BODY CLOTHING: TOTAL
TOILETING: TOTAL
TOILETING: TOTAL
MOVING FROM LYING ON BACK TO SITTING ON SIDE OF FLAT BED WITH BEDRAILS: A LOT
WALKING IN HOSPITAL ROOM: TOTAL
WALKING IN HOSPITAL ROOM: TOTAL
PERSONAL GROOMING: TOTAL
PERSONAL GROOMING: TOTAL
EATING MEALS: TOTAL
EATING MEALS: TOTAL
WALKING IN HOSPITAL ROOM: TOTAL
PERSONAL GROOMING: TOTAL
CLIMB 3 TO 5 STEPS WITH RAILING: TOTAL
DRESSING REGULAR LOWER BODY CLOTHING: TOTAL
TURNING FROM BACK TO SIDE WHILE IN FLAT BAD: TOTAL
STANDING UP FROM CHAIR USING ARMS: TOTAL
DRESSING REGULAR LOWER BODY CLOTHING: TOTAL
HELP NEEDED FOR BATHING: TOTAL
CLIMB 3 TO 5 STEPS WITH RAILING: TOTAL
DAILY ACTIVITIY SCORE: 6
STANDING UP FROM CHAIR USING ARMS: TOTAL
EATING MEALS: TOTAL
DAILY ACTIVITIY SCORE: 6
HELP NEEDED FOR BATHING: TOTAL
MOVING TO AND FROM BED TO CHAIR: TOTAL
STANDING UP FROM CHAIR USING ARMS: TOTAL
MOVING TO AND FROM BED TO CHAIR: TOTAL
DRESSING REGULAR UPPER BODY CLOTHING: TOTAL
CLIMB 3 TO 5 STEPS WITH RAILING: TOTAL
HELP NEEDED FOR BATHING: TOTAL
MOBILITY SCORE: 7
DAILY ACTIVITIY SCORE: 6
TURNING FROM BACK TO SIDE WHILE IN FLAT BAD: TOTAL
DRESSING REGULAR UPPER BODY CLOTHING: TOTAL

## 2024-07-20 ASSESSMENT — PAIN SCALES - PAIN ASSESSMENT IN ADVANCED DEMENTIA (PAINAD)
CONSOLABILITY: NO NEED TO CONSOLE
TOTALSCORE: 1
TOTALSCORE: 1
NEGVOCALIZATION: OCCASIONAL MOAN/GROAN, LOW SPEECH, NEGATIVE/DISAPPROVING QUALITY
BODYLANGUAGE: RELAXED
FACIALEXPRESSION: SMILING OR INEXPRESSIVE
CONSOLABILITY: NO NEED TO CONSOLE
TOTALSCORE: REPOSITIONED
NEGVOCALIZATION: OCCASIONAL MOAN/GROAN, LOW SPEECH, NEGATIVE/DISAPPROVING QUALITY
BREATHING: NORMAL
BODYLANGUAGE: RELAXED
FACIALEXPRESSION: SMILING OR INEXPRESSIVE
BREATHING: NORMAL

## 2024-07-20 ASSESSMENT — PAIN - FUNCTIONAL ASSESSMENT: PAIN_FUNCTIONAL_ASSESSMENT: PAINAD (PAIN ASSESSMENT IN ADVANCED DEMENTIA SCALE)

## 2024-07-20 NOTE — SIGNIFICANT EVENT
Met with son, Bird (mPOA) and daughter in law.     Son and family are frustrated by the care he received in [place].  He states that his father was living in [place] and overall doing pretty well.  He was able to perform ADLs and would go out to eat with his grandson. He carried diagnosis of mild dementia however was overall functioning well.  He was diagnosed with lung cancer and palliative treatment options were offered.  He was not a candidate for curative treatment because of comorbidities and risks associated with surgery/more aggressive treatment.  He underwent his first infusion for chemo/immunotherapy about 2 months ago and the following day he was admitted to the hospital for sepsis secondary to a UTI.  After leaving the hospital he was discharged to assisted living facility where he continued to decline and had another hospital admission.  Ultimately, family was upset with the care he was receiving there and drove him back to [place].  They got back to [place] to [facility] on 7/12/2024 and noticed he was having difficulty breathing the following day which prompted 911 call and current hospitalization.  Patient's family was upset regarding his lack of nutrition at the assisted living facility in [place] and have noticed improvement in his mentation since starting nutrition here.      Based on current goals of care, plan would be to replace NG for ongoing tube feeds and give the patient more time to respond to nutritional support.  They feel that if he does not make more progress then this will allow them to make a more informed decision on Tuesday when they meet with hospice.    We discussed current treatment approach.  Bird has discussed CODE STATUS with his brother.      We will update CODE STATUS to DNR, DNI, no ICU.    Vanessa Giordano, DO

## 2024-07-20 NOTE — PROGRESS NOTES
Delmar Casas is a 73 y.o. male on day 7 of admission presenting with Respiratory failure with hypoxia, unspecified chronicity (Multi).    Subjective   Patient was Aox0 this morning. He was not localizing to person during the exam. Also not vocal at time of exam. His son was later contacted. I spoke with the son over the phone and in person. His son notes that oncology's treatment plan was palliative as the pt was not able to undergo surgery due to his heart. He also states that since the pt has been in the hospital his mental status has deteriorated. His son says the pt is hard to engage during the mornings but is a little easier to talk to in the evening. The pt has a hospice meeting scheduled for Tuesday since that is the time both of his son's are available.       Objective     Last Recorded Vitals  /69 (BP Location: Right arm, Patient Position: Lying)   Pulse 83   Temp 36.5 °C (97.7 °F) (Temporal)   Resp 20   Wt 65.3 kg (143 lb 15.4 oz)   SpO2 95%   Intake/Output last 3 Shifts:    Intake/Output Summary (Last 24 hours) at 7/20/2024 1238  Last data filed at 7/20/2024 0828  Gross per 24 hour   Intake 1201 ml   Output 840 ml   Net 361 ml       Admission Weight  Weight: 77.1 kg (170 lb) (07/13/24 1830)    Daily Weight  07/20/24 : 65.3 kg (143 lb 15.4 oz)    Image Results  ECG 12 Lead  Sinus tachycardia with Premature supraventricular complexes  ST & T wave abnormality, consider inferior ischemia  Abnormal ECG  No previous ECGs available  See ED provider note for full interpretation and clinical correlation  Confirmed by Rica Yousif ([id number]) on 7/16/2024 1:41:10 PM  XR chest 1 view  Narrative: Interpreted By:  Malou Jaquez,   STUDY:  XR CHEST 1 VIEW;  7/16/2024 11:41 am      INDICATION:  Signs/Symptoms:NG placement.      COMPARISON:  None.      ACCESSION NUMBER(S):  [id number]      ORDERING CLINICIAN:  STEFAN DURANT      TECHNIQUE:  2 AP portable images of the chest were obtained.       FINDINGS:  The heart is normal in size.      There is no obvious consolidation or pleural fluid.      A port is present on the left with the tip in the region of the  superior vena cava.      There are atherosclerotic changes of the aorta.      A nasogastric tube terminates in the region of the stomach.      There are degenerative changes of the spine      COMPARISON OF FINDING:      Impression: Nasogastric tube with the tip in the region of the stomach.      No acute cardiopulmonary disease.      MACRO:  none      Signed by: Malou Jaquez 7/16/2024 1:06 PM  Dictation workstation:   [id number]      Physical Exam  Constitutional:       Appearance: He is toxic-appearing.   HENT:      Head: Normocephalic.   Cardiovascular:      Rate and Rhythm: Normal rate and regular rhythm.      Pulses: Normal pulses.      Heart sounds: Normal heart sounds.   Pulmonary:      Effort: Pulmonary effort is normal.      Breath sounds: Wheezing present.   Abdominal:      General: Abdomen is flat. Bowel sounds are normal.      Palpations: Abdomen is soft.   Skin:     General: Skin is warm.   Neurological:      Mental Status: He is disoriented.   Psychiatric:      Comments: A&Ox0         Relevant Results      Scheduled medications  aspirin, 81 mg, oral, Daily  heparin (porcine), 5,000 Units, subcutaneous, q8h  insulin lispro, 0-5 Units, subcutaneous, q6h  nystatin, 4 mL, Swish & Swallow, TID  oxygen, , inhalation, Continuous - Inhalation  pantoprazole, 40 mg, intravenous, Daily  piperacillin-tazobactam, 2.25 g, intravenous, q6h  rosuvastatin, 10 mg, oral, Daily  [Held by provider] senna, 10 mL, oral, BID  [Held by provider] tamsulosin, 0.4 mg, oral, Nightly  tiotropium, 2 puff, inhalation, Daily  vancomycin, 1,000 mg, intravenous, q24h      Continuous medications     PRN medications  PRN medications: acetaminophen **OR** acetaminophen **OR** acetaminophen, albuterol, bisacodyl, dextrose, glucagon, moisturizing mouth, vancomycin  Results  for orders placed or performed during the hospital encounter of 07/13/24 (from the past 24 hour(s))   POCT GLUCOSE   Result Value Ref Range    POCT Glucose 103 (H) 74 - 99 mg/dL   POCT GLUCOSE   Result Value Ref Range    POCT Glucose 108 (H) 74 - 99 mg/dL   CBC   Result Value Ref Range    WBC 13.3 (H) 4.4 - 11.3 x10*3/uL    nRBC 0.0 0.0 - 0.0 /100 WBCs    RBC 3.83 (L) 4.50 - 5.90 x10*6/uL    Hemoglobin 11.1 (L) 13.5 - 17.5 g/dL    Hematocrit 35.4 (L) 41.0 - 52.0 %    MCV 92 80 - 100 fL    MCH 29.0 26.0 - 34.0 pg    MCHC 31.4 (L) 32.0 - 36.0 g/dL    RDW 18.3 (H) 11.5 - 14.5 %    Platelets 182 150 - 450 x10*3/uL   Renal function panel   Result Value Ref Range    Glucose 118 (H) 74 - 99 mg/dL    Sodium 142 136 - 145 mmol/L    Potassium 4.2 3.5 - 5.3 mmol/L    Chloride 109 (H) 98 - 107 mmol/L    Bicarbonate 27 21 - 32 mmol/L    Anion Gap 10 10 - 20 mmol/L    Urea Nitrogen 28 (H) 6 - 23 mg/dL    Creatinine 1.22 0.50 - 1.30 mg/dL    eGFR 62 >60 mL/min/1.73m*2    Calcium 7.9 (L) 8.6 - 10.3 mg/dL    Phosphorus 2.9 2.5 - 4.9 mg/dL    Albumin 2.4 (L) 3.4 - 5.0 g/dL   Magnesium   Result Value Ref Range    Magnesium 1.77 1.60 - 2.40 mg/dL   POCT GLUCOSE   Result Value Ref Range    POCT Glucose 99 74 - 99 mg/dL     ECG 12 Lead    Result Date: 7/16/2024  Sinus tachycardia with Premature supraventricular complexes ST & T wave abnormality, consider inferior ischemia Abnormal ECG No previous ECGs available See ED provider note for full interpretation and clinical correlation Confirmed by Rica Yousif ([id number]) on 7/16/2024 1:41:10 PM    XR chest 1 view    Result Date: 7/16/2024  Interpreted By:  Malou Jaquez, STUDY: XR CHEST 1 VIEW;  7/16/2024 11:41 am   INDICATION: Signs/Symptoms:NG placement.   COMPARISON: None.   ACCESSION NUMBER(S): [id number]   ORDERING CLINICIAN: STEFAN DURANT   TECHNIQUE: 2 AP portable images of the chest were obtained.   FINDINGS: The heart is normal in size.   There is no obvious consolidation or  pleural fluid.   A port is present on the left with the tip in the region of the superior vena cava.   There are atherosclerotic changes of the aorta.   A nasogastric tube terminates in the region of the stomach.   There are degenerative changes of the spine   COMPARISON OF FINDING:       Nasogastric tube with the tip in the region of the stomach.   No acute cardiopulmonary disease.   MACRO: none   Signed by: Malou Jaquez 7/16/2024 1:06 PM Dictation workstation:   [id number]    ECG 12 lead    Result Date: 7/15/2024  Accelerated Junctional rhythm Low voltage QRS Nonspecific T wave abnormality Abnormal ECG When compared with ECG of 13-JUL-2024 18:20, (unconfirmed) Junctional rhythm has replaced Sinus rhythm Vent. rate has decreased BY  44 BPM Non-specific change in ST segment in Inferior leads ST no longer depressed in Lateral leads Nonspecific T wave abnormality has replaced inverted T waves in Inferior leads    CT angio chest for pulmonary embolism    Result Date: 7/14/2024  Interpreted By:  Lux Palacio, STUDY: CT ANGIO CHEST FOR PULMONARY EMBOLISM;  7/14/2024 2:39 am   INDICATION: Signs/Symptoms:to exclude PE.   COMPARISON: 7/13/2024   ACCESSION NUMBER(S): [id number]   ORDERING CLINICIAN: POOJA DARBY   TECHNIQUE: Contiguous axial images of the chest were obtained after the intravenous administration of  contrast. Coronal and sagittal reformatted images were obtained from the axial images. MIPS of the chest were also performed and reviewed.   FINDINGS: The examination is limited secondary to motion and beam hardening artifact secondary to inferior position of the patient's arms.   No axillary lymphadenopathy. 1.6 cm subcarinal lymph node. The heart is normal in size. Coronary artery atherosclerotic calcifications. No significant pericardial effusion. No evidence of acute central, main, lobar or proximal segmental pulmonary embolism.   Evaluation of the lungs is limited secondary to respiratory motion. There  is redemonstration of masslike opacity in the medial right lung abutting the mediastinum which measures 4 cm x 1.5 cm in greatest axial dimensions. There nodular in size opacities posterior and superior to this opacity. There are mild consolidative opacity in the right lower lobe and in the left upper lobe and lingula compatible with pneumonia. Small nodular opacities also again noted.   Limited evaluation of the upper abdomen.   Multilevel degenerative change of the thoracic spine.       No evidence of acute pulmonary embolism.   Redemonstration of masslike opacity in the medial right upper lung abutting the mediastinum which measures 4 cm x 1.5 cm underlying malignancy is not excluded and comparison with outside prior imaging recommended and attention on short-term progress imaging.   Nodular opacities are again noted posterior and superior to the described masslike opacity. There is interval development of mild consolidative opacities in the right lower lobe and also in the left upper lobe and lingula compatible with pneumonia. There is also minimal opacity in the medial left lower lobe. Small nodular opacities are also again noted. Short-term progress imaging recommended for further evaluation.   MACRO: None   Signed by: Lux Palacio 7/14/2024 3:33 AM Dictation workstation:   [id number]    Lower extremity venous duplex bilateral    Result Date: 7/14/2024  Interpreted By:  Lux Palacio, STUDY: [facility] US LOWER EXTREMITY VENOUS DUPLEX BILATERAL;  7/14/2024 1:32 am   INDICATION: Signs/Symptoms:High D-dimer, history of lung cancer with CT, COVID-positive, on Airvo.   COMPARISON: None.   ACCESSION NUMBER(S): [id number]   ORDERING CLINICIAN: POOJA DARBY   TECHNIQUE: Vascular ultrasound of the bilateral lower extremities was performed. Real-time compression views as well as Gray scale, color Doppler and spectral Doppler waveform analysis was performed.   FINDINGS: Evaluation of the visualized portions of the  bilateral common femoral vein, proximal, mid, and distal femoral vein, and popliteal vein were performed.  Evaluation of the visualized portions of the  posterior tibial and peroneal veins were also performed.   The evaluated veins demonstrate normal compressibility. There is intact venous flow demonstrating normal respiratory variability and normal augmentation of flow with calf compression. Therefore, there is no ultrasonographic evidence for deep vein thrombosis within the evaluated veins.       No sonographic evidence for deep vein thrombosis within the evaluated veins of the bilateral lower extremity.   MACRO: None   Signed by: Lux Palacio 7/14/2024 1:33 AM Dictation workstation:   [id number]    CT head wo IV contrast    Result Date: 7/13/2024  Interpreted By:  Segun Ford, STUDY: CT HEAD WO IV CONTRAST;  7/13/2024 8:22 pm   INDICATION: Signs/Symptoms:AMS.   COMPARISON: None.   ACCESSION NUMBER(S): [id number]   ORDERING CLINICIAN: RYAN CALVILLO   TECHNIQUE: Noncontrast axial CT scan of head was performed.   FINDINGS: Parenchyma: There is no intracranial hemorrhage. The grey-white differentiation is intact. There is no mass effect or midline shift. Encephalomalacia left occipital lobe from prior infarct. Superimposed confluent supratentorial hypodensity, nonspecific, but likely secondary to chronic microvascular ischemia.   CSF Spaces: Mild generalized volume loss with concordant ventriculomegaly.   Extra-Axial Fluid: There is no extraaxial fluid collection.   Calvarium: The calvarium is unremarkable.   Paranasal sinuses: Moderate to severe diffuse paranasal sinus mucosal thickening.   Mastoids: Clear.   Orbits: Bilateral lens replacements   Soft tissues: Unremarkable.       No acute intracranial hemorrhage, mass effect, or CT apparent acute infarct. Chronic microvascular ischemia, sequela of prior left occipital lobe infarct and involutional changes.   Signed by: Segun Ford 7/13/2024 8:57 PM  Dictation workstation:   [id number]    CT chest wo IV contrast    Result Date: 7/13/2024  Interpreted By:  Cristiano Brown, STUDY: CT CHEST WO IV CONTRAST;  7/13/2024 6:39 pm   INDICATION: Signs/Symptoms:Coarse breath sounds heard bilateral possible CHF versus pneumonia.   COMPARISON: None.   ACCESSION NUMBER(S): [id number]   ORDERING CLINICIAN: RYAN CALVILLO   TECHNIQUE: Axial CT images of the chest obtained without contrast.   FINDINGS: BONES: No acute osseous abnormality. Diffuse osseous demineralization. Chronic healed rib fracture deformities. LOWER NECK: Unremarkable. CHEST WALL: Left chest port with catheter tip in the mid SVC. UPPER ABDOMEN: Left adrenal gland nodule measuring 2.5 x 2.7 cm with internal Hounsfield unit of 24, indeterminate atrophic kidneys with vascular calcifications, likely chronic medical renal disease. Mildly distended gallbladder but no surrounding inflammation. Questionable nodular contour of the liver parenchyma raising suspicion for underlying liver disease.   VESSELS: Calcification of the aortic arch and its branches. No aneurysm. HEART: Normal size. Severe coronary atherosclerosis. There is calcification of the mitral annulus, aortic annulus, and aortic valve. Metallic density within the left ventricle, difficult to characterize and localize in the absence of contrast. MEDIASTINUM AND EJNELLE: No pathologically enlarged lymph nodes. LUNGS AND LARGE AIRWAYS: There is bronchiectasis, consolidation, and scarring in the right upper lobe with a linear border favoring prior right upper lobe radiation. Scattered tree-in-bud opacities throughout the bilateral lower lobes, left upper lobe, and lingula are present. PLEURA: No pleural effusion or pneumothorax.       Tree-in-bud opacities throughout the lungs is suspicious for bronchopneumonia.   Extensive coronary atherosclerosis is present but no evidence of cardiomegaly, pulmonary edema, or pleural effusions.   Right upper lobe  bronchiectasis and scarred consolidation are noted which can be seen in the setting of prior right upper lobe radiation treatment. If patient has prior lung malignancy treated with radiation, then recommend comparing with prior oncologic exams to ensure stability. If patient does not have prior radiation treatment, then recommend pulmonology consultation and further characterization with PET-CT.   Left adrenal gland nodule measuring 2.7 cm is indeterminate. If outside imaging is available, recommend comparison. Otherwise, recommend characterization with adrenal protocol CT.   Subtle nodular contour of the liver parenchyma raises suspicion for underlying liver disease or early cirrhosis.     MACRO: Critical Finding:  See findings. Notification was initiated on 7/13/2024 at 7:19 pm by  Cristiano Brown.  (**-YCF-**) Instructions:   Signed by: Cristiano Brown 7/13/2024 7:20 PM Dictation workstation:   [id number]            Assessment/Plan      Principal Problem:    Respiratory failure with hypoxia, unspecified chronicity (Multi)  Active Problems:    Lung cancer (Multi)    SANGEETHA Casas is a 73 y.o. male presenting with Respiratory failure with hypoxia, unspecified chronicity (Multi).    Acute conditions  # Acute metabolic encephalopathy 2/2 PNA  - Still confused, A & O x 0, holding hydroxyzine and Ativan  -Fall precautions     # AHRF 2/2 HAP  # Multifocal pneumonia c  # COVID-positive   -Currently on room air  -CT chest positive for bronchopneumonia, MRSA positive.   - S/p Azithro, continue Vanc + Zosyn until 7/20   - S/p Remdesivir, taper Dex 2mg 7/19 and 1mg on 7/20  - On Spiriva Respimat daily and albuterol q6h PRN  - ICS, Acapella, chest PT   - Aspiration precautions     # Oropharyngeal dysphagia with decreased p.o. intake   - NPO  - Aspiration precautions  - On bowel regimen for constipation  - Continue Protonix 40mg IV daily   - No NG or PEG in place. Poor candidate due to AMS. Hospice discussing goals of  care Tuesday     # Recurrent UTI:   - Urine culture (7/13/24): MRSA. On vanc  - Had Pickett for 2 months, was placed during last hospital stay in [place]. Exchanged 7/14, removed 7/18      # Chronic conditions  # CAD &  HLD: Continue aspirin 81mg , rosuvastatin 10 mg  # BPH:  Holding Tamsulosin 0.4mg (cannot get via NGT)   # Liver nodule (possible cirrhosis/liver disease) : monitor  # Hx of anemia: holding po iron  # Mild hyperglycemia : 2/2 5% dextrose, No history of DM, On NPO SSI     Fluid: FWF @ 300 every 4 hrs   Electrolytes: Replete PRN  Nutrition: Jevity 1.5 @ 50cc/hr    GI ppx: PPI   DVT/PE ppx: Heparin   Abx: Vanc + Zosyn   Lines/Tubes/Drains: Left chest Mediport, left sided midline (from prior to admission), PIV, NGT, Pickett removed 7/19  Oxygen: RA   Code: Full code         Damian Wick DO

## 2024-07-20 NOTE — CONSULTS
"General Surgery History and Physical    Referring Provider:  Patricia Carmona MD  No ref. provider found     Chief Complaint:  Chief Complaint   Patient presents with    Respiratory Distress     Per ems \" called for a rapid decline in pt status\" pt presents with a room air spo2 of 66 squad placed pt on a non rebreather at 15lpm.  Pt was just brought to [facility] from [place] via family, and has been ill        History of Present Illness:  Delmar Casas is a 73 y.o. male with    past medical history of malignant neoplasm of unspecified part of right bronchus, s/p CT and immunotherapy(1 month ago), sepsis, recurrent UTI, CKD stage IV, oropharyngeal dysphagia, asthma (no home oxygen) , BPH, hematuria, hypokalemia, CAD, anemia, neutropenia, HLD, early dementia, mood disturbance, anxiety   Admitted for AHRF 2/2 PNA, COVID positive, severe hypernatremia and LUPILLO on CKD.   SLP 7/19 recommended NPO with small pleasure feedings of pureed/honey thick as tolerated   Was on TF via NGT   NGT got pulled out by accident yesterday with restraints   Surgery was consulted for PEG tube placement           Past Medical History:  History reviewed. No pertinent past medical history.     Past Surgical History:  History reviewed. No pertinent surgical history.     Medications:  Current Outpatient Medications   Medication Instructions    acetaminophen (TYLENOL) 650 mg, oral, Every 6 hours PRN    aspirin 81 mg, oral, Daily    bisacodyl (DULCOLAX (BISACODYL)) 10 mg, rectal, Daily PRN    ferrous fumarate 324 mg (106 mg iron) tablet 1 tablet, oral, Daily    hydrOXYzine HCL (ATARAX) 25 mg, oral, Every 4 hours PRN    LORazepam (ATIVAN) 0.5 mg, oral, Every 12 hours PRN    magnesium hydroxide (Milk of Magnesia) 400 mg/5 mL suspension 30 mL, oral, Daily PRN    mirtazapine (REMERON) 7.5 mg, oral, Nightly    potassium chloride CR 20 mEq ER tablet 20 mEq, oral, 2 times daily, Do not crush or chew.    rosuvastatin (CRESTOR) 10 mg, oral, " Daily    sodium phosphates (Fleet Enema) 19-7 gram/118 mL enema enema 1 enema, rectal, Daily    tamsulosin (FLOMAX) 0.4 mg, oral, Nightly        Allergies:  No Known Allergies     Family History:  No family history on file.     Social History:  Social History     Socioeconomic History    Marital status:    Tobacco Use    Smoking status: Former     Current packs/day: 2.00     Types: Cigarettes   Vaping Use    Vaping status: Unknown     Social Determinants of Health     Financial Resource Strain: Low Risk  (7/14/2024)    Overall Financial Resource Strain (CARDIA)     Difficulty of Paying Living Expenses: Not hard at all   Transportation Needs: No Transportation Needs (7/14/2024)    PRAPARE - Transportation     Lack of Transportation (Medical): No     Lack of Transportation (Non-Medical): No   Housing Stability: Low Risk  (7/14/2024)    Housing Stability Vital Sign     Unable to Pay for Housing in the Last Year: No     Number of Times Moved in the Last Year: 2     Homeless in the Last Year: No        Review of Systems:  A complete 12 point review of systems was performed and is negative except as noted in the history of present illness.      Vital Signs:  Vitals:    07/20/24 0438   BP: 131/69   Pulse: 83   Resp: 20   Temp: 36.5 °C (97.7 °F)   SpO2: 95%      Body mass index is 20.66 kg/m².    Physical Exam:  General: ill   Neuro: confused.   Head: Atraumatic  Eyes: Pupils equal reactive to light. Extraocular motions intact.  Ears: Hears normal speaking voice.  Mouth, Nose, Throat: Mucous membranes moist.  Normal dentition.  Neck: Supple. No visible masses.  Breast: not examined.   Chest: No crepitus.   Heart: Regular rate and rhythm.  Lung: Patent airway. Breathing not labored.   Vascular: Palpable radial pulses bilaterally.  Abdomen: Soft. Nondistended. Nontender.    Rectal: Not examined.   Genitourinary: Not examined.   Musculoskeletal: Moves all extremities.  Normal range of motion.  Extremities: No edema or  cyanosis.   Lymphatic: No palpable lymph nodes.  Skin: No rashes or lesions.  Psychological: Normal affect      Laboratory Values:  CBC:   Lab Results   Component Value Date    WBC 13.3 (H) 07/20/2024    RBC 3.83 (L) 07/20/2024    HGB 11.1 (L) 07/20/2024    HCT 35.4 (L) 07/20/2024     07/20/2024       RFP:   Lab Results   Component Value Date     07/20/2024    K 4.2 07/20/2024     (H) 07/20/2024    CO2 27 07/20/2024    BUN 28 (H) 07/20/2024    CREATININE 1.22 07/20/2024    CALCIUM 7.9 (L) 07/20/2024    MG 1.77 07/20/2024    PHOS 2.9 07/20/2024        LFTs:   Lab Results   Component Value Date    ALBUMIN 2.4 (L) 07/20/2024            Imaging:  I have personally reviewed the images and the radiologist's report.  ECG 12 Lead    Result Date: 7/16/2024  Sinus tachycardia with Premature supraventricular complexes ST & T wave abnormality, consider inferior ischemia Abnormal ECG No previous ECGs available See ED provider note for full interpretation and clinical correlation Confirmed by Rica Yousif ([id number]) on 7/16/2024 1:41:10 PM    XR chest 1 view    Result Date: 7/16/2024  Interpreted By:  Malou Jaquez, STUDY: XR CHEST 1 VIEW;  7/16/2024 11:41 am   INDICATION: Signs/Symptoms:NG placement.   COMPARISON: None.   ACCESSION NUMBER(S): [id number]   ORDERING CLINICIAN: STEFAN DURANT   TECHNIQUE: 2 AP portable images of the chest were obtained.   FINDINGS: The heart is normal in size.   There is no obvious consolidation or pleural fluid.   A port is present on the left with the tip in the region of the superior vena cava.   There are atherosclerotic changes of the aorta.   A nasogastric tube terminates in the region of the stomach.   There are degenerative changes of the spine   COMPARISON OF FINDING:       Nasogastric tube with the tip in the region of the stomach.   No acute cardiopulmonary disease.   MACRO: none   Signed by: Malou Jaquez 7/16/2024 1:06 PM Dictation workstation:    [id number]    ECG 12 lead    Result Date: 7/15/2024  Accelerated Junctional rhythm Low voltage QRS Nonspecific T wave abnormality Abnormal ECG When compared with ECG of 13-JUL-2024 18:20, (unconfirmed) Junctional rhythm has replaced Sinus rhythm Vent. rate has decreased BY  44 BPM Non-specific change in ST segment in Inferior leads ST no longer depressed in Lateral leads Nonspecific T wave abnormality has replaced inverted T waves in Inferior leads    CT angio chest for pulmonary embolism    Result Date: 7/14/2024  Interpreted By:  Lux Palacio, STUDY: CT ANGIO CHEST FOR PULMONARY EMBOLISM;  7/14/2024 2:39 am   INDICATION: Signs/Symptoms:to exclude PE.   COMPARISON: 7/13/2024   ACCESSION NUMBER(S): [id number]   ORDERING CLINICIAN: POOJA DARBY   TECHNIQUE: Contiguous axial images of the chest were obtained after the intravenous administration of  contrast. Coronal and sagittal reformatted images were obtained from the axial images. MIPS of the chest were also performed and reviewed.   FINDINGS: The examination is limited secondary to motion and beam hardening artifact secondary to inferior position of the patient's arms.   No axillary lymphadenopathy. 1.6 cm subcarinal lymph node. The heart is normal in size. Coronary artery atherosclerotic calcifications. No significant pericardial effusion. No evidence of acute central, main, lobar or proximal segmental pulmonary embolism.   Evaluation of the lungs is limited secondary to respiratory motion. There is redemonstration of masslike opacity in the medial right lung abutting the mediastinum which measures 4 cm x 1.5 cm in greatest axial dimensions. There nodular in size opacities posterior and superior to this opacity. There are mild consolidative opacity in the right lower lobe and in the left upper lobe and lingula compatible with pneumonia. Small nodular opacities also again noted.   Limited evaluation of the upper abdomen.   Multilevel degenerative change of  the thoracic spine.       No evidence of acute pulmonary embolism.   Redemonstration of masslike opacity in the medial right upper lung abutting the mediastinum which measures 4 cm x 1.5 cm underlying malignancy is not excluded and comparison with outside prior imaging recommended and attention on short-term progress imaging.   Nodular opacities are again noted posterior and superior to the described masslike opacity. There is interval development of mild consolidative opacities in the right lower lobe and also in the left upper lobe and lingula compatible with pneumonia. There is also minimal opacity in the medial left lower lobe. Small nodular opacities are also again noted. Short-term progress imaging recommended for further evaluation.   MACRO: None   Signed by: Lux Palacio 7/14/2024 3:33 AM Dictation workstation:   [id number]    Lower extremity venous duplex bilateral    Result Date: 7/14/2024  Interpreted By:  Lux Palacio, STUDY: [facility] US LOWER EXTREMITY VENOUS DUPLEX BILATERAL;  7/14/2024 1:32 am   INDICATION: Signs/Symptoms:High D-dimer, history of lung cancer with CT, COVID-positive, on Airvo.   COMPARISON: None.   ACCESSION NUMBER(S): [id number]   ORDERING CLINICIAN: POOJA DARBY   TECHNIQUE: Vascular ultrasound of the bilateral lower extremities was performed. Real-time compression views as well as Gray scale, color Doppler and spectral Doppler waveform analysis was performed.   FINDINGS: Evaluation of the visualized portions of the bilateral common femoral vein, proximal, mid, and distal femoral vein, and popliteal vein were performed.  Evaluation of the visualized portions of the  posterior tibial and peroneal veins were also performed.   The evaluated veins demonstrate normal compressibility. There is intact venous flow demonstrating normal respiratory variability and normal augmentation of flow with calf compression. Therefore, there is no ultrasonographic evidence for deep vein thrombosis  within the evaluated veins.       No sonographic evidence for deep vein thrombosis within the evaluated veins of the bilateral lower extremity.   MACRO: None   Signed by: Lux Palacio 7/14/2024 1:33 AM Dictation workstation:   [id number]    CT head wo IV contrast    Result Date: 7/13/2024  Interpreted By:  Segun Ford, STUDY: CT HEAD WO IV CONTRAST;  7/13/2024 8:22 pm   INDICATION: Signs/Symptoms:AMS.   COMPARISON: None.   ACCESSION NUMBER(S): [id number]   ORDERING CLINICIAN: RYAN CALVILLO   TECHNIQUE: Noncontrast axial CT scan of head was performed.   FINDINGS: Parenchyma: There is no intracranial hemorrhage. The grey-white differentiation is intact. There is no mass effect or midline shift. Encephalomalacia left occipital lobe from prior infarct. Superimposed confluent supratentorial hypodensity, nonspecific, but likely secondary to chronic microvascular ischemia.   CSF Spaces: Mild generalized volume loss with concordant ventriculomegaly.   Extra-Axial Fluid: There is no extraaxial fluid collection.   Calvarium: The calvarium is unremarkable.   Paranasal sinuses: Moderate to severe diffuse paranasal sinus mucosal thickening.   Mastoids: Clear.   Orbits: Bilateral lens replacements   Soft tissues: Unremarkable.       No acute intracranial hemorrhage, mass effect, or CT apparent acute infarct. Chronic microvascular ischemia, sequela of prior left occipital lobe infarct and involutional changes.   Signed by: Segun Ford 7/13/2024 8:57 PM Dictation workstation:   [id number]    CT chest wo IV contrast    Result Date: 7/13/2024  Interpreted By:  Cristiano Brown, STUDY: CT CHEST WO IV CONTRAST;  7/13/2024 6:39 pm   INDICATION: Signs/Symptoms:Coarse breath sounds heard bilateral possible CHF versus pneumonia.   COMPARISON: None.   ACCESSION NUMBER(S): [id number]   ORDERING CLINICIAN: RYAN CALVILLO   TECHNIQUE: Axial CT images of the chest obtained without contrast.   FINDINGS: BONES: No acute osseous  abnormality. Diffuse osseous demineralization. Chronic healed rib fracture deformities. LOWER NECK: Unremarkable. CHEST WALL: Left chest port with catheter tip in the mid SVC. UPPER ABDOMEN: Left adrenal gland nodule measuring 2.5 x 2.7 cm with internal Hounsfield unit of 24, indeterminate atrophic kidneys with vascular calcifications, likely chronic medical renal disease. Mildly distended gallbladder but no surrounding inflammation. Questionable nodular contour of the liver parenchyma raising suspicion for underlying liver disease.   VESSELS: Calcification of the aortic arch and its branches. No aneurysm. HEART: Normal size. Severe coronary atherosclerosis. There is calcification of the mitral annulus, aortic annulus, and aortic valve. Metallic density within the left ventricle, difficult to characterize and localize in the absence of contrast. MEDIASTINUM AND JENELLE: No pathologically enlarged lymph nodes. LUNGS AND LARGE AIRWAYS: There is bronchiectasis, consolidation, and scarring in the right upper lobe with a linear border favoring prior right upper lobe radiation. Scattered tree-in-bud opacities throughout the bilateral lower lobes, left upper lobe, and lingula are present. PLEURA: No pleural effusion or pneumothorax.       Tree-in-bud opacities throughout the lungs is suspicious for bronchopneumonia.   Extensive coronary atherosclerosis is present but no evidence of cardiomegaly, pulmonary edema, or pleural effusions.   Right upper lobe bronchiectasis and scarred consolidation are noted which can be seen in the setting of prior right upper lobe radiation treatment. If patient has prior lung malignancy treated with radiation, then recommend comparing with prior oncologic exams to ensure stability. If patient does not have prior radiation treatment, then recommend pulmonology consultation and further characterization with PET-CT.   Left adrenal gland nodule measuring 2.7 cm is indeterminate. If outside imaging  is available, recommend comparison. Otherwise, recommend characterization with adrenal protocol CT.   Subtle nodular contour of the liver parenchyma raises suspicion for underlying liver disease or early cirrhosis.     MACRO: Critical Finding:  See findings. Notification was initiated on 7/13/2024 at 7:19 pm by  Cristiano Brown.  (**-YCF-**) Instructions:   Signed by: Cristiano Brown 7/13/2024 7:20 PM Dictation workstation:   [id number]        Assessment:  This is a 73 y.o. male who has below conditions:  Multiple comorbidities as above   Poor prognosis per medicine: palliative consulted, hospice decision pending   Dysphagia     Plan:  Supportive care per medicine   Will discuss with family: possible risks of PEG   Goal of care should be discussed       Marlene Barragan MD [facility]  General Surgery  Office: [phone]  Fax:     [phone]  9:14 AM   07/20/24

## 2024-07-20 NOTE — CARE PLAN
The patient's goals for the shift include      The clinical goals for the shift include Pt to remain safe throughout shift    Over the shift, the patient did not make progress toward the following goals. Barriers to progression include  Recommendations to address these barriers include

## 2024-07-20 NOTE — PROGRESS NOTES
Vancomycin Dosing by Pharmacy- Cessation of Therapy    Consult to pharmacy for vancomycin dosing has been discontinued by the prescriber, pharmacy will sign off at this time.    Please call pharmacy if there are further questions or re-enter a consult if vancomycin is resumed.     Lottie Vazquez, PharmD

## 2024-07-20 NOTE — SIGNIFICANT EVENT
Delmar Casas is a 73 y.o. male with past medical history of malignant neoplasm of unspecified part of right bronchus, s/p CT and immunotherapy(1 month ago), sepsis, recurrent UTI, CKD stage IV, oropharyngeal dysphagia, asthma (no home oxygen) , BPH, hematuria, hypokalemia, CAD, anemia, neutropenia, HLD, early dementia, mood disturbance, anxiety admitted to the ICU with AHRF 2/2 PNA, COVID positive, severe hypernatremia and LUPILLO on CKD.   LUPILLO, hypernatremia resolved.     Patient has been transferred from ICU to floor overnight.    Review of system: Patient is mentally altered, did not respond to verbal command, ANO x 0 so.  No review of system of system was collected    Constitutional: patient was seen and examined at the bedside and appeared mildly agitated, was in soft restraints, did not respond when called by name  Eyes: PERRL bilaterally   Head: atraumatic, normocephalic  Heart: RRR, no M/R/G  Lungs: CTA, b/l, no W/R/R  Abdomen: soft, ND, NT, + BS in all 4 quadrants , patient removed his NG tube.  Has external catheter.   Extremities: no edema, ecchymoses, erythema   Neuro: AAOx 0   Psych: Not assessed    Assessment and plan    Acute conditions  # Acute metabolic encephalopathy 2/2 PNA  - Still confused, A & O x 0, holding hydroxyzine and Ativan  -Fall precautions    # AHRF 2/2 HAP  # Multifocal pneumonia comes automatically actually if you have to just ordered that  # COVID-positive  - Pt presented with AHRF requiring 15 L HFNC, later placed on Airvo ,   -Currently on room air  -CT chest positive for bronchopneumonia, MRSA positive.   - S/p Azithro, continue Vanc + Zosyn until 7/20   - S/p Remdesivir, taper Dex 2mg 7/19 and 1mg on 7/20  - On Spiriva Respimat daily and albuterol q6h PRN  - ICS, Acapella, chest PT   - Aspiration precautions    # Oropharyngeal dysphagia with decreased p.o. intake   - NPO, SLP 7/19 recommended NPO with small pleasure feedings of pureed/honey thick as tolerated  - Aspiration  precautions  - On bowel regimen for constipation  - Continue Protonix 40mg IV daily   - NG tube placed 7/16, continue TF and FWF   -Upon coming to the floor patient resumed his NG tube, needs to be replaced again.    - Surgery consulted for PEG tube placement     # Recurrent UTI:   - Urine culture (7/13/24): MRSA   - Nephrology following, appreciate jacque   - Had Pickett for 2 months, was placed during last hospital stay in [place]. Exchanged 7/14, removed 7/18     # Chronic conditions  # CAD &  HLD: Continue aspirin 81mg , rosuvastatin 10 mg  # BPH:  Holding Tamsulosin 0.4mg (cannot get via NGT)   # Liver nodule (possible cirrhosis/liver disease) : monitor  # Hx of anemia: holding po iron  # Mild hyperglycemia : 2/2 5% dextrose, No history of DM, On NPO SSI    Fluid: FWF @ 300 every 4 hrs   Electrolytes: Replete PRN  Nutrition: Jevity 1.5 @ 50cc/hr    GI ppx: PPI   DVT/PE ppx: Heparin   Abx: Vanc + Zosyn   Lines/Tubes/Drains: Left chest Mediport, left sided midline (from prior to admission), PIV, NGT, Pickett removed 7/19  Oxygen: RA   Code: Full code      Dispo:  meeting with [facility] on Tuesday 7/23. Surgery consulted for PEG tube placement.    Leonardo Baltazar MD   Internal Medicine  [id number]

## 2024-07-21 VITALS
RESPIRATION RATE: 14 BRPM | BODY MASS INDEX: 22 KG/M2 | OXYGEN SATURATION: 90 % | DIASTOLIC BLOOD PRESSURE: 55 MMHG | HEIGHT: 70 IN | TEMPERATURE: 97 F | SYSTOLIC BLOOD PRESSURE: 99 MMHG | WEIGHT: 153.66 LBS | HEART RATE: 90 BPM

## 2024-07-21 LAB
ALBUMIN SERPL BCP-MCNC: 2.3 G/DL (ref 3.4–5)
ANION GAP SERPL CALC-SCNC: 11 MMOL/L (ref 10–20)
BUN SERPL-MCNC: 25 MG/DL (ref 6–23)
CALCIUM SERPL-MCNC: 7.8 MG/DL (ref 8.6–10.3)
CHLORIDE SERPL-SCNC: 108 MMOL/L (ref 98–107)
CO2 SERPL-SCNC: 27 MMOL/L (ref 21–32)
CREAT SERPL-MCNC: 1.37 MG/DL (ref 0.5–1.3)
EGFRCR SERPLBLD CKD-EPI 2021: 54 ML/MIN/1.73M*2
ERYTHROCYTE [DISTWIDTH] IN BLOOD BY AUTOMATED COUNT: 19 % (ref 11.5–14.5)
GLUCOSE BLD MANUAL STRIP-MCNC: 102 MG/DL (ref 74–99)
GLUCOSE BLD MANUAL STRIP-MCNC: 111 MG/DL (ref 74–99)
GLUCOSE BLD MANUAL STRIP-MCNC: 117 MG/DL (ref 74–99)
GLUCOSE BLD MANUAL STRIP-MCNC: 99 MG/DL (ref 74–99)
GLUCOSE SERPL-MCNC: 101 MG/DL (ref 74–99)
HCT VFR BLD AUTO: 33 % (ref 41–52)
HGB BLD-MCNC: 10 G/DL (ref 13.5–17.5)
MAGNESIUM SERPL-MCNC: 1.84 MG/DL (ref 1.6–2.4)
MCH RBC QN AUTO: 28.7 PG (ref 26–34)
MCHC RBC AUTO-ENTMCNC: 30.3 G/DL (ref 32–36)
MCV RBC AUTO: 95 FL (ref 80–100)
NRBC BLD-RTO: 0 /100 WBCS (ref 0–0)
PHOSPHATE SERPL-MCNC: 3 MG/DL (ref 2.5–4.9)
PLATELET # BLD AUTO: 181 X10*3/UL (ref 150–450)
POTASSIUM SERPL-SCNC: 3.5 MMOL/L (ref 3.5–5.3)
RBC # BLD AUTO: 3.48 X10*6/UL (ref 4.5–5.9)
SODIUM SERPL-SCNC: 142 MMOL/L (ref 136–145)
WBC # BLD AUTO: 12 X10*3/UL (ref 4.4–11.3)

## 2024-07-21 PROCEDURE — 80069 RENAL FUNCTION PANEL: CPT | Performed by: INTERNAL MEDICINE

## 2024-07-21 PROCEDURE — C9113 INJ PANTOPRAZOLE SODIUM, VIA: HCPCS | Performed by: INTERNAL MEDICINE

## 2024-07-21 PROCEDURE — 2500000001 HC RX 250 WO HCPCS SELF ADMINISTERED DRUGS (ALT 637 FOR MEDICARE OP)

## 2024-07-21 PROCEDURE — 2500000004 HC RX 250 GENERAL PHARMACY W/ HCPCS (ALT 636 FOR OP/ED): Performed by: INTERNAL MEDICINE

## 2024-07-21 PROCEDURE — 83735 ASSAY OF MAGNESIUM: CPT | Performed by: INTERNAL MEDICINE

## 2024-07-21 PROCEDURE — 99233 SBSQ HOSP IP/OBS HIGH 50: CPT | Performed by: STUDENT IN AN ORGANIZED HEALTH CARE EDUCATION/TRAINING PROGRAM

## 2024-07-21 PROCEDURE — 2500000005 HC RX 250 GENERAL PHARMACY W/O HCPCS: Performed by: INTERNAL MEDICINE

## 2024-07-21 PROCEDURE — 1200000002 HC GENERAL ROOM WITH TELEMETRY DAILY

## 2024-07-21 PROCEDURE — 82947 ASSAY GLUCOSE BLOOD QUANT: CPT

## 2024-07-21 PROCEDURE — 85027 COMPLETE CBC AUTOMATED: CPT | Performed by: INTERNAL MEDICINE

## 2024-07-21 PROCEDURE — 94760 N-INVAS EAR/PLS OXIMETRY 1: CPT

## 2024-07-21 PROCEDURE — 2500000002 HC RX 250 W HCPCS SELF ADMINISTERED DRUGS (ALT 637 FOR MEDICARE OP, ALT 636 FOR OP/ED): Performed by: INTERNAL MEDICINE

## 2024-07-21 PROCEDURE — 99232 SBSQ HOSP IP/OBS MODERATE 35: CPT | Performed by: SURGERY

## 2024-07-21 PROCEDURE — 2500000001 HC RX 250 WO HCPCS SELF ADMINISTERED DRUGS (ALT 637 FOR MEDICARE OP): Performed by: INTERNAL MEDICINE

## 2024-07-21 RX ADMIN — ASPIRIN 81 MG CHEWABLE TABLET 81 MG: 81 TABLET CHEWABLE at 10:28

## 2024-07-21 RX ADMIN — TIOTROPIUM BROMIDE INHALATION SPRAY 2 PUFF: 3.12 SPRAY, METERED RESPIRATORY (INHALATION) at 08:00

## 2024-07-21 RX ADMIN — Medication 1 L/MIN: at 08:00

## 2024-07-21 RX ADMIN — NYSTATIN 400000 UNITS: 100000 SUSPENSION ORAL at 20:22

## 2024-07-21 RX ADMIN — HEPARIN SODIUM 5000 UNITS: 5000 INJECTION INTRAVENOUS; SUBCUTANEOUS at 15:09

## 2024-07-21 RX ADMIN — NYSTATIN 400000 UNITS: 100000 SUSPENSION ORAL at 10:25

## 2024-07-21 RX ADMIN — NYSTATIN 400000 UNITS: 100000 SUSPENSION ORAL at 15:09

## 2024-07-21 RX ADMIN — ROSUVASTATIN CALCIUM 10 MG: 10 TABLET, FILM COATED ORAL at 20:22

## 2024-07-21 RX ADMIN — PANTOPRAZOLE SODIUM 40 MG: 40 INJECTION, POWDER, FOR SOLUTION INTRAVENOUS at 10:38

## 2024-07-21 RX ADMIN — HEPARIN SODIUM 5000 UNITS: 5000 INJECTION INTRAVENOUS; SUBCUTANEOUS at 10:26

## 2024-07-21 RX ADMIN — HEPARIN SODIUM 5000 UNITS: 5000 INJECTION INTRAVENOUS; SUBCUTANEOUS at 23:51

## 2024-07-21 RX ADMIN — Medication 5 MG: at 20:22

## 2024-07-21 RX ADMIN — Medication 1 L/MIN: at 20:00

## 2024-07-21 ASSESSMENT — COGNITIVE AND FUNCTIONAL STATUS - GENERAL
DRESSING REGULAR LOWER BODY CLOTHING: TOTAL
MOVING TO AND FROM BED TO CHAIR: TOTAL
CLIMB 3 TO 5 STEPS WITH RAILING: TOTAL
DRESSING REGULAR UPPER BODY CLOTHING: TOTAL
HELP NEEDED FOR BATHING: TOTAL
DAILY ACTIVITIY SCORE: 6
EATING MEALS: TOTAL
TURNING FROM BACK TO SIDE WHILE IN FLAT BAD: TOTAL
MOVING FROM LYING ON BACK TO SITTING ON SIDE OF FLAT BED WITH BEDRAILS: TOTAL
PERSONAL GROOMING: TOTAL
WALKING IN HOSPITAL ROOM: TOTAL
STANDING UP FROM CHAIR USING ARMS: TOTAL
TOILETING: TOTAL
MOBILITY SCORE: 6

## 2024-07-21 ASSESSMENT — PAIN SCALES - PAIN ASSESSMENT IN ADVANCED DEMENTIA (PAINAD)
TOTALSCORE: 0
BREATHING: NORMAL
BODYLANGUAGE: RELAXED
FACIALEXPRESSION: SMILING OR INEXPRESSIVE
CONSOLABILITY: NO NEED TO CONSOLE

## 2024-07-21 NOTE — PROGRESS NOTES
Delmar Casas is a 73 y.o. male on day 8 of admission presenting with Respiratory failure with hypoxia, unspecified chronicity (Multi).      Subjective   On evaluation near noon today, pt answers some questions follows some commands.   NG tube in place and tube feeds running. Family asked about nasal bridle today.   He says he has R leg pain, though can be passively moved.   On room air          Objective     Last Recorded Vitals  /64 (BP Location: Right arm, Patient Position: Lying)   Pulse 64   Temp 36.5 °C (97.7 °F) (Temporal)   Resp 17   Wt 69.7 kg (153 lb 10.6 oz)   SpO2 91%   Intake/Output last 3 Shifts:    Intake/Output Summary (Last 24 hours) at 7/21/2024 1732  Last data filed at 7/21/2024 0300  Gross per 24 hour   Intake 300 ml   Output 600 ml   Net -300 ml       Admission Weight  Weight: 77.1 kg (170 lb) (07/13/24 1830)    Daily Weight  07/21/24 : 69.7 kg (153 lb 10.6 oz)    Image Results  XR abdomen 1 view  Narrative: Interpreted By:  Ricco Boone,   STUDY:  XR ABDOMEN 1 VIEW;  7/20/2024 5:51 pm      INDICATION:  Signs/Symptoms:Confirm Dobhoff placement.      COMPARISON:  None.      ACCESSION NUMBER(S):  [id number]      ORDERING CLINICIAN:  HEIDI KELLER      FINDINGS:  Dobbhoff tube tip projects over the gastric body at the mid abdomen.  Nonobstructive bowel gas pattern. Limited evaluation of  pneumoperitoneum on supine imaging, however no gross evidence of free  air is noted.      Visualized lungs are clear.      Osseous structures demonstrate no acute bony changes.      Impression: 1. Dobbhoff tube tip projects over the mid abdomen over the region of  the gastric body      MACRO:  None      Signed by: Ricco Boone 7/20/2024 7:03 PM  Dictation workstation:   [id number]      Physical Exam  Vitals reviewed.   Constitutional:       Comments: Thin     HENT:      Head: Normocephalic and atraumatic.   Eyes:      Extraocular Movements: Extraocular movements intact.       Conjunctiva/sclera: Conjunctivae normal.   Cardiovascular:      Rate and Rhythm: Normal rate and regular rhythm.      Heart sounds: No murmur heard.     No friction rub. No gallop.   Pulmonary:      Effort: Pulmonary effort is normal.      Comments: Mild coarse breath sounds b/l   Abdominal:      General: Bowel sounds are normal.      Palpations: Abdomen is soft. There is no mass.      Tenderness: There is no abdominal tenderness. There is no guarding or rebound.   Musculoskeletal:         General: No tenderness.      Cervical back: Neck supple.      Right lower leg: No edema.      Left lower leg: No edema.   Skin:     General: Skin is warm and dry.      Findings: No bruising or rash.   Neurological:      Mental Status: He is alert.      Comments: A&O x1 to self only   Answers questions, follows commands   Diffuse generalized weakness, strength equal b/l          Relevant Results               Scheduled medications  aspirin, 81 mg, oral, Daily  heparin (porcine), 5,000 Units, subcutaneous, q8h  insulin lispro, 0-5 Units, subcutaneous, q6h  lidocaine PF, 200 mg, inhalation, Once  melatonin, 5 mg, oral, Nightly  nystatin, 4 mL, Swish & Swallow, TID  oxygen, , inhalation, Continuous - Inhalation  pantoprazole, 40 mg, intravenous, Daily  rosuvastatin, 10 mg, oral, Daily  [Held by provider] senna, 10 mL, oral, BID  [Held by provider] tamsulosin, 0.4 mg, oral, Nightly  tiotropium, 2 puff, inhalation, Daily      Continuous medications     PRN medications  PRN medications: acetaminophen **OR** acetaminophen **OR** acetaminophen, albuterol, bisacodyl, dextrose, glucagon, moisturizing mouth  XR abdomen 1 view    Result Date: 7/20/2024  Interpreted By:  Ricco Boone, STUDY: XR ABDOMEN 1 VIEW;  7/20/2024 5:51 pm   INDICATION: Signs/Symptoms:Confirm Dobhoff placement.   COMPARISON: None.   ACCESSION NUMBER(S): [id number]   ORDERING CLINICIAN: HEIDI KELLER   FINDINGS: Dobbhoff tube tip projects over the gastric body at  the mid abdomen. Nonobstructive bowel gas pattern. Limited evaluation of pneumoperitoneum on supine imaging, however no gross evidence of free air is noted.   Visualized lungs are clear.   Osseous structures demonstrate no acute bony changes.       1. Dobbhoff tube tip projects over the mid abdomen over the region of the gastric body   MACRO: None   Signed by: Ricco Boone 7/20/2024 7:03 PM Dictation workstation:   [id number]    ECG 12 Lead    Result Date: 7/16/2024  Sinus tachycardia with Premature supraventricular complexes ST & T wave abnormality, consider inferior ischemia Abnormal ECG No previous ECGs available See ED provider note for full interpretation and clinical correlation Confirmed by Rica Yousif ([id number]) on 7/16/2024 1:41:10 PM    XR chest 1 view    Result Date: 7/16/2024  Interpreted By:  Malou Jaquez, STUDY: XR CHEST 1 VIEW;  7/16/2024 11:41 am   INDICATION: Signs/Symptoms:NG placement.   COMPARISON: None.   ACCESSION NUMBER(S): [id number]   ORDERING CLINICIAN: STEFAN DURANT   TECHNIQUE: 2 AP portable images of the chest were obtained.   FINDINGS: The heart is normal in size.   There is no obvious consolidation or pleural fluid.   A port is present on the left with the tip in the region of the superior vena cava.   There are atherosclerotic changes of the aorta.   A nasogastric tube terminates in the region of the stomach.   There are degenerative changes of the spine   COMPARISON OF FINDING:       Nasogastric tube with the tip in the region of the stomach.   No acute cardiopulmonary disease.   MACRO: none   Signed by: Malou Jaquez 7/16/2024 1:06 PM Dictation workstation:   [id number]    ECG 12 lead    Result Date: 7/15/2024  Accelerated Junctional rhythm Low voltage QRS Nonspecific T wave abnormality Abnormal ECG When compared with ECG of 13-JUL-2024 18:20, (unconfirmed) Junctional rhythm has replaced Sinus rhythm Vent. rate has decreased BY  44 BPM Non-specific change in ST segment  in Inferior leads ST no longer depressed in Lateral leads Nonspecific T wave abnormality has replaced inverted T waves in Inferior leads    CT angio chest for pulmonary embolism    Result Date: 7/14/2024  Interpreted By:  Lux Palacio, STUDY: CT ANGIO CHEST FOR PULMONARY EMBOLISM;  7/14/2024 2:39 am   INDICATION: Signs/Symptoms:to exclude PE.   COMPARISON: 7/13/2024   ACCESSION NUMBER(S): [id number]   ORDERING CLINICIAN: POOJA DARBY   TECHNIQUE: Contiguous axial images of the chest were obtained after the intravenous administration of  contrast. Coronal and sagittal reformatted images were obtained from the axial images. MIPS of the chest were also performed and reviewed.   FINDINGS: The examination is limited secondary to motion and beam hardening artifact secondary to inferior position of the patient's arms.   No axillary lymphadenopathy. 1.6 cm subcarinal lymph node. The heart is normal in size. Coronary artery atherosclerotic calcifications. No significant pericardial effusion. No evidence of acute central, main, lobar or proximal segmental pulmonary embolism.   Evaluation of the lungs is limited secondary to respiratory motion. There is redemonstration of masslike opacity in the medial right lung abutting the mediastinum which measures 4 cm x 1.5 cm in greatest axial dimensions. There nodular in size opacities posterior and superior to this opacity. There are mild consolidative opacity in the right lower lobe and in the left upper lobe and lingula compatible with pneumonia. Small nodular opacities also again noted.   Limited evaluation of the upper abdomen.   Multilevel degenerative change of the thoracic spine.       No evidence of acute pulmonary embolism.   Redemonstration of masslike opacity in the medial right upper lung abutting the mediastinum which measures 4 cm x 1.5 cm underlying malignancy is not excluded and comparison with outside prior imaging recommended and attention on short-term  progress imaging.   Nodular opacities are again noted posterior and superior to the described masslike opacity. There is interval development of mild consolidative opacities in the right lower lobe and also in the left upper lobe and lingula compatible with pneumonia. There is also minimal opacity in the medial left lower lobe. Small nodular opacities are also again noted. Short-term progress imaging recommended for further evaluation.   MACRO: None   Signed by: Lux Palacio 7/14/2024 3:33 AM Dictation workstation:   [id number]    Lower extremity venous duplex bilateral    Result Date: 7/14/2024  Interpreted By:  Lux Palacio, STUDY: [facility] US LOWER EXTREMITY VENOUS DUPLEX BILATERAL;  7/14/2024 1:32 am   INDICATION: Signs/Symptoms:High D-dimer, history of lung cancer with CT, COVID-positive, on Airvo.   COMPARISON: None.   ACCESSION NUMBER(S): [id number]   ORDERING CLINICIAN: POOJA DARBY   TECHNIQUE: Vascular ultrasound of the bilateral lower extremities was performed. Real-time compression views as well as Gray scale, color Doppler and spectral Doppler waveform analysis was performed.   FINDINGS: Evaluation of the visualized portions of the bilateral common femoral vein, proximal, mid, and distal femoral vein, and popliteal vein were performed.  Evaluation of the visualized portions of the  posterior tibial and peroneal veins were also performed.   The evaluated veins demonstrate normal compressibility. There is intact venous flow demonstrating normal respiratory variability and normal augmentation of flow with calf compression. Therefore, there is no ultrasonographic evidence for deep vein thrombosis within the evaluated veins.       No sonographic evidence for deep vein thrombosis within the evaluated veins of the bilateral lower extremity.   MACRO: None   Signed by: Lux Palacio 7/14/2024 1:33 AM Dictation workstation:   [id number]    CT head wo IV contrast    Result Date: 7/13/2024  Interpreted By:   Segun Ford, STUDY: CT HEAD WO IV CONTRAST;  7/13/2024 8:22 pm   INDICATION: Signs/Symptoms:AMS.   COMPARISON: None.   ACCESSION NUMBER(S): [id number]   ORDERING CLINICIAN: RYAN CALVILLO   TECHNIQUE: Noncontrast axial CT scan of head was performed.   FINDINGS: Parenchyma: There is no intracranial hemorrhage. The grey-white differentiation is intact. There is no mass effect or midline shift. Encephalomalacia left occipital lobe from prior infarct. Superimposed confluent supratentorial hypodensity, nonspecific, but likely secondary to chronic microvascular ischemia.   CSF Spaces: Mild generalized volume loss with concordant ventriculomegaly.   Extra-Axial Fluid: There is no extraaxial fluid collection.   Calvarium: The calvarium is unremarkable.   Paranasal sinuses: Moderate to severe diffuse paranasal sinus mucosal thickening.   Mastoids: Clear.   Orbits: Bilateral lens replacements   Soft tissues: Unremarkable.       No acute intracranial hemorrhage, mass effect, or CT apparent acute infarct. Chronic microvascular ischemia, sequela of prior left occipital lobe infarct and involutional changes.   Signed by: Segun Ford 7/13/2024 8:57 PM Dictation workstation:   [id number]    CT chest wo IV contrast    Result Date: 7/13/2024  Interpreted By:  Cristiano Brown, STUDY: CT CHEST WO IV CONTRAST;  7/13/2024 6:39 pm   INDICATION: Signs/Symptoms:Coarse breath sounds heard bilateral possible CHF versus pneumonia.   COMPARISON: None.   ACCESSION NUMBER(S): [id number]   ORDERING CLINICIAN: RYAN CALVILLO   TECHNIQUE: Axial CT images of the chest obtained without contrast.   FINDINGS: BONES: No acute osseous abnormality. Diffuse osseous demineralization. Chronic healed rib fracture deformities. LOWER NECK: Unremarkable. CHEST WALL: Left chest port with catheter tip in the mid SVC. UPPER ABDOMEN: Left adrenal gland nodule measuring 2.5 x 2.7 cm with internal Hounsfield unit of 24, indeterminate atrophic kidneys with  vascular calcifications, likely chronic medical renal disease. Mildly distended gallbladder but no surrounding inflammation. Questionable nodular contour of the liver parenchyma raising suspicion for underlying liver disease.   VESSELS: Calcification of the aortic arch and its branches. No aneurysm. HEART: Normal size. Severe coronary atherosclerosis. There is calcification of the mitral annulus, aortic annulus, and aortic valve. Metallic density within the left ventricle, difficult to characterize and localize in the absence of contrast. MEDIASTINUM AND JENELLE: No pathologically enlarged lymph nodes. LUNGS AND LARGE AIRWAYS: There is bronchiectasis, consolidation, and scarring in the right upper lobe with a linear border favoring prior right upper lobe radiation. Scattered tree-in-bud opacities throughout the bilateral lower lobes, left upper lobe, and lingula are present. PLEURA: No pleural effusion or pneumothorax.       Tree-in-bud opacities throughout the lungs is suspicious for bronchopneumonia.   Extensive coronary atherosclerosis is present but no evidence of cardiomegaly, pulmonary edema, or pleural effusions.   Right upper lobe bronchiectasis and scarred consolidation are noted which can be seen in the setting of prior right upper lobe radiation treatment. If patient has prior lung malignancy treated with radiation, then recommend comparing with prior oncologic exams to ensure stability. If patient does not have prior radiation treatment, then recommend pulmonology consultation and further characterization with PET-CT.   Left adrenal gland nodule measuring 2.7 cm is indeterminate. If outside imaging is available, recommend comparison. Otherwise, recommend characterization with adrenal protocol CT.   Subtle nodular contour of the liver parenchyma raises suspicion for underlying liver disease or early cirrhosis.     MACRO: Critical Finding:  See findings. Notification was initiated on 7/13/2024 at 7:19 pm by   Cristiano Brown.  (**-YCF-**) Instructions:   Signed by: Cristiano Brown 7/13/2024 7:20 PM Dictation workstation:   [id number]   Results for orders placed or performed during the hospital encounter of 07/13/24 (from the past 24 hour(s))   POCT GLUCOSE   Result Value Ref Range    POCT Glucose 86 74 - 99 mg/dL   POCT GLUCOSE   Result Value Ref Range    POCT Glucose 86 74 - 99 mg/dL   CBC   Result Value Ref Range    WBC 12.0 (H) 4.4 - 11.3 x10*3/uL    nRBC 0.0 0.0 - 0.0 /100 WBCs    RBC 3.48 (L) 4.50 - 5.90 x10*6/uL    Hemoglobin 10.0 (L) 13.5 - 17.5 g/dL    Hematocrit 33.0 (L) 41.0 - 52.0 %    MCV 95 80 - 100 fL    MCH 28.7 26.0 - 34.0 pg    MCHC 30.3 (L) 32.0 - 36.0 g/dL    RDW 19.0 (H) 11.5 - 14.5 %    Platelets 181 150 - 450 x10*3/uL   Renal function panel   Result Value Ref Range    Glucose 101 (H) 74 - 99 mg/dL    Sodium 142 136 - 145 mmol/L    Potassium 3.5 3.5 - 5.3 mmol/L    Chloride 108 (H) 98 - 107 mmol/L    Bicarbonate 27 21 - 32 mmol/L    Anion Gap 11 10 - 20 mmol/L    Urea Nitrogen 25 (H) 6 - 23 mg/dL    Creatinine 1.37 (H) 0.50 - 1.30 mg/dL    eGFR 54 (L) >60 mL/min/1.73m*2    Calcium 7.8 (L) 8.6 - 10.3 mg/dL    Phosphorus 3.0 2.5 - 4.9 mg/dL    Albumin 2.3 (L) 3.4 - 5.0 g/dL   Magnesium   Result Value Ref Range    Magnesium 1.84 1.60 - 2.40 mg/dL   POCT GLUCOSE   Result Value Ref Range    POCT Glucose 102 (H) 74 - 99 mg/dL   POCT GLUCOSE   Result Value Ref Range    POCT Glucose 99 74 - 99 mg/dL   POCT GLUCOSE   Result Value Ref Range    POCT Glucose 117 (H) 74 - 99 mg/dL       Assessment/Plan        This patient has a central line   Reason for the central line remaining today? Parenteral medication    Principal Problem:    Respiratory failure with hypoxia, unspecified chronicity (Multi)  Active Problems:    Lung cancer (Multi)    SANGEETHA Casas is a 73 y.o. male with past medical history of malignant neoplasm of unspecified part of right bronchus, s/p CT and immunotherapy (1 month ago PTA),  sepsis, recurrent UTI, CKD stage IV, oropharyngeal dysphagia, asthma (no home oxygen) , BPH, hematuria, hypokalemia, CAD, anemia, neutropenia, HLD, early dementia, mood disturbance, anxiety. Presented for severe hypernatremia, decreased PO intake, PNA and COVID.     Acute conditions  # Acute metabolic encephalopathy 2/2 PNA, covid   # dementia   - A & O x 0-1, holding hydroxyzine and Ativan   -Fall precautions     # AHRF 2/2 HAP, resolved   # Multifocal pneumonia, resolved   # COVID-positive s/p therapy   - on room air,   - CT chest positive for bronchopneumonia, MRSA positive.   - S/p Azithro, Vanc + Zosyn completed 7/20   - S/p Remdesivir, taper Dex 2mg 7/19 and 1mg on 7/20  - On Spiriva Respimat daily and albuterol q6h PRN  - ICS, Acapella, chest PT   - Aspiration precautions     # Oropharyngeal dysphagia with decreased p.o. intake   - NPO with NG tube in for tube feeds.   - Aspiration precautions  - On bowel regimen for constipation  - Continue Protonix 40mg IV daily   - NG tube placed and tube feeds started. Surgery consulted for PEG, will place PEG when requested.   - palliative on board.      # Recurrent UTI:   - Urine culture (7/13/24): MRSA. Completed vanc.   - Had Pickett for 2 months, was placed during last hospital stay in [place]. Exchanged 7/14, removed 7/18      Chronic conditions  # CAD &  HLD: Continue aspirin 81mg , rosuvastatin 10 mg  # L adrenal nodule: follow up with PCP   # BPH:  Holding Tamsulosin 0.4mg due to borderline BP. Restart when able.    # Liver nodule (possible cirrhosis/liver disease) : monitor  # Hx of anemia: holding po iron  # Mild hyperglycemia : 2/2 5% dextrose, No history of DM, On  SSI  # lung cancer hx : follow up outpatient     Fluid: FWF @ 300 every 4 hrs   Electrolytes: Replete PRN  Nutrition: Jevity 1.5 @ 50cc/hr    GI ppx: PPI   DVT/PE ppx: Heparin subcutaneous   Abx: Vanc + Zosyn completed 7/20  Lines/Tubes/Drains: Left chest Mediport, left sided midline (from prior to  admission), PIV, NGT, Pickett removed 7/19  Oxygen: RA   Code: DNR and DNI and no ICU     Dispo: Plan for family meeting with hospice, awaiting family decision on that vs PEG tube.               Yamilet Blankenship, DO

## 2024-07-21 NOTE — NURSING NOTE
Jevity 1.5, increased feed rate, to 30ml, per provider    Family requested nasal bridle, per Provider, pt. Does not require nasal bridle     Attempted to change all of the dressings this patient has, was able to change some but not all. He was moving his arms about and resisting.

## 2024-07-21 NOTE — NURSING NOTE
1930:  assumed pt care, pt has Mitt restraints on    2330: restarted pt's Jevity 1.5 at 20 ml/hr per resident Dr. Vale's instructions

## 2024-07-21 NOTE — PROGRESS NOTES
General Surgery/Trauma Inpatient Progress Note    Interval History:  Delmar Casas is a 73 y.o. male      Overnight Event:   None     Complaints:   None     Past Medical History:  History reviewed. No pertinent past medical history.     Past Surgical History:  History reviewed. No pertinent surgical history.     Medications:  Current Outpatient Medications   Medication Instructions    acetaminophen (TYLENOL) 650 mg, oral, Every 6 hours PRN    aspirin 81 mg, oral, Daily    bisacodyl (DULCOLAX (BISACODYL)) 10 mg, rectal, Daily PRN    ferrous fumarate 324 mg (106 mg iron) tablet 1 tablet, oral, Daily    hydrOXYzine HCL (ATARAX) 25 mg, oral, Every 4 hours PRN    LORazepam (ATIVAN) 0.5 mg, oral, Every 12 hours PRN    magnesium hydroxide (Milk of Magnesia) 400 mg/5 mL suspension 30 mL, oral, Daily PRN    mirtazapine (REMERON) 7.5 mg, oral, Nightly    potassium chloride CR 20 mEq ER tablet 20 mEq, oral, 2 times daily, Do not crush or chew.    rosuvastatin (CRESTOR) 10 mg, oral, Daily    sodium phosphates (Fleet Enema) 19-7 gram/118 mL enema enema 1 enema, rectal, Daily    tamsulosin (FLOMAX) 0.4 mg, oral, Nightly        Allergies:  No Known Allergies     Family History:  No family history on file.     Social History:  Social History     Socioeconomic History    Marital status:    Tobacco Use    Smoking status: Former     Current packs/day: 2.00     Types: Cigarettes   Vaping Use    Vaping status: Unknown     Social Determinants of Health     Financial Resource Strain: Low Risk  (7/14/2024)    Overall Financial Resource Strain (CARDIA)     Difficulty of Paying Living Expenses: Not hard at all   Transportation Needs: No Transportation Needs (7/14/2024)    PRAPARE - Transportation     Lack of Transportation (Medical): No     Lack of Transportation (Non-Medical): No   Housing Stability: Low Risk  (7/14/2024)    Housing Stability Vital Sign     Unable to Pay for Housing in the Last Year: No     Number of Times Moved in  the Last Year: 2     Homeless in the Last Year: No        Review of Systems:  A complete 12 point review of systems was performed. It is otherwise negative except as noted in the above interval history.     Vital Signs:  Vitals:    07/21/24 0346   BP: 118/75   Pulse: 66   Resp: 16   Temp: 36.1 °C (97 °F)   SpO2: 91%      Body mass index is 22.05 kg/m².      Physical Exam:  General: No acute distress.   Neuro: demential   Face: Atraumatic, no visible skin lesion.   Head: Atraumatic  Eyes: Pupils equal reactive to light. Extraocular motions intact.  Ears: Hears normal speaking voice.  Mouth, Nose, Throat: Mucous membranes moist.  Normal dentition.  Neck: Supple. No visible masses.  Breast: Not examined.   Chest: No appreciable scars or masses.   Heart/Pulse: Regular  Lung: Patent airway and no labored breath.   Vascular: Palpable radial pulses bilaterally.  Abdomen: soft, NT, ND.    Rectal and Perianal: Not examined.   Genitourinary: Not examined.   Musculoskeletal: Moves all extremities.  Normal range of motion.  Extremities: No cyanosis. No edema.   Lymphatic: No palpable lymph nodes.  Skin: No rashes or lesions.  Psychological: Normal affect      Laboratory Values:  CBC:   Lab Results   Component Value Date    WBC 12.0 (H) 07/21/2024    RBC 3.48 (L) 07/21/2024    HGB 10.0 (L) 07/21/2024    HCT 33.0 (L) 07/21/2024     07/21/2024       RFP:   Lab Results   Component Value Date     07/21/2024    K 3.5 07/21/2024     (H) 07/21/2024    CO2 27 07/21/2024    BUN 25 (H) 07/21/2024    CREATININE 1.37 (H) 07/21/2024    CALCIUM 7.8 (L) 07/21/2024    MG 1.84 07/21/2024    PHOS 3.0 07/21/2024        LFTs:   Lab Results   Component Value Date    ALBUMIN 2.3 (L) 07/21/2024            Imaging:  I have personally reviewed the images and the radiologist's report.  XR abdomen 1 view    Result Date: 7/20/2024  Interpreted By:  Ricco Boone, STUDY: XR ABDOMEN 1 VIEW;  7/20/2024 5:51 pm   INDICATION:  Signs/Symptoms:Confirm Dobhoff placement.   COMPARISON: None.   ACCESSION NUMBER(S): [id number]   ORDERING CLINICIAN: HEIDI KELLER   FINDINGS: Dobbhoff tube tip projects over the gastric body at the mid abdomen. Nonobstructive bowel gas pattern. Limited evaluation of pneumoperitoneum on supine imaging, however no gross evidence of free air is noted.   Visualized lungs are clear.   Osseous structures demonstrate no acute bony changes.       1. Dobbhoff tube tip projects over the mid abdomen over the region of the gastric body   MACRO: None   Signed by: Ricco Boone 7/20/2024 7:03 PM Dictation workstation:   [id number]    ECG 12 Lead    Result Date: 7/16/2024  Sinus tachycardia with Premature supraventricular complexes ST & T wave abnormality, consider inferior ischemia Abnormal ECG No previous ECGs available See ED provider note for full interpretation and clinical correlation Confirmed by Rica Yousif ([id number]) on 7/16/2024 1:41:10 PM    XR chest 1 view    Result Date: 7/16/2024  Interpreted By:  Malou Jaquez, STUDY: XR CHEST 1 VIEW;  7/16/2024 11:41 am   INDICATION: Signs/Symptoms:NG placement.   COMPARISON: None.   ACCESSION NUMBER(S): [id number]   ORDERING CLINICIAN: STEFAN DURANT   TECHNIQUE: 2 AP portable images of the chest were obtained.   FINDINGS: The heart is normal in size.   There is no obvious consolidation or pleural fluid.   A port is present on the left with the tip in the region of the superior vena cava.   There are atherosclerotic changes of the aorta.   A nasogastric tube terminates in the region of the stomach.   There are degenerative changes of the spine   COMPARISON OF FINDING:       Nasogastric tube with the tip in the region of the stomach.   No acute cardiopulmonary disease.   MACRO: none   Signed by: Malou Jaquez 7/16/2024 1:06 PM Dictation workstation:   [id number]    ECG 12 lead    Result Date: 7/15/2024  Accelerated Junctional rhythm Low voltage QRS Nonspecific T wave  abnormality Abnormal ECG When compared with ECG of 13-JUL-2024 18:20, (unconfirmed) Junctional rhythm has replaced Sinus rhythm Vent. rate has decreased BY  44 BPM Non-specific change in ST segment in Inferior leads ST no longer depressed in Lateral leads Nonspecific T wave abnormality has replaced inverted T waves in Inferior leads    CT angio chest for pulmonary embolism    Result Date: 7/14/2024  Interpreted By:  Lux Palacio, STUDY: CT ANGIO CHEST FOR PULMONARY EMBOLISM;  7/14/2024 2:39 am   INDICATION: Signs/Symptoms:to exclude PE.   COMPARISON: 7/13/2024   ACCESSION NUMBER(S): [id number]   ORDERING CLINICIAN: POOJA DARBY   TECHNIQUE: Contiguous axial images of the chest were obtained after the intravenous administration of  contrast. Coronal and sagittal reformatted images were obtained from the axial images. MIPS of the chest were also performed and reviewed.   FINDINGS: The examination is limited secondary to motion and beam hardening artifact secondary to inferior position of the patient's arms.   No axillary lymphadenopathy. 1.6 cm subcarinal lymph node. The heart is normal in size. Coronary artery atherosclerotic calcifications. No significant pericardial effusion. No evidence of acute central, main, lobar or proximal segmental pulmonary embolism.   Evaluation of the lungs is limited secondary to respiratory motion. There is redemonstration of masslike opacity in the medial right lung abutting the mediastinum which measures 4 cm x 1.5 cm in greatest axial dimensions. There nodular in size opacities posterior and superior to this opacity. There are mild consolidative opacity in the right lower lobe and in the left upper lobe and lingula compatible with pneumonia. Small nodular opacities also again noted.   Limited evaluation of the upper abdomen.   Multilevel degenerative change of the thoracic spine.       No evidence of acute pulmonary embolism.   Redemonstration of masslike opacity in the medial  right upper lung abutting the mediastinum which measures 4 cm x 1.5 cm underlying malignancy is not excluded and comparison with outside prior imaging recommended and attention on short-term progress imaging.   Nodular opacities are again noted posterior and superior to the described masslike opacity. There is interval development of mild consolidative opacities in the right lower lobe and also in the left upper lobe and lingula compatible with pneumonia. There is also minimal opacity in the medial left lower lobe. Small nodular opacities are also again noted. Short-term progress imaging recommended for further evaluation.   MACRO: None   Signed by: Lux Palacio 7/14/2024 3:33 AM Dictation workstation:   [id number]    Lower extremity venous duplex bilateral    Result Date: 7/14/2024  Interpreted By:  Lux Palacio, STUDY: [facility] US LOWER EXTREMITY VENOUS DUPLEX BILATERAL;  7/14/2024 1:32 am   INDICATION: Signs/Symptoms:High D-dimer, history of lung cancer with CT, COVID-positive, on Airvo.   COMPARISON: None.   ACCESSION NUMBER(S): [id number]   ORDERING CLINICIAN: POOJA DARBY   TECHNIQUE: Vascular ultrasound of the bilateral lower extremities was performed. Real-time compression views as well as Gray scale, color Doppler and spectral Doppler waveform analysis was performed.   FINDINGS: Evaluation of the visualized portions of the bilateral common femoral vein, proximal, mid, and distal femoral vein, and popliteal vein were performed.  Evaluation of the visualized portions of the  posterior tibial and peroneal veins were also performed.   The evaluated veins demonstrate normal compressibility. There is intact venous flow demonstrating normal respiratory variability and normal augmentation of flow with calf compression. Therefore, there is no ultrasonographic evidence for deep vein thrombosis within the evaluated veins.       No sonographic evidence for deep vein thrombosis within the evaluated veins of the  bilateral lower extremity.   MACRO: None   Signed by: Lux Palacio 7/14/2024 1:33 AM Dictation workstation:   [id number]    CT head wo IV contrast    Result Date: 7/13/2024  Interpreted By:  Segun Ford, STUDY: CT HEAD WO IV CONTRAST;  7/13/2024 8:22 pm   INDICATION: Signs/Symptoms:AMS.   COMPARISON: None.   ACCESSION NUMBER(S): [id number]   ORDERING CLINICIAN: RYAN CALVILLO   TECHNIQUE: Noncontrast axial CT scan of head was performed.   FINDINGS: Parenchyma: There is no intracranial hemorrhage. The grey-white differentiation is intact. There is no mass effect or midline shift. Encephalomalacia left occipital lobe from prior infarct. Superimposed confluent supratentorial hypodensity, nonspecific, but likely secondary to chronic microvascular ischemia.   CSF Spaces: Mild generalized volume loss with concordant ventriculomegaly.   Extra-Axial Fluid: There is no extraaxial fluid collection.   Calvarium: The calvarium is unremarkable.   Paranasal sinuses: Moderate to severe diffuse paranasal sinus mucosal thickening.   Mastoids: Clear.   Orbits: Bilateral lens replacements   Soft tissues: Unremarkable.       No acute intracranial hemorrhage, mass effect, or CT apparent acute infarct. Chronic microvascular ischemia, sequela of prior left occipital lobe infarct and involutional changes.   Signed by: Segun Ford 7/13/2024 8:57 PM Dictation workstation:   [id number]    CT chest wo IV contrast    Result Date: 7/13/2024  Interpreted By:  Cristiano Brown, STUDY: CT CHEST WO IV CONTRAST;  7/13/2024 6:39 pm   INDICATION: Signs/Symptoms:Coarse breath sounds heard bilateral possible CHF versus pneumonia.   COMPARISON: None.   ACCESSION NUMBER(S): [id number]   ORDERING CLINICIAN: RYAN CALVILLO   TECHNIQUE: Axial CT images of the chest obtained without contrast.   FINDINGS: BONES: No acute osseous abnormality. Diffuse osseous demineralization. Chronic healed rib fracture deformities. LOWER NECK: Unremarkable. CHEST  WALL: Left chest port with catheter tip in the mid SVC. UPPER ABDOMEN: Left adrenal gland nodule measuring 2.5 x 2.7 cm with internal Hounsfield unit of 24, indeterminate atrophic kidneys with vascular calcifications, likely chronic medical renal disease. Mildly distended gallbladder but no surrounding inflammation. Questionable nodular contour of the liver parenchyma raising suspicion for underlying liver disease.   VESSELS: Calcification of the aortic arch and its branches. No aneurysm. HEART: Normal size. Severe coronary atherosclerosis. There is calcification of the mitral annulus, aortic annulus, and aortic valve. Metallic density within the left ventricle, difficult to characterize and localize in the absence of contrast. MEDIASTINUM AND JENELLE: No pathologically enlarged lymph nodes. LUNGS AND LARGE AIRWAYS: There is bronchiectasis, consolidation, and scarring in the right upper lobe with a linear border favoring prior right upper lobe radiation. Scattered tree-in-bud opacities throughout the bilateral lower lobes, left upper lobe, and lingula are present. PLEURA: No pleural effusion or pneumothorax.       Tree-in-bud opacities throughout the lungs is suspicious for bronchopneumonia.   Extensive coronary atherosclerosis is present but no evidence of cardiomegaly, pulmonary edema, or pleural effusions.   Right upper lobe bronchiectasis and scarred consolidation are noted which can be seen in the setting of prior right upper lobe radiation treatment. If patient has prior lung malignancy treated with radiation, then recommend comparing with prior oncologic exams to ensure stability. If patient does not have prior radiation treatment, then recommend pulmonology consultation and further characterization with PET-CT.   Left adrenal gland nodule measuring 2.7 cm is indeterminate. If outside imaging is available, recommend comparison. Otherwise, recommend characterization with adrenal protocol CT.   Subtle nodular  contour of the liver parenchyma raises suspicion for underlying liver disease or early cirrhosis.     MACRO: Critical Finding:  See findings. Notification was initiated on 7/13/2024 at 7:19 pm by  Cristiano Brown.  (**-YCF-**) Instructions:   Signed by: Cristiano Brown 7/13/2024 7:20 PM Dictation workstation:   [id number]        Assessment:  This is a 73 y.o. male who has below conditions:  Multiple comorbidities as above   Poor prognosis per medicine: palliative consulted, hospice decision pending   Dysphagia      Plan:  Supportive care per medicine   Called POLISA Pang who stated that they will try NG feeding tube for now and see how patient respond and decide if go with hospice.   Hold off on PEG placement.   Please call back if decision is to go with PEG   Will sign off for now.      Marlene Barragan MD [facility]  General Surgery  Office: [phone]  Fax:     [phone]  1:01 PM   07/21/24

## 2024-07-22 LAB
ALBUMIN SERPL BCP-MCNC: 2.2 G/DL (ref 3.4–5)
ANION GAP SERPL CALC-SCNC: 10 MMOL/L (ref 10–20)
BUN SERPL-MCNC: 20 MG/DL (ref 6–23)
CALCIUM SERPL-MCNC: 7.5 MG/DL (ref 8.6–10.3)
CHLORIDE SERPL-SCNC: 108 MMOL/L (ref 98–107)
CO2 SERPL-SCNC: 27 MMOL/L (ref 21–32)
CREAT SERPL-MCNC: 1.13 MG/DL (ref 0.5–1.3)
EGFRCR SERPLBLD CKD-EPI 2021: 68 ML/MIN/1.73M*2
ERYTHROCYTE [DISTWIDTH] IN BLOOD BY AUTOMATED COUNT: 18.7 % (ref 11.5–14.5)
GLUCOSE BLD MANUAL STRIP-MCNC: 114 MG/DL (ref 74–99)
GLUCOSE BLD MANUAL STRIP-MCNC: 124 MG/DL (ref 74–99)
GLUCOSE BLD MANUAL STRIP-MCNC: 130 MG/DL (ref 74–99)
GLUCOSE SERPL-MCNC: 102 MG/DL (ref 74–99)
HCT VFR BLD AUTO: 30.3 % (ref 41–52)
HGB BLD-MCNC: 9.4 G/DL (ref 13.5–17.5)
MAGNESIUM SERPL-MCNC: 1.73 MG/DL (ref 1.6–2.4)
MCH RBC QN AUTO: 29.4 PG (ref 26–34)
MCHC RBC AUTO-ENTMCNC: 31 G/DL (ref 32–36)
MCV RBC AUTO: 95 FL (ref 80–100)
NRBC BLD-RTO: 0.2 /100 WBCS (ref 0–0)
PHOSPHATE SERPL-MCNC: 3.1 MG/DL (ref 2.5–4.9)
PLATELET # BLD AUTO: 152 X10*3/UL (ref 150–450)
POTASSIUM SERPL-SCNC: 3.5 MMOL/L (ref 3.5–5.3)
RBC # BLD AUTO: 3.2 X10*6/UL (ref 4.5–5.9)
SODIUM SERPL-SCNC: 141 MMOL/L (ref 136–145)
WBC # BLD AUTO: 8 X10*3/UL (ref 4.4–11.3)

## 2024-07-22 PROCEDURE — 2500000001 HC RX 250 WO HCPCS SELF ADMINISTERED DRUGS (ALT 637 FOR MEDICARE OP)

## 2024-07-22 PROCEDURE — 99232 SBSQ HOSP IP/OBS MODERATE 35: CPT | Performed by: STUDENT IN AN ORGANIZED HEALTH CARE EDUCATION/TRAINING PROGRAM

## 2024-07-22 PROCEDURE — 80069 RENAL FUNCTION PANEL: CPT | Performed by: INTERNAL MEDICINE

## 2024-07-22 PROCEDURE — 2500000002 HC RX 250 W HCPCS SELF ADMINISTERED DRUGS (ALT 637 FOR MEDICARE OP, ALT 636 FOR OP/ED): Performed by: INTERNAL MEDICINE

## 2024-07-22 PROCEDURE — 2500000001 HC RX 250 WO HCPCS SELF ADMINISTERED DRUGS (ALT 637 FOR MEDICARE OP): Performed by: INTERNAL MEDICINE

## 2024-07-22 PROCEDURE — 2500000004 HC RX 250 GENERAL PHARMACY W/ HCPCS (ALT 636 FOR OP/ED): Performed by: INTERNAL MEDICINE

## 2024-07-22 PROCEDURE — 85027 COMPLETE CBC AUTOMATED: CPT | Performed by: INTERNAL MEDICINE

## 2024-07-22 PROCEDURE — 94760 N-INVAS EAR/PLS OXIMETRY 1: CPT

## 2024-07-22 PROCEDURE — C9113 INJ PANTOPRAZOLE SODIUM, VIA: HCPCS | Performed by: INTERNAL MEDICINE

## 2024-07-22 PROCEDURE — 31720 CLEARANCE OF AIRWAYS: CPT

## 2024-07-22 PROCEDURE — 83735 ASSAY OF MAGNESIUM: CPT | Performed by: INTERNAL MEDICINE

## 2024-07-22 PROCEDURE — 1200000002 HC GENERAL ROOM WITH TELEMETRY DAILY

## 2024-07-22 PROCEDURE — 82947 ASSAY GLUCOSE BLOOD QUANT: CPT

## 2024-07-22 RX ADMIN — PANTOPRAZOLE SODIUM 40 MG: 40 INJECTION, POWDER, FOR SOLUTION INTRAVENOUS at 10:01

## 2024-07-22 RX ADMIN — NYSTATIN 400000 UNITS: 100000 SUSPENSION ORAL at 10:01

## 2024-07-22 RX ADMIN — ASPIRIN 81 MG CHEWABLE TABLET 81 MG: 81 TABLET CHEWABLE at 10:01

## 2024-07-22 RX ADMIN — ACETAMINOPHEN 650 MG: 650 SOLUTION ORAL at 11:08

## 2024-07-22 RX ADMIN — HEPARIN SODIUM 5000 UNITS: 5000 INJECTION INTRAVENOUS; SUBCUTANEOUS at 17:40

## 2024-07-22 RX ADMIN — Medication 5 MG: at 20:32

## 2024-07-22 RX ADMIN — HEPARIN SODIUM 5000 UNITS: 5000 INJECTION INTRAVENOUS; SUBCUTANEOUS at 10:01

## 2024-07-22 RX ADMIN — ROSUVASTATIN CALCIUM 10 MG: 10 TABLET, FILM COATED ORAL at 20:32

## 2024-07-22 RX ADMIN — NYSTATIN 400000 UNITS: 100000 SUSPENSION ORAL at 15:00

## 2024-07-22 ASSESSMENT — COGNITIVE AND FUNCTIONAL STATUS - GENERAL
EATING MEALS: TOTAL
HELP NEEDED FOR BATHING: TOTAL
TOILETING: TOTAL
HELP NEEDED FOR BATHING: TOTAL
DRESSING REGULAR UPPER BODY CLOTHING: TOTAL
PERSONAL GROOMING: TOTAL
TURNING FROM BACK TO SIDE WHILE IN FLAT BAD: TOTAL
DRESSING REGULAR LOWER BODY CLOTHING: TOTAL
MOVING TO AND FROM BED TO CHAIR: TOTAL
MOBILITY SCORE: 6
EATING MEALS: TOTAL
MOVING FROM LYING ON BACK TO SITTING ON SIDE OF FLAT BED WITH BEDRAILS: TOTAL
WALKING IN HOSPITAL ROOM: TOTAL
DAILY ACTIVITIY SCORE: 6
DRESSING REGULAR LOWER BODY CLOTHING: TOTAL
TOILETING: TOTAL
MOVING FROM LYING ON BACK TO SITTING ON SIDE OF FLAT BED WITH BEDRAILS: TOTAL
TURNING FROM BACK TO SIDE WHILE IN FLAT BAD: TOTAL
MOVING TO AND FROM BED TO CHAIR: TOTAL
WALKING IN HOSPITAL ROOM: TOTAL
STANDING UP FROM CHAIR USING ARMS: TOTAL
STANDING UP FROM CHAIR USING ARMS: TOTAL
MOBILITY SCORE: 6
WALKING IN HOSPITAL ROOM: TOTAL
TURNING FROM BACK TO SIDE WHILE IN FLAT BAD: TOTAL
DRESSING REGULAR UPPER BODY CLOTHING: TOTAL
PERSONAL GROOMING: TOTAL
PERSONAL GROOMING: TOTAL
DAILY ACTIVITIY SCORE: 6
CLIMB 3 TO 5 STEPS WITH RAILING: TOTAL
MOVING TO AND FROM BED TO CHAIR: TOTAL
STANDING UP FROM CHAIR USING ARMS: TOTAL
DRESSING REGULAR LOWER BODY CLOTHING: TOTAL
CLIMB 3 TO 5 STEPS WITH RAILING: TOTAL
CLIMB 3 TO 5 STEPS WITH RAILING: TOTAL
DAILY ACTIVITIY SCORE: 6
HELP NEEDED FOR BATHING: TOTAL
TOILETING: TOTAL
EATING MEALS: TOTAL
DRESSING REGULAR UPPER BODY CLOTHING: TOTAL

## 2024-07-22 ASSESSMENT — PAIN SCALES - PAIN ASSESSMENT IN ADVANCED DEMENTIA (PAINAD)
BREATHING: NORMAL
FACIALEXPRESSION: SMILING OR INEXPRESSIVE
CONSOLABILITY: NO NEED TO CONSOLE
BODYLANGUAGE: RELAXED
TOTALSCORE: 0

## 2024-07-22 ASSESSMENT — PAIN SCALES - GENERAL
PAINLEVEL_OUTOF10: 0 - NO PAIN
PAINLEVEL_OUTOF10: 0 - NO PAIN

## 2024-07-22 NOTE — PROGRESS NOTES
07/22/24 1108   Discharge Planning   Living Arrangements Other (Comment)   Support Systems Children;Family members   Assistance Needed Pt discharge needs to be determined. Since pt is recently from [place], he is not yet fully enrolled with [facility]. Meenakshi at [facility] states she put out a call to [facility] to determine what is needed if pt is to return to [facility] on discharge.   Type of Residence Private residence   Home or Post Acute Services Post acute facilities (Rehab/SNF/etc)   Type of Post Acute Facility Services   (SNF vs LTC VS hospice?)   Expected Discharge Disposition Hospice Res  ([facility] pending meeting 7/23 [id number] to assist with discharge planning)   Does the patient need discharge transport arranged? Yes   RoundTrip coordination needed? Yes   Has discharge transport been arranged? No

## 2024-07-22 NOTE — PROGRESS NOTES
Delmar Casas is a 73 y.o. male on day 9 of admission presenting with Respiratory failure with hypoxia, unspecified chronicity (Multi).      Subjective   Mr. Casas was slightly more oriented. He was able to state his name. However, he was unaware where he was or what year it was. He expressed no complaints. Pt refused physical exam. Nurses have been trying to reorient the patient but have not been able to do so.    His son Bird was called to update him on his father's status. At this point Bird and his brother are unsure what their plan is regarding their father's care. They will be at the hospital later tonight to discuss the plan. Bird was informed that his father is still restrained as he was trying to pull out his NG tube. Hospice meeting planned for tomorrow.     Objective     Last Recorded Vitals  /57 (BP Location: Right arm, Patient Position: Lying)   Pulse 75   Temp 36.3 °C (97.3 °F) (Temporal)   Resp 15   Wt 69.7 kg (153 lb 10.6 oz)   SpO2 92%   Intake/Output last 3 Shifts:    Intake/Output Summary (Last 24 hours) at 7/22/2024 1430  Last data filed at 7/22/2024 0904  Gross per 24 hour   Intake --   Output 1150 ml   Net -1150 ml       Admission Weight  Weight: 77.1 kg (170 lb) (07/13/24 1830)    Daily Weight  07/22/24 : 69.7 kg (153 lb 10.6 oz)    Image Results  XR abdomen 1 view  Narrative: Interpreted By:  Ricco Boone,   STUDY:  XR ABDOMEN 1 VIEW;  7/20/2024 5:51 pm      INDICATION:  Signs/Symptoms:Confirm Dobhoff placement.      COMPARISON:  None.      ACCESSION NUMBER(S):  [id number]      ORDERING CLINICIAN:  HEIDI KELLER      FINDINGS:  Dobbhoff tube tip projects over the gastric body at the mid abdomen.  Nonobstructive bowel gas pattern. Limited evaluation of  pneumoperitoneum on supine imaging, however no gross evidence of free  air is noted.      Visualized lungs are clear.      Osseous structures demonstrate no acute bony changes.      Impression: 1. Dobbhoff tube tip  projects over the mid abdomen over the region of  the gastric body      MACRO:  None      Signed by: Ricco Boone 7/20/2024 7:03 PM  Dictation workstation:   [id number]      Physical Exam  - Unable to perform as patient refused    Relevant Results             Scheduled medications  aspirin, 81 mg, oral, Daily  heparin (porcine), 5,000 Units, subcutaneous, q8h  insulin lispro, 0-5 Units, subcutaneous, q6h  lidocaine PF, 200 mg, inhalation, Once  melatonin, 5 mg, oral, Nightly  nystatin, 4 mL, Swish & Swallow, TID  pantoprazole, 40 mg, intravenous, Daily  rosuvastatin, 10 mg, oral, Daily  [Held by provider] senna, 10 mL, oral, BID  [Held by provider] tamsulosin, 0.4 mg, oral, Nightly  tiotropium, 2 puff, inhalation, Daily      Continuous medications     PRN medications  PRN medications: acetaminophen **OR** acetaminophen **OR** acetaminophen, albuterol, bisacodyl, dextrose, glucagon, moisturizing mouth  Results for orders placed or performed during the hospital encounter of 07/13/24 (from the past 24 hour(s))   POCT GLUCOSE   Result Value Ref Range    POCT Glucose 117 (H) 74 - 99 mg/dL   POCT GLUCOSE   Result Value Ref Range    POCT Glucose 111 (H) 74 - 99 mg/dL   POCT GLUCOSE   Result Value Ref Range    POCT Glucose 130 (H) 74 - 99 mg/dL   CBC   Result Value Ref Range    WBC 8.0 4.4 - 11.3 x10*3/uL    nRBC 0.2 (H) 0.0 - 0.0 /100 WBCs    RBC 3.20 (L) 4.50 - 5.90 x10*6/uL    Hemoglobin 9.4 (L) 13.5 - 17.5 g/dL    Hematocrit 30.3 (L) 41.0 - 52.0 %    MCV 95 80 - 100 fL    MCH 29.4 26.0 - 34.0 pg    MCHC 31.0 (L) 32.0 - 36.0 g/dL    RDW 18.7 (H) 11.5 - 14.5 %    Platelets 152 150 - 450 x10*3/uL   Renal function panel   Result Value Ref Range    Glucose 102 (H) 74 - 99 mg/dL    Sodium 141 136 - 145 mmol/L    Potassium 3.5 3.5 - 5.3 mmol/L    Chloride 108 (H) 98 - 107 mmol/L    Bicarbonate 27 21 - 32 mmol/L    Anion Gap 10 10 - 20 mmol/L    Urea Nitrogen 20 6 - 23 mg/dL    Creatinine 1.13 0.50 - 1.30 mg/dL    eGFR 68  >60 mL/min/1.73m*2    Calcium 7.5 (L) 8.6 - 10.3 mg/dL    Phosphorus 3.1 2.5 - 4.9 mg/dL    Albumin 2.2 (L) 3.4 - 5.0 g/dL   Magnesium   Result Value Ref Range    Magnesium 1.73 1.60 - 2.40 mg/dL   POCT GLUCOSE   Result Value Ref Range    POCT Glucose 124 (H) 74 - 99 mg/dL     XR abdomen 1 view    Result Date: 7/20/2024  Interpreted By:  Ricco Boone, STUDY: XR ABDOMEN 1 VIEW;  7/20/2024 5:51 pm   INDICATION: Signs/Symptoms:Confirm Dobhoff placement.   COMPARISON: None.   ACCESSION NUMBER(S): [id number]   ORDERING CLINICIAN: HEIDI KELLER   FINDINGS: Dobbhoff tube tip projects over the gastric body at the mid abdomen. Nonobstructive bowel gas pattern. Limited evaluation of pneumoperitoneum on supine imaging, however no gross evidence of free air is noted.   Visualized lungs are clear.   Osseous structures demonstrate no acute bony changes.       1. Dobbhoff tube tip projects over the mid abdomen over the region of the gastric body   MACRO: None   Signed by: Ricco Boone 7/20/2024 7:03 PM Dictation workstation:   [id number]    ECG 12 Lead    Result Date: 7/16/2024  Sinus tachycardia with Premature supraventricular complexes ST & T wave abnormality, consider inferior ischemia Abnormal ECG No previous ECGs available See ED provider note for full interpretation and clinical correlation Confirmed by Rica Yousif ([id number]) on 7/16/2024 1:41:10 PM    XR chest 1 view    Result Date: 7/16/2024  Interpreted By:  Malou Jaquez, STUDY: XR CHEST 1 VIEW;  7/16/2024 11:41 am   INDICATION: Signs/Symptoms:NG placement.   COMPARISON: None.   ACCESSION NUMBER(S): [id number]   ORDERING CLINICIAN: STEFAN DURANT   TECHNIQUE: 2 AP portable images of the chest were obtained.   FINDINGS: The heart is normal in size.   There is no obvious consolidation or pleural fluid.   A port is present on the left with the tip in the region of the superior vena cava.   There are atherosclerotic changes of the aorta.   A nasogastric tube  terminates in the region of the stomach.   There are degenerative changes of the spine   COMPARISON OF FINDING:       Nasogastric tube with the tip in the region of the stomach.   No acute cardiopulmonary disease.   MACRO: none   Signed by: Malou Jaquez 7/16/2024 1:06 PM Dictation workstation:   [id number]    ECG 12 lead    Result Date: 7/15/2024  Accelerated Junctional rhythm Low voltage QRS Nonspecific T wave abnormality Abnormal ECG When compared with ECG of 13-JUL-2024 18:20, (unconfirmed) Junctional rhythm has replaced Sinus rhythm Vent. rate has decreased BY  44 BPM Non-specific change in ST segment in Inferior leads ST no longer depressed in Lateral leads Nonspecific T wave abnormality has replaced inverted T waves in Inferior leads    CT angio chest for pulmonary embolism    Result Date: 7/14/2024  Interpreted By:  Lux Palacio, STUDY: CT ANGIO CHEST FOR PULMONARY EMBOLISM;  7/14/2024 2:39 am   INDICATION: Signs/Symptoms:to exclude PE.   COMPARISON: 7/13/2024   ACCESSION NUMBER(S): [id number]   ORDERING CLINICIAN: POOJA DARBY   TECHNIQUE: Contiguous axial images of the chest were obtained after the intravenous administration of  contrast. Coronal and sagittal reformatted images were obtained from the axial images. MIPS of the chest were also performed and reviewed.   FINDINGS: The examination is limited secondary to motion and beam hardening artifact secondary to inferior position of the patient's arms.   No axillary lymphadenopathy. 1.6 cm subcarinal lymph node. The heart is normal in size. Coronary artery atherosclerotic calcifications. No significant pericardial effusion. No evidence of acute central, main, lobar or proximal segmental pulmonary embolism.   Evaluation of the lungs is limited secondary to respiratory motion. There is redemonstration of masslike opacity in the medial right lung abutting the mediastinum which measures 4 cm x 1.5 cm in greatest axial dimensions. There nodular in size  opacities posterior and superior to this opacity. There are mild consolidative opacity in the right lower lobe and in the left upper lobe and lingula compatible with pneumonia. Small nodular opacities also again noted.   Limited evaluation of the upper abdomen.   Multilevel degenerative change of the thoracic spine.       No evidence of acute pulmonary embolism.   Redemonstration of masslike opacity in the medial right upper lung abutting the mediastinum which measures 4 cm x 1.5 cm underlying malignancy is not excluded and comparison with outside prior imaging recommended and attention on short-term progress imaging.   Nodular opacities are again noted posterior and superior to the described masslike opacity. There is interval development of mild consolidative opacities in the right lower lobe and also in the left upper lobe and lingula compatible with pneumonia. There is also minimal opacity in the medial left lower lobe. Small nodular opacities are also again noted. Short-term progress imaging recommended for further evaluation.   MACRO: None   Signed by: Lux Palacio 7/14/2024 3:33 AM Dictation workstation:   [id number]    Lower extremity venous duplex bilateral    Result Date: 7/14/2024  Interpreted By:  Lux Palacio, STUDY: [facility] US LOWER EXTREMITY VENOUS DUPLEX BILATERAL;  7/14/2024 1:32 am   INDICATION: Signs/Symptoms:High D-dimer, history of lung cancer with CT, COVID-positive, on Airvo.   COMPARISON: None.   ACCESSION NUMBER(S): [id number]   ORDERING CLINICIAN: POOJA DARBY   TECHNIQUE: Vascular ultrasound of the bilateral lower extremities was performed. Real-time compression views as well as Gray scale, color Doppler and spectral Doppler waveform analysis was performed.   FINDINGS: Evaluation of the visualized portions of the bilateral common femoral vein, proximal, mid, and distal femoral vein, and popliteal vein were performed.  Evaluation of the visualized portions of the  posterior tibial and  peroneal veins were also performed.   The evaluated veins demonstrate normal compressibility. There is intact venous flow demonstrating normal respiratory variability and normal augmentation of flow with calf compression. Therefore, there is no ultrasonographic evidence for deep vein thrombosis within the evaluated veins.       No sonographic evidence for deep vein thrombosis within the evaluated veins of the bilateral lower extremity.   MACRO: None   Signed by: Lux Palacio 7/14/2024 1:33 AM Dictation workstation:   [id number]    CT head wo IV contrast    Result Date: 7/13/2024  Interpreted By:  Segun Ford, STUDY: CT HEAD WO IV CONTRAST;  7/13/2024 8:22 pm   INDICATION: Signs/Symptoms:AMS.   COMPARISON: None.   ACCESSION NUMBER(S): [id number]   ORDERING CLINICIAN: RYAN CALVILLO   TECHNIQUE: Noncontrast axial CT scan of head was performed.   FINDINGS: Parenchyma: There is no intracranial hemorrhage. The grey-white differentiation is intact. There is no mass effect or midline shift. Encephalomalacia left occipital lobe from prior infarct. Superimposed confluent supratentorial hypodensity, nonspecific, but likely secondary to chronic microvascular ischemia.   CSF Spaces: Mild generalized volume loss with concordant ventriculomegaly.   Extra-Axial Fluid: There is no extraaxial fluid collection.   Calvarium: The calvarium is unremarkable.   Paranasal sinuses: Moderate to severe diffuse paranasal sinus mucosal thickening.   Mastoids: Clear.   Orbits: Bilateral lens replacements   Soft tissues: Unremarkable.       No acute intracranial hemorrhage, mass effect, or CT apparent acute infarct. Chronic microvascular ischemia, sequela of prior left occipital lobe infarct and involutional changes.   Signed by: Segun Ford 7/13/2024 8:57 PM Dictation workstation:   [id number]    CT chest wo IV contrast    Result Date: 7/13/2024  Interpreted By:  Cristiano Brown, STUDY: CT CHEST WO IV CONTRAST;  7/13/2024 6:39 pm    INDICATION: Signs/Symptoms:Coarse breath sounds heard bilateral possible CHF versus pneumonia.   COMPARISON: None.   ACCESSION NUMBER(S): [id number]   ORDERING CLINICIAN: RYAN CALVILLO   TECHNIQUE: Axial CT images of the chest obtained without contrast.   FINDINGS: BONES: No acute osseous abnormality. Diffuse osseous demineralization. Chronic healed rib fracture deformities. LOWER NECK: Unremarkable. CHEST WALL: Left chest port with catheter tip in the mid SVC. UPPER ABDOMEN: Left adrenal gland nodule measuring 2.5 x 2.7 cm with internal Hounsfield unit of 24, indeterminate atrophic kidneys with vascular calcifications, likely chronic medical renal disease. Mildly distended gallbladder but no surrounding inflammation. Questionable nodular contour of the liver parenchyma raising suspicion for underlying liver disease.   VESSELS: Calcification of the aortic arch and its branches. No aneurysm. HEART: Normal size. Severe coronary atherosclerosis. There is calcification of the mitral annulus, aortic annulus, and aortic valve. Metallic density within the left ventricle, difficult to characterize and localize in the absence of contrast. MEDIASTINUM AND JENELLE: No pathologically enlarged lymph nodes. LUNGS AND LARGE AIRWAYS: There is bronchiectasis, consolidation, and scarring in the right upper lobe with a linear border favoring prior right upper lobe radiation. Scattered tree-in-bud opacities throughout the bilateral lower lobes, left upper lobe, and lingula are present. PLEURA: No pleural effusion or pneumothorax.       Tree-in-bud opacities throughout the lungs is suspicious for bronchopneumonia.   Extensive coronary atherosclerosis is present but no evidence of cardiomegaly, pulmonary edema, or pleural effusions.   Right upper lobe bronchiectasis and scarred consolidation are noted which can be seen in the setting of prior right upper lobe radiation treatment. If patient has prior lung malignancy treated with radiation,  then recommend comparing with prior oncologic exams to ensure stability. If patient does not have prior radiation treatment, then recommend pulmonology consultation and further characterization with PET-CT.   Left adrenal gland nodule measuring 2.7 cm is indeterminate. If outside imaging is available, recommend comparison. Otherwise, recommend characterization with adrenal protocol CT.   Subtle nodular contour of the liver parenchyma raises suspicion for underlying liver disease or early cirrhosis.     MACRO: Critical Finding:  See findings. Notification was initiated on 7/13/2024 at 7:19 pm by  Cristiano Brown.  (**-YCF-**) Instructions:   Signed by: Cristiano Brown 7/13/2024 7:20 PM Dictation workstation:   [id number]     Assessment/Plan      Principal Problem:    Respiratory failure with hypoxia, unspecified chronicity (Multi)  Active Problems:    Lung cancer (Multi)    SANGEETHA Casas is a 73 y.o. male with past medical history of malignant neoplasm of unspecified part of right bronchus, s/p CT and immunotherapy (1 month ago PTA), sepsis, recurrent UTI, CKD stage IV, oropharyngeal dysphagia, asthma (no home oxygen) , BPH, hematuria, hypokalemia, CAD, anemia, neutropenia, HLD, early dementia, mood disturbance, anxiety. Presented for severe hypernatremia, decreased PO intake, PNA and COVID.      Acute conditions  # Acute metabolic encephalopathy 2/2 PNA, covid   # dementia   - A & O x 1, holding hydroxyzine and Ativan   -Fall precautions     # COVID-positive s/p therapy   - on room air,   - CT chest positive for bronchopneumonia, MRSA positive.   - S/p Azithro, Vanc + Zosyn completed 7/20   - S/p Remdesivir, taper Dex 2mg 7/19 and 1mg on 7/20  - On Spiriva Respimat daily and albuterol q6h PRN  - ICS, Acapella, chest PT   - Aspiration precautions     # Oropharyngeal dysphagia with decreased p.o. intake   - NPO with NG tube in for tube feeds.   - Aspiration precautions  - On bowel regimen for  constipation  - Continue Protonix 40mg IV daily   - NG tube placed and tube feeds started.   - Tube feeds at goal 50 ml/hr  Surgery consulted for PEG, will place PEG when requested.   - palliative on board.       Resolved Medical Issues  # AHRF 2/2 HAP, resolved   # Multifocal pneumonia, resolved   # Recurrent UTI:   - Urine culture (7/13/24): MRSA. Completed vanc.   - Had Pickett for 2 months, was placed during last hospital stay in [place]. Exchanged 7/14, removed 7/18     Chronic conditions  # CAD &  HLD: Continue aspirin 81mg , rosuvastatin 10 mg  # L adrenal nodule: follow up with PCP   # BPH:  Holding Tamsulosin 0.4mg due to borderline BP. Restart when able.    # Liver nodule (possible cirrhosis/liver disease) : monitor  # Hx of anemia: holding po iron  # Mild hyperglycemia : 2/2 5% dextrose, No history of DM, On  SSI  # lung cancer hx : follow up outpatient      Fluid: FWF @ 300 every 4 hrs   Electrolytes: Replete PRN  Nutrition: Jevity 1.5 @ 50cc/hr    GI ppx: PPI   DVT/PE ppx: Heparin subcutaneous   Abx: Vanc + Zosyn completed 7/20  Lines/Tubes/Drains: Left chest Mediport, left sided midline (from prior to admission), PIV, NGT, Pickett removed 7/19  Oxygen: RA   Code: DNR and DNI and no ICU      Dispo: Plan for family meeting with hospice, awaiting family decision on that vs PEG tube.      Damian Wick DO

## 2024-07-22 NOTE — NURSING NOTE
1000: Wound care completed. Meds given. Pt observed removing mitts, reaching for lines. Requested to provider order for soft bilateral wrist restraints instead of mitts.     1030: While providing pt care, pt observed having moist congested cough. Respiratory paged, came to bedside to perform suction. Mucusy secretions suctioned, pt tolerated moderately well, resisted care, disoriented x4.    1045: New tube feed, tap water flush, and tf tubing hung. Pt is at target rate of 50mL/hr. Dr Giordano at bedside.

## 2024-07-22 NOTE — CONSULTS
"Patient has Malnutrition Diagnosis:  (Unable to determine 2/2 limited history)  Nutrition History:  Food and Nutrient History:     (7/22/24) Chart reviewed for follow up and nutrition re-consulted for TF recs. Pt pulled out NGT 7/19/24, replaced 7/20/24. Pt tolerating TF at goal rate of 50 ml/hr at time of visit. Noted plan for family to meet with [facility] tomorrow, 7/23/34.     (7/18/24) Pt seen for follow up visit, remains in isolation for COVID-19. Pt toelrating TF - no GRV noted.  Water flush increased to 300 ml q 4 hours; IVF discontinued 7/16/24.     (7/16/24) PT NPO x 3-4 days, possibly longer. Pt recently moved from [place], was at [facility] x 1 day PTA. pt has not been alert enough for oral intakes or to participate with SLP eval.  At NH, pt was hayley Puree diet with NTL, no straws. Pt now has NGT, plan to initiate enteral nutrition once x-ray confirms placement. IVF D5@ 50 ml/hr= 204 kcal, 60 g CHO in 1200 ml volume/24 hours     No Known Allergies   GI Symptoms: None     Oral Problems: Swallowing difficulty          Anthropometrics:  Height: 177.8 cm (5' 10\")   Weight: 69.7 kg (153 lb 10.6 oz)   BMI (Calculated): 22.05  IBW/kg (Dietitian Calculated): 75.5 kg  Percent of IBW: 92 %       Weight History:     Daily Weight  07/22/24 : 69.7 kg (153 lb 10.6 oz)    Weight         7/19/2024  0800 7/20/2024  0550 7/21/2024  0500 7/21/2024  0558 7/22/2024  0500    Weight: 71.1 kg (156 lb 12 oz) 65.3 kg (143 lb 15.4 oz) 69.7 kg (153 lb 10.6 oz) 69.7 kg (153 lb 10.6 oz) 69.7 kg (153 lb 10.6 oz)            Weight Change %:                Nutrition Focused Physical Exam Findings:       Edema  Edema: +1 trace, +2 mild  Edema Location: +2 bilat UE; +1 bilat LE  Physical Findings (Nutrition Deficiency/Toxicity)  Skin: Positive (wounds to R nose; L arm skin tear; R arm skin tear, R elbow skin tear)    Nutrition Significant Labs:    Results from last 7 days   Lab Units 07/22/24  0630 07/21/24  0539 07/20/24  0616   GLUCOSE mg/dL 102* 101* " "118*   SODIUM mmol/L 141 142 142   POTASSIUM mmol/L 3.5 3.5 4.2   CHLORIDE mmol/L 108* 108* 109*   CO2 mmol/L 27 27 27   BUN mg/dL 20 25* 28*   CREATININE mg/dL 1.13 1.37* 1.22   EGFR mL/min/1.73m*2 68 54* 62   CALCIUM mg/dL 7.5* 7.8* 7.9*   PHOSPHORUS mg/dL 3.1 3.0 2.9   MAGNESIUM mg/dL 1.73 1.84 1.77     No results found for: \"HGBA1C\"  Results from last 7 days   Lab Units 07/22/24  1208 07/22/24  0537 07/21/24  2350 07/21/24  1728 07/21/24  1200 07/21/24  0548 07/20/24  2354 07/20/24  1754   POCT GLUCOSE mg/dL 124* 130* 111* 117* 99 102* 86 86     Lab Results   Component Value Date    ALBUMIN 2.2 (L) 07/22/2024      No results found for: \"CRP\"    Nutrition Specific Medications:   Scheduled medications:  aspirin, 81 mg, oral, Daily  heparin (porcine), 5,000 Units, subcutaneous, q8h  insulin lispro, 0-5 Units, subcutaneous, q6h  lidocaine PF, 200 mg, inhalation, Once  melatonin, 5 mg, oral, Nightly  nystatin, 4 mL, Swish & Swallow, TID  pantoprazole, 40 mg, intravenous, Daily  rosuvastatin, 10 mg, oral, Daily  [Held by provider] senna, 10 mL, oral, BID  [Held by provider] tamsulosin, 0.4 mg, oral, Nightly  tiotropium, 2 puff, inhalation, Daily      Continuous medications:     PRN medications:  PRN medications: acetaminophen **OR** acetaminophen **OR** acetaminophen, albuterol, bisacodyl, dextrose, glucagon, moisturizing mouth     Nursing Data Per flowsheet:   Stool Appearance: Watery (07/22/24 0300)  Gastrointestinal  Gastrointestinal (WDL): Exceptions to WDL (ng tube feed)  Bowel Incontinence: Yes  Stool Appearance: Watery  Stool Color: Brown  Feeding assistance level: Completely dependent    Intake/Output Summary (Last 24 hours) at 7/22/2024 1314  Last data filed at 7/22/2024 0904  Gross per 24 hour   Intake --   Output 1150 ml   Net -1150 ml      0-10 (Numeric) Pain Score: 0 - No pain   Dietary Orders (From admission, onward)       Start     Ordered    07/21/24 1545  Enteral feeding with NPO NG (nasogastric " tube); 50; 300; Water; Tap water; Every 4 hours  Diet effective now        Comments: Start Jevity 1.5 @ 20 ml/hr hours via NGT. If no major shifts in electrolytes and blood sugars are acceptable, increase TF by 10 ml/hr every 6-8 hours until goal met at 50 ml/hr.   Question Answer Comment   Tube feeding formula: Jevity 1.5    Feeding route: NG (nasogastric tube)    Tube feeding continuous rate (mL/hr): 50    Tube feeding flush (mL): 300    Flush type: Water    Water type: Tap water    Flush frequency: Every 4 hours        07/21/24 1545    07/15/24 1026  May Participate in Room Service  Once        Question:  .  Answer:  Yes    07/15/24 1026                     Estimated Needs:   Total Energy Estimated Needs (kCal):  (3626-0990 kcal (25-30 kcal/kg))     Total Protein Estimated Needs (g):  ( g protein (1.2-1.5 g/kg))     Total Fluid Estimated Needs (mL):  (1 ml/kcal)          Nutrition Diagnosis   Malnutrition Diagnosis  Patient has Malnutrition Diagnosis:  (Unable to determine 2/2 limited history)    Nutrition Diagnosis  Patient has Nutrition Diagnosis: Yes  Diagnosis Status (1): Ongoing  Nutrition Diagnosis 1: Inadequate protein energy intake  Related to (1): inability to take oral diet with altered mental status  As Evidenced by (1): no oral intakes x 3-4 days, possibly longer  Additional Assessment Information (1): Plan to initiate EN via NGT       Nutrition Interventions/Recommendations   Nutrition Prescription:  EN    Nutrition Interventions:   Interventions: Enteral intake  Goal: Jevity 1.5 @ 50 ml/hr= 1800 kcal, 77 g protein, 912 ml free water in 1200 ml volume/ 24 hours; additional 300 ml FWF every 4 hours= 2712 ml free water/24 hours      Coordination of Care: n/a  Nutrition Education:   N/A  Recommendations:  Continue with current TF and water flush order  Weights: 2-3 x/week  GOC per palliative and [facility]         Nutrition Monitoring and Evaluation   Food/Nutrient Related History Monitoring  Monitoring  and Evaluation Plan: Energy intake, Enteral and parenteral nutrition intake  Criteria: Pt will tolerate TF at goal rate of 50 ml/hr within next 3-4 days    Body Composition/Growth/Weight History  Monitoring and Evaluation Plan: Weight  Weight: Measured weight  Criteria: Monitor weights as available    Biochemical Data, Medical Tests and Procedures  Monitoring and Evaluation Plan: Electrolyte/renal panel, Glucose/endocrine profile  Criteria: Monitor labs as available                 Time Spent (min): 30 minutes

## 2024-07-23 VITALS
HEIGHT: 70 IN | TEMPERATURE: 97.9 F | HEART RATE: 80 BPM | BODY MASS INDEX: 22.03 KG/M2 | SYSTOLIC BLOOD PRESSURE: 100 MMHG | RESPIRATION RATE: 16 BRPM | OXYGEN SATURATION: 95 % | WEIGHT: 153.9 LBS | DIASTOLIC BLOOD PRESSURE: 60 MMHG

## 2024-07-23 LAB
ALBUMIN SERPL BCP-MCNC: 2.2 G/DL (ref 3.4–5)
ANION GAP SERPL CALC-SCNC: 9 MMOL/L (ref 10–20)
BUN SERPL-MCNC: 18 MG/DL (ref 6–23)
CALCIUM SERPL-MCNC: 7.6 MG/DL (ref 8.6–10.3)
CHLORIDE SERPL-SCNC: 108 MMOL/L (ref 98–107)
CO2 SERPL-SCNC: 27 MMOL/L (ref 21–32)
CREAT SERPL-MCNC: 1.08 MG/DL (ref 0.5–1.3)
EGFRCR SERPLBLD CKD-EPI 2021: 72 ML/MIN/1.73M*2
ERYTHROCYTE [DISTWIDTH] IN BLOOD BY AUTOMATED COUNT: 19 % (ref 11.5–14.5)
GLUCOSE BLD MANUAL STRIP-MCNC: 116 MG/DL (ref 74–99)
GLUCOSE BLD MANUAL STRIP-MCNC: 122 MG/DL (ref 74–99)
GLUCOSE BLD MANUAL STRIP-MCNC: 128 MG/DL (ref 74–99)
GLUCOSE BLD MANUAL STRIP-MCNC: 129 MG/DL (ref 74–99)
GLUCOSE SERPL-MCNC: 133 MG/DL (ref 74–99)
HCT VFR BLD AUTO: 30 % (ref 41–52)
HGB BLD-MCNC: 9.1 G/DL (ref 13.5–17.5)
MAGNESIUM SERPL-MCNC: 1.65 MG/DL (ref 1.6–2.4)
MCH RBC QN AUTO: 28.8 PG (ref 26–34)
MCHC RBC AUTO-ENTMCNC: 30.3 G/DL (ref 32–36)
MCV RBC AUTO: 95 FL (ref 80–100)
NRBC BLD-RTO: 0 /100 WBCS (ref 0–0)
PHOSPHATE SERPL-MCNC: 2.6 MG/DL (ref 2.5–4.9)
PLATELET # BLD AUTO: 142 X10*3/UL (ref 150–450)
POTASSIUM SERPL-SCNC: 3.8 MMOL/L (ref 3.5–5.3)
RBC # BLD AUTO: 3.16 X10*6/UL (ref 4.5–5.9)
SODIUM SERPL-SCNC: 140 MMOL/L (ref 136–145)
WBC # BLD AUTO: 9.7 X10*3/UL (ref 4.4–11.3)

## 2024-07-23 PROCEDURE — 80069 RENAL FUNCTION PANEL: CPT | Performed by: INTERNAL MEDICINE

## 2024-07-23 PROCEDURE — 99232 SBSQ HOSP IP/OBS MODERATE 35: CPT

## 2024-07-23 PROCEDURE — 2500000004 HC RX 250 GENERAL PHARMACY W/ HCPCS (ALT 636 FOR OP/ED): Performed by: INTERNAL MEDICINE

## 2024-07-23 PROCEDURE — 85027 COMPLETE CBC AUTOMATED: CPT | Performed by: INTERNAL MEDICINE

## 2024-07-23 PROCEDURE — 2500000001 HC RX 250 WO HCPCS SELF ADMINISTERED DRUGS (ALT 637 FOR MEDICARE OP): Performed by: INTERNAL MEDICINE

## 2024-07-23 PROCEDURE — C9113 INJ PANTOPRAZOLE SODIUM, VIA: HCPCS | Performed by: INTERNAL MEDICINE

## 2024-07-23 PROCEDURE — 94760 N-INVAS EAR/PLS OXIMETRY 1: CPT

## 2024-07-23 PROCEDURE — 82947 ASSAY GLUCOSE BLOOD QUANT: CPT

## 2024-07-23 PROCEDURE — 83735 ASSAY OF MAGNESIUM: CPT | Performed by: INTERNAL MEDICINE

## 2024-07-23 PROCEDURE — 9420000001 HC RT PATIENT EDUCATION 5 MIN

## 2024-07-23 PROCEDURE — 2500000004 HC RX 250 GENERAL PHARMACY W/ HCPCS (ALT 636 FOR OP/ED)

## 2024-07-23 RX ORDER — BISACODYL 10 MG/1
10 SUPPOSITORY RECTAL DAILY PRN
Status: CANCELLED | OUTPATIENT
Start: 2024-07-23

## 2024-07-23 RX ORDER — LORAZEPAM 2 MG/ML
0.5 INJECTION INTRAMUSCULAR EVERY 4 HOURS PRN
Status: CANCELLED | OUTPATIENT
Start: 2024-07-23

## 2024-07-23 RX ORDER — MORPHINE SULFATE 2 MG/ML
2 INJECTION, SOLUTION INTRAMUSCULAR; INTRAVENOUS EVERY 2 HOUR PRN
Status: CANCELLED | OUTPATIENT
Start: 2024-07-23

## 2024-07-23 RX ORDER — LORAZEPAM 2 MG/ML
0.5 INJECTION INTRAMUSCULAR EVERY 6 HOURS SCHEDULED
Status: CANCELLED | OUTPATIENT
Start: 2024-07-23

## 2024-07-23 RX ORDER — MORPHINE SULFATE 2 MG/ML
2 INJECTION, SOLUTION INTRAMUSCULAR; INTRAVENOUS EVERY 8 HOURS
Status: CANCELLED | OUTPATIENT
Start: 2024-07-23

## 2024-07-23 RX ORDER — ACETAMINOPHEN 160 MG/5ML
650 SOLUTION ORAL EVERY 4 HOURS PRN
Status: CANCELLED | OUTPATIENT
Start: 2024-07-23

## 2024-07-23 RX ORDER — ACETAMINOPHEN 650 MG/1
650 SUPPOSITORY RECTAL EVERY 4 HOURS PRN
Status: CANCELLED | OUTPATIENT
Start: 2024-07-23

## 2024-07-23 RX ORDER — MAGNESIUM SULFATE HEPTAHYDRATE 40 MG/ML
2 INJECTION, SOLUTION INTRAVENOUS ONCE
Status: COMPLETED | OUTPATIENT
Start: 2024-07-23 | End: 2024-07-23

## 2024-07-23 RX ORDER — ACETAMINOPHEN 325 MG/1
650 TABLET ORAL EVERY 4 HOURS PRN
Status: CANCELLED | OUTPATIENT
Start: 2024-07-23

## 2024-07-23 RX ORDER — HALOPERIDOL 2 MG/ML
1 SOLUTION ORAL EVERY 4 HOURS PRN
Status: CANCELLED | OUTPATIENT
Start: 2024-07-23

## 2024-07-23 RX ADMIN — HEPARIN SODIUM 5000 UNITS: 5000 INJECTION INTRAVENOUS; SUBCUTANEOUS at 17:11

## 2024-07-23 RX ADMIN — PANTOPRAZOLE SODIUM 40 MG: 40 INJECTION, POWDER, FOR SOLUTION INTRAVENOUS at 08:21

## 2024-07-23 RX ADMIN — NYSTATIN 4 ML: 100000 SUSPENSION ORAL at 15:11

## 2024-07-23 RX ADMIN — ASPIRIN 81 MG CHEWABLE TABLET 81 MG: 81 TABLET CHEWABLE at 08:21

## 2024-07-23 RX ADMIN — HEPARIN SODIUM 5000 UNITS: 5000 INJECTION INTRAVENOUS; SUBCUTANEOUS at 10:26

## 2024-07-23 RX ADMIN — NYSTATIN 400000 UNITS: 100000 SUSPENSION ORAL at 08:21

## 2024-07-23 RX ADMIN — MAGNESIUM SULFATE HEPTAHYDRATE 2 G: 2 INJECTION, SOLUTION INTRAVENOUS at 08:25

## 2024-07-23 ASSESSMENT — COGNITIVE AND FUNCTIONAL STATUS - GENERAL
MOVING TO AND FROM BED TO CHAIR: TOTAL
CLIMB 3 TO 5 STEPS WITH RAILING: TOTAL
MOVING FROM LYING ON BACK TO SITTING ON SIDE OF FLAT BED WITH BEDRAILS: TOTAL
MOBILITY SCORE: 6
WALKING IN HOSPITAL ROOM: TOTAL
DRESSING REGULAR UPPER BODY CLOTHING: TOTAL
PERSONAL GROOMING: TOTAL
TURNING FROM BACK TO SIDE WHILE IN FLAT BAD: TOTAL
EATING MEALS: TOTAL
TOILETING: TOTAL
STANDING UP FROM CHAIR USING ARMS: TOTAL
HELP NEEDED FOR BATHING: TOTAL
DRESSING REGULAR LOWER BODY CLOTHING: TOTAL
DAILY ACTIVITIY SCORE: 6

## 2024-07-23 ASSESSMENT — PAIN SCALES - GENERAL: PAINLEVEL_OUTOF10: 0 - NO PAIN

## 2024-07-23 ASSESSMENT — PAIN - FUNCTIONAL ASSESSMENT: PAIN_FUNCTIONAL_ASSESSMENT: 0-10

## 2024-07-23 NOTE — PROGRESS NOTES
Delmar Casas is a 73 y.o. male on day 10 of admission presenting with Respiratory failure with hypoxia, unspecified chronicity (Multi).      Subjective   He is awake and alert at best to his name only. Cannot provide any further information.        Objective     Last Recorded Vitals  /59 (BP Location: Right arm, Patient Position: Lying)   Pulse 86   Temp 37 °C (98.6 °F) (Temporal)   Resp 15   Wt 69.8 kg (153 lb 14.4 oz)   SpO2 96%   Intake/Output last 3 Shifts:    Intake/Output Summary (Last 24 hours) at 7/23/2024 1040  Last data filed at 7/23/2024 0900  Gross per 24 hour   Intake 55336 ml   Output 750 ml   Net 79176 ml       Admission Weight  Weight: 77.1 kg (170 lb) (07/13/24 1830)    Daily Weight  07/23/24 : 69.8 kg (153 lb 14.4 oz)    Image Results  XR abdomen 1 view  Narrative: Interpreted By:  Ricco Boone,   STUDY:  XR ABDOMEN 1 VIEW;  7/20/2024 5:51 pm      INDICATION:  Signs/Symptoms:Confirm Dobhoff placement.      COMPARISON:  None.      ACCESSION NUMBER(S):  [id number]      ORDERING CLINICIAN:  HEIDI KELLER      FINDINGS:  Dobbhoff tube tip projects over the gastric body at the mid abdomen.  Nonobstructive bowel gas pattern. Limited evaluation of  pneumoperitoneum on supine imaging, however no gross evidence of free  air is noted.      Visualized lungs are clear.      Osseous structures demonstrate no acute bony changes.      Impression: 1. Dobbhoff tube tip projects over the mid abdomen over the region of  the gastric body      MACRO:  None      Signed by: Ricco Boone 7/20/2024 7:03 PM  Dictation workstation:   [id number]      Physical Exam  Constitutional:       Appearance: Normal appearance. He is normal weight.   HENT:      Head: Normocephalic.      Nose: Nose normal.      Mouth/Throat:      Mouth: Mucous membranes are moist.   Eyes:      Extraocular Movements: Extraocular movements intact.      Conjunctiva/sclera: Conjunctivae normal.      Pupils: Pupils are equal, round,  and reactive to light.   Cardiovascular:      Rate and Rhythm: Normal rate and regular rhythm.      Pulses: Normal pulses.      Heart sounds: Normal heart sounds.   Pulmonary:      Effort: Pulmonary effort is normal.      Breath sounds: Normal breath sounds.   Abdominal:      General: Abdomen is flat. Bowel sounds are normal.      Palpations: Abdomen is soft.   Musculoskeletal:         General: Normal range of motion.   Skin:     General: Skin is warm.   Neurological:      Mental Status: He is alert.      Comments: AOx1         Relevant Results    Scheduled medications  aspirin, 81 mg, oral, Daily  heparin (porcine), 5,000 Units, subcutaneous, q8h  insulin lispro, 0-5 Units, subcutaneous, q6h  lidocaine PF, 200 mg, inhalation, Once  melatonin, 5 mg, oral, Nightly  nystatin, 4 mL, Swish & Swallow, TID  pantoprazole, 40 mg, intravenous, Daily  rosuvastatin, 10 mg, oral, Daily  [Held by provider] senna, 10 mL, oral, BID  [Held by provider] tamsulosin, 0.4 mg, oral, Nightly  tiotropium, 2 puff, inhalation, Daily      Continuous medications     PRN medications  PRN medications: acetaminophen **OR** acetaminophen **OR** acetaminophen, albuterol, bisacodyl, dextrose, glucagon, moisturizing mouth  Results for orders placed or performed during the hospital encounter of 07/13/24 (from the past 24 hour(s))   POCT GLUCOSE   Result Value Ref Range    POCT Glucose 124 (H) 74 - 99 mg/dL   POCT GLUCOSE   Result Value Ref Range    POCT Glucose 114 (H) 74 - 99 mg/dL   POCT GLUCOSE   Result Value Ref Range    POCT Glucose 116 (H) 74 - 99 mg/dL   POCT GLUCOSE   Result Value Ref Range    POCT Glucose 129 (H) 74 - 99 mg/dL   CBC   Result Value Ref Range    WBC 9.7 4.4 - 11.3 x10*3/uL    nRBC 0.0 0.0 - 0.0 /100 WBCs    RBC 3.16 (L) 4.50 - 5.90 x10*6/uL    Hemoglobin 9.1 (L) 13.5 - 17.5 g/dL    Hematocrit 30.0 (L) 41.0 - 52.0 %    MCV 95 80 - 100 fL    MCH 28.8 26.0 - 34.0 pg    MCHC 30.3 (L) 32.0 - 36.0 g/dL    RDW 19.0 (H) 11.5 - 14.5 %     Platelets 142 (L) 150 - 450 x10*3/uL   Renal function panel   Result Value Ref Range    Glucose 133 (H) 74 - 99 mg/dL    Sodium 140 136 - 145 mmol/L    Potassium 3.8 3.5 - 5.3 mmol/L    Chloride 108 (H) 98 - 107 mmol/L    Bicarbonate 27 21 - 32 mmol/L    Anion Gap 9 (L) 10 - 20 mmol/L    Urea Nitrogen 18 6 - 23 mg/dL    Creatinine 1.08 0.50 - 1.30 mg/dL    eGFR 72 >60 mL/min/1.73m*2    Calcium 7.6 (L) 8.6 - 10.3 mg/dL    Phosphorus 2.6 2.5 - 4.9 mg/dL    Albumin 2.2 (L) 3.4 - 5.0 g/dL   Magnesium   Result Value Ref Range    Magnesium 1.65 1.60 - 2.40 mg/dL     XR abdomen 1 view    Result Date: 7/20/2024  Interpreted By:  Ricco Boone, STUDY: XR ABDOMEN 1 VIEW;  7/20/2024 5:51 pm   INDICATION: Signs/Symptoms:Confirm Dobhoff placement.   COMPARISON: None.   ACCESSION NUMBER(S): [id number]   ORDERING CLINICIAN: HEIDI KELLER   FINDINGS: Dobbhoff tube tip projects over the gastric body at the mid abdomen. Nonobstructive bowel gas pattern. Limited evaluation of pneumoperitoneum on supine imaging, however no gross evidence of free air is noted.   Visualized lungs are clear.   Osseous structures demonstrate no acute bony changes.       1. Dobbhoff tube tip projects over the mid abdomen over the region of the gastric body   MACRO: None   Signed by: Ricco Boone 7/20/2024 7:03 PM Dictation workstation:   [id number]    ECG 12 Lead    Result Date: 7/16/2024  Sinus tachycardia with Premature supraventricular complexes ST & T wave abnormality, consider inferior ischemia Abnormal ECG No previous ECGs available See ED provider note for full interpretation and clinical correlation Confirmed by Rica Yousif ([id number]) on 7/16/2024 1:41:10 PM    XR chest 1 view    Result Date: 7/16/2024  Interpreted By:  Malou Jaquez, STUDY: XR CHEST 1 VIEW;  7/16/2024 11:41 am   INDICATION: Signs/Symptoms:NG placement.   COMPARISON: None.   ACCESSION NUMBER(S): [id number]   ORDERING CLINICIAN: STEFAN DURANT   TECHNIQUE: 2 AP  portable images of the chest were obtained.   FINDINGS: The heart is normal in size.   There is no obvious consolidation or pleural fluid.   A port is present on the left with the tip in the region of the superior vena cava.   There are atherosclerotic changes of the aorta.   A nasogastric tube terminates in the region of the stomach.   There are degenerative changes of the spine   COMPARISON OF FINDING:       Nasogastric tube with the tip in the region of the stomach.   No acute cardiopulmonary disease.   MACRO: none   Signed by: Malou Jaquez 7/16/2024 1:06 PM Dictation workstation:   [id number]    ECG 12 lead    Result Date: 7/15/2024  Accelerated Junctional rhythm Low voltage QRS Nonspecific T wave abnormality Abnormal ECG When compared with ECG of 13-JUL-2024 18:20, (unconfirmed) Junctional rhythm has replaced Sinus rhythm Vent. rate has decreased BY  44 BPM Non-specific change in ST segment in Inferior leads ST no longer depressed in Lateral leads Nonspecific T wave abnormality has replaced inverted T waves in Inferior leads    CT angio chest for pulmonary embolism    Result Date: 7/14/2024  Interpreted By:  Lux Palacio, STUDY: CT ANGIO CHEST FOR PULMONARY EMBOLISM;  7/14/2024 2:39 am   INDICATION: Signs/Symptoms:to exclude PE.   COMPARISON: 7/13/2024   ACCESSION NUMBER(S): [id number]   ORDERING CLINICIAN: POOJA DARBY   TECHNIQUE: Contiguous axial images of the chest were obtained after the intravenous administration of  contrast. Coronal and sagittal reformatted images were obtained from the axial images. MIPS of the chest were also performed and reviewed.   FINDINGS: The examination is limited secondary to motion and beam hardening artifact secondary to inferior position of the patient's arms.   No axillary lymphadenopathy. 1.6 cm subcarinal lymph node. The heart is normal in size. Coronary artery atherosclerotic calcifications. No significant pericardial effusion. No evidence of acute central, main,  lobar or proximal segmental pulmonary embolism.   Evaluation of the lungs is limited secondary to respiratory motion. There is redemonstration of masslike opacity in the medial right lung abutting the mediastinum which measures 4 cm x 1.5 cm in greatest axial dimensions. There nodular in size opacities posterior and superior to this opacity. There are mild consolidative opacity in the right lower lobe and in the left upper lobe and lingula compatible with pneumonia. Small nodular opacities also again noted.   Limited evaluation of the upper abdomen.   Multilevel degenerative change of the thoracic spine.       No evidence of acute pulmonary embolism.   Redemonstration of masslike opacity in the medial right upper lung abutting the mediastinum which measures 4 cm x 1.5 cm underlying malignancy is not excluded and comparison with outside prior imaging recommended and attention on short-term progress imaging.   Nodular opacities are again noted posterior and superior to the described masslike opacity. There is interval development of mild consolidative opacities in the right lower lobe and also in the left upper lobe and lingula compatible with pneumonia. There is also minimal opacity in the medial left lower lobe. Small nodular opacities are also again noted. Short-term progress imaging recommended for further evaluation.   MACRO: None   Signed by: Lux Palacio 7/14/2024 3:33 AM Dictation workstation:   [id number]    Lower extremity venous duplex bilateral    Result Date: 7/14/2024  Interpreted By:  Lux Palacio, STUDY: [facility] US LOWER EXTREMITY VENOUS DUPLEX BILATERAL;  7/14/2024 1:32 am   INDICATION: Signs/Symptoms:High D-dimer, history of lung cancer with CT, COVID-positive, on Airvo.   COMPARISON: None.   ACCESSION NUMBER(S): [id number]   ORDERING CLINICIAN: POOJA DARBY   TECHNIQUE: Vascular ultrasound of the bilateral lower extremities was performed. Real-time compression views as well as Gray scale, color  Doppler and spectral Doppler waveform analysis was performed.   FINDINGS: Evaluation of the visualized portions of the bilateral common femoral vein, proximal, mid, and distal femoral vein, and popliteal vein were performed.  Evaluation of the visualized portions of the  posterior tibial and peroneal veins were also performed.   The evaluated veins demonstrate normal compressibility. There is intact venous flow demonstrating normal respiratory variability and normal augmentation of flow with calf compression. Therefore, there is no ultrasonographic evidence for deep vein thrombosis within the evaluated veins.       No sonographic evidence for deep vein thrombosis within the evaluated veins of the bilateral lower extremity.   MACRO: None   Signed by: Lux Palacio 7/14/2024 1:33 AM Dictation workstation:   [id number]    CT head wo IV contrast    Result Date: 7/13/2024  Interpreted By:  Segun Ford, STUDY: CT HEAD WO IV CONTRAST;  7/13/2024 8:22 pm   INDICATION: Signs/Symptoms:AMS.   COMPARISON: None.   ACCESSION NUMBER(S): [id number]   ORDERING CLINICIAN: RYAN CALVILLO   TECHNIQUE: Noncontrast axial CT scan of head was performed.   FINDINGS: Parenchyma: There is no intracranial hemorrhage. The grey-white differentiation is intact. There is no mass effect or midline shift. Encephalomalacia left occipital lobe from prior infarct. Superimposed confluent supratentorial hypodensity, nonspecific, but likely secondary to chronic microvascular ischemia.   CSF Spaces: Mild generalized volume loss with concordant ventriculomegaly.   Extra-Axial Fluid: There is no extraaxial fluid collection.   Calvarium: The calvarium is unremarkable.   Paranasal sinuses: Moderate to severe diffuse paranasal sinus mucosal thickening.   Mastoids: Clear.   Orbits: Bilateral lens replacements   Soft tissues: Unremarkable.       No acute intracranial hemorrhage, mass effect, or CT apparent acute infarct. Chronic microvascular ischemia,  sequela of prior left occipital lobe infarct and involutional changes.   Signed by: Segun Ford 7/13/2024 8:57 PM Dictation workstation:   [id number]    CT chest wo IV contrast    Result Date: 7/13/2024  Interpreted By:  Cristiano Brown, STUDY: CT CHEST WO IV CONTRAST;  7/13/2024 6:39 pm   INDICATION: Signs/Symptoms:Coarse breath sounds heard bilateral possible CHF versus pneumonia.   COMPARISON: None.   ACCESSION NUMBER(S): [id number]   ORDERING CLINICIAN: RYAN CALVILLO   TECHNIQUE: Axial CT images of the chest obtained without contrast.   FINDINGS: BONES: No acute osseous abnormality. Diffuse osseous demineralization. Chronic healed rib fracture deformities. LOWER NECK: Unremarkable. CHEST WALL: Left chest port with catheter tip in the mid SVC. UPPER ABDOMEN: Left adrenal gland nodule measuring 2.5 x 2.7 cm with internal Hounsfield unit of 24, indeterminate atrophic kidneys with vascular calcifications, likely chronic medical renal disease. Mildly distended gallbladder but no surrounding inflammation. Questionable nodular contour of the liver parenchyma raising suspicion for underlying liver disease.   VESSELS: Calcification of the aortic arch and its branches. No aneurysm. HEART: Normal size. Severe coronary atherosclerosis. There is calcification of the mitral annulus, aortic annulus, and aortic valve. Metallic density within the left ventricle, difficult to characterize and localize in the absence of contrast. MEDIASTINUM AND JENELLE: No pathologically enlarged lymph nodes. LUNGS AND LARGE AIRWAYS: There is bronchiectasis, consolidation, and scarring in the right upper lobe with a linear border favoring prior right upper lobe radiation. Scattered tree-in-bud opacities throughout the bilateral lower lobes, left upper lobe, and lingula are present. PLEURA: No pleural effusion or pneumothorax.       Tree-in-bud opacities throughout the lungs is suspicious for bronchopneumonia.   Extensive coronary  atherosclerosis is present but no evidence of cardiomegaly, pulmonary edema, or pleural effusions.   Right upper lobe bronchiectasis and scarred consolidation are noted which can be seen in the setting of prior right upper lobe radiation treatment. If patient has prior lung malignancy treated with radiation, then recommend comparing with prior oncologic exams to ensure stability. If patient does not have prior radiation treatment, then recommend pulmonology consultation and further characterization with PET-CT.   Left adrenal gland nodule measuring 2.7 cm is indeterminate. If outside imaging is available, recommend comparison. Otherwise, recommend characterization with adrenal protocol CT.   Subtle nodular contour of the liver parenchyma raises suspicion for underlying liver disease or early cirrhosis.     MACRO: Critical Finding:  See findings. Notification was initiated on 7/13/2024 at 7:19 pm by  Cristiano Brown.  (**-YCF-**) Instructions:   Signed by: Cristiano Brown 7/13/2024 7:20 PM Dictation workstation:   [id number]          Assessment/Plan      Principal Problem:    Respiratory failure with hypoxia, unspecified chronicity (Multi)  Active Problems:    Lung cancer (Multi)    SANGEETHA Casas is a 73 y.o. male with past medical history of malignant neoplasm of unspecified part of right bronchus, s/p CT and immunotherapy (1 month ago PTA), sepsis, recurrent UTI, CKD stage IV, oropharyngeal dysphagia, asthma (no home oxygen) , BPH, hematuria, hypokalemia, CAD, anemia, neutropenia, HLD, early dementia, mood disturbance, anxiety. Presented for severe hypernatremia, decreased PO intake, PNA and COVID.      Acute conditions  # Acute on chronic  metabolic encephalopathy 2/2 PNA, covid   # dementia   - A & O x 1, holding hydroxyzine and Ativan   -Fall precautions     # COVID-positive s/p therapy   - on room air,   - CT chest positive for bronchopneumonia, MRSA positive.   - S/p Azithro, Vanc + Zosyn  completed 7/20   - S/p Remdesivir, taper Dex 2mg 7/19 and 1mg on 7/20  - On Spiriva Respimat daily and albuterol q6h PRN  - ICS, Acapella, chest PT      # Oropharyngeal dysphagia with decreased p.o. intake   - NPO with NG tube in for tube feeds.   - Aspiration precautions  - On bowel regimen for constipation  - Continue Protonix 40mg IV daily   - NG tube placed and tube feeds started.   - Tube feeds at goal 50 ml/hr  Surgery consulted for PEG, will place PEG when requested.   - palliative on board.       Resolved Medical Issues  # AHRF 2/2 HAP, resolved   # Multifocal pneumonia, resolved   # Recurrent UTI:   - Urine culture (7/13/24): MRSA. Completed vanc.   - Had Pickett for 2 months, was placed during last hospital stay in [place]. Exchanged 7/14, removed 7/18      Chronic conditions  # CAD &  HLD: Continue aspirin 81mg , rosuvastatin 10 mg  # L adrenal nodule: follow up with PCP   # BPH:  Holding Tamsulosin 0.4mg due to borderline BP. Restart when able.    # Liver nodule (possible cirrhosis/liver disease) : monitor  # Hx of anemia: holding po iron  # Mild hyperglycemia : 2/2 5% dextrose, No history of DM, On  SSI  # lung cancer hx : follow up outpatient      Fluid: FWF @ 300 every 4 hrs   Electrolytes: Replete PRN  Nutrition: Jevity 1.5 @ 50cc/hr    GI ppx: PPI   DVT/PE ppx: Heparin subcutaneous   Abx: Vanc + Zosyn completed 7/20  Lines/Tubes/Drains: Left chest Mediport, left sided midline (from prior to admission), PIV, NGT, Pickett removed 7/19  Oxygen: RA   Code: DNR and DNI and no ICU      Dispo: Plan for family meeting with hospice, awaiting family decision on that vs PEG tube.     Damian Wick DO

## 2024-07-23 NOTE — CARE PLAN
The patient's goals for the shift include  pt will be free from injury throughout shift    The clinical goals for the shift include Pt will remain HDS this shift      Problem: Safety - Adult  Goal: Free from fall injury  Outcome: Progressing     Problem: Skin  Goal: Decreased wound size/increased tissue granulation at next dressing change  Outcome: Progressing  Flowsheets (Taken 7/23/2024 1050)  Decreased wound size/increased tissue granulation at next dressing change: Protective dressings over bony prominences     Problem: Fall/Injury  Goal: Not fall by end of shift  Outcome: Progressing  Goal: Be free from injury by end of the shift  Outcome: Progressing  Goal: Verbalize understanding of personal risk factors for fall in the hospital  Outcome: Progressing  Goal: Verbalize understanding of risk factor reduction measures to prevent injury from fall in the home  Outcome: Progressing  Goal: Use assistive devices by end of the shift  Outcome: Progressing  Goal: Pace activities to prevent fatigue by end of the shift  Outcome: Progressing     Problem: Safety - Medical Restraint  Goal: Remains free of injury from restraints (Restraint for Interference with Medical Device)  Outcome: Progressing  Goal: Free from restraint(s) (Restraint for Interference with Medical Device)  Outcome: Progressing

## 2024-07-23 NOTE — CARE PLAN
The patient's goals for the shift include PERLITA    The clinical goals for the shift include pt will have improved mentation      Problem: Safety - Adult  Goal: Free from fall injury  Outcome: Progressing     Problem: Skin  Goal: Decreased wound size/increased tissue granulation at next dressing change  Outcome: Progressing  Flowsheets (Taken 7/22/2024 2135)  Decreased wound size/increased tissue granulation at next dressing change:   Promote sleep for wound healing   Protective dressings over bony prominences     Problem: Fall/Injury  Goal: Not fall by end of shift  Outcome: Progressing  Goal: Be free from injury by end of the shift  Outcome: Progressing  Goal: Verbalize understanding of personal risk factors for fall in the hospital  Outcome: Progressing  Goal: Verbalize understanding of risk factor reduction measures to prevent injury from fall in the home  Outcome: Progressing  Goal: Use assistive devices by end of the shift  Outcome: Progressing  Goal: Pace activities to prevent fatigue by end of the shift  Outcome: Progressing     Problem: Safety - Medical Restraint  Goal: Remains free of injury from restraints (Restraint for Interference with Medical Device)  Outcome: Progressing  Goal: Free from restraint(s) (Restraint for Interference with Medical Device)  Outcome: Progressing

## 2024-07-23 NOTE — NURSING NOTE
RN Hospice Note    Delmar Casas is a Hospice Patient.   Hospice terminal diagnosis: Lung CA  Physician: Ghanshyam  Visit type: SX Controll    Comments/recommendations: Met with pt family, reviewed hospice philosophy and services. Felecia Mejia agreeable to Hospice with [facility],  Bird signed consents and HES, felecia agreeable to DC NG, Pleasure Feeds and IV, Agreeable to General Inpt at [facility] for SX management. Orders received from Dr Tobar. Potential for pt to be dc to home. [facility] Nurse tomorrow.     Discharge Planning:  Patient to be discharged to  General Inpt at [facility] for SX management    The following is to be completed:  Discharge order: Admit to General Inpt  State DNR signed by MD: Completed  Nursing facility referral/transfer form: NA  Medication reconciliation: Completed  PAS/RR or convalescent stay form: NA  Prescriptions for al narcotics/new medications: NA  Transportation: NA  Other: NA    Plan of care reviewed with patient/family members Michael Mejia. [facility]   Plan of care reviewed with hospital staff members: Radha Potter, [facility]     Please notify [facility] of any changes in condition. Thank you.  Office: [phone] (8 am-6:30 pm M-F and 8 am-4:30 pm weekends and holidays)   [phone] (6:30 pm-8 am M-F and 4:30 pm-8 am weekends and holidays)    Oanh Wray RN

## 2024-07-23 NOTE — H&P
History Of Present Illness  Delmar Casas is a 73 y.o. male with past medical history of malignant neoplasm of unspecified  part of right bronchus, s/p CT and immunotherapy(1 month ago), sepsis, recurrent UTI, CKD stage IV, oropharyngeal dysphagia, asthma (no home oxygen) , BPH, hematuria, hypokalemia, CAD, anemia, neutropenia, HLD, early dementia, mood disturbance, anxiety admitted with AHRF 2/2 PNA, COVID positive, severe hypernatremia and LUPILLO on CKD.     He was brought from [place] by family recently to [place] where they found him hypernatremic and with AMS. Before that he was being treated for “sepsis” at a [place] hospital and got IV abx in the last 2 months. He had evidence of PNA on imaging and was treated with azithro, vanc and Zosyn. Also MRSA and COVID positive, started on dexamethasone and remdesivir. Nephrology was consulted and recommended continuing D5W for the hypernatremia which improved throughout the course of admission. SLP was ordered but unable to be performed due to patient's mental status and an NGT was placed and tube feeds were started. Palliative care was consulted and a meeting was scheduled with [facility] for 7/23. His giron was removed on 7/18. General surgery was consulted for PEG tube placement. Patient was medically stable for transfer to regular nursing floor.      Pt ripped off NGT and is a poor candidate for PEG tube due to AMS. Hospice meeting with pt and family Tuesday.     Family underwent hospice meeting on 7/23, family elected to go comfort care. Comfort care measures in place, DNR-CC paperwork signed. Patient will be discharged and readmitted to [facility] hospice.     Past Medical History  He has no past medical history on file.    Surgical History  He has no past surgical history on file.     Social History  He reports that he has quit smoking. His smoking use included cigarettes. He does not have any smokeless tobacco history on file. No history on file for alcohol use and drug  use.    Family History  No family history on file.     Allergies  Patient has no known allergies.     Physical Exam  Vitals reviewed.   Constitutional:       General: He is not in acute distress.  Cardiovascular:      Rate and Rhythm: Normal rate and regular rhythm.      Heart sounds: Normal heart sounds. No murmur heard.  Pulmonary:      Effort: No respiratory distress.      Breath sounds: Normal breath sounds. No wheezing.   Abdominal:      General: There is no distension.      Palpations: Abdomen is soft.      Tenderness: There is no abdominal tenderness.   Musculoskeletal:         General: No tenderness.      Right lower leg: No edema.      Left lower leg: No edema.   Neurological:      Mental Status: He is disoriented.      Comments: A and O x1, only alert to self, has been resistant with medical care          Last Recorded Vitals  There were no vitals taken for this visit.    Relevant Results  Scheduled medications  LORazepam, 0.5 mg, intravenous, q6h SVITLANA  morphine, 2 mg, intravenous, q8h      Continuous medications     PRN medications  PRN medications: acetaminophen **OR** acetaminophen **OR** acetaminophen, bisacodyl, haloperidol, LORazepam, morphine         Assessment/Plan   Delmar Casas is a 73 year old male admitted to [facility] hospice.    Active Issues  #Hospice care  - DNR-CC paperwork signed by family after hospice meeting today  - Comfort care meds in place, can take out midline and NG Tube but keep port  - If patient rips out IV, can convert IV meds to sublingual  - Palliative and Hospice teams to follow    Dispo: 73 year old male admitted to [facility] hospice. Pending family decision about going home with hospice vs hospice at facility.      Shivam Tobar MD

## 2024-07-23 NOTE — DISCHARGE SUMMARY
Discharge Diagnosis  Acute hypoxic respiratory failure 2/2 COVID-19 and multifocal pneumonia  Hypernatremia  LUPILLO on CKD  Acute on chronic metabolic encephalopathy  Oropharyngeal dysphagia  Dementia  Hospice patient    Issues Requiring Follow-Up  Admit to [facility] hospice    Discharge Meds     Your medication list        ASK your doctor about these medications        Instructions Last Dose Given Next Dose Due   acetaminophen 325 mg tablet  Commonly known as: Tylenol           aspirin 81 mg EC tablet           cefTRIAXone 1 gram  Commonly known as: Rocephin  Ask about: Should I take this medication?           Dulcolax (bisacodyl) 10 mg suppository  Generic drug: bisacodyl           ferrous fumarate 324 mg (106 mg iron) tablet           Fleet Enema 19-7 gram/118 mL enema enema  Generic drug: sodium phosphates           hydrOXYzine HCL 25 mg tablet  Commonly known as: Atarax           LORazepam 0.5 mg tablet  Commonly known as: Ativan           magnesium hydroxide 400 mg/5 mL suspension  Commonly known as: Milk of Magnesia           mirtazapine 7.5 mg tablet  Commonly known as: Remeron           potassium chloride CR 20 mEq ER tablet  Commonly known as: Klor-Con M20           rosuvastatin 10 mg tablet  Commonly known as: Crestor           tamsulosin 0.4 mg 24 hr capsule  Commonly known as: Flomax                    Test Results Pending At Discharge  Pending Labs       No current pending labs.            Hospital Course  Delmar Casas is a 73 y.o. male with past medical history of malignant neoplasm of unspecified  part of right bronchus, s/p CT and immunotherapy(1 month ago), sepsis, recurrent UTI, CKD stage IV, oropharyngeal dysphagia, asthma (no home oxygen) , BPH, hematuria, hypokalemia, CAD, anemia, neutropenia, HLD, early dementia, mood disturbance, anxiety admitted with AHRF 2/2 PNA, COVID positive, severe hypernatremia and LUPILLO on CKD.    He was brought from [place] by family recently to [place] where they found him  hypernatremic and with AMS. Before that he was being treated for “sepsis” at a [facility] and got IV abx in the last 2 months. He had evidence of PNA on imaging and was treated with azithro, vanc and Zosyn. Also MRSA and COVID positive, started on dexamethasone and remdesivir. Nephrology was consulted and recommended continuing D5W for the hypernatremia which improved throughout the course of admission. SLP was ordered but unable to be performed due to patient's mental status and an NGT was placed and tube feeds were started. Palliative care was consulted and a meeting was scheduled with [facility] for 7/23. His giron was removed on 7/18. General surgery was consulted for PEG tube placement. Patient was medically stable for transfer to regular nursing floor.     Pt ripped off NGT and is a poor candidate for PEG tube due to AMS. Hospice meeting with pt and family Tuesday.    Family underwent hospice meeting on 7/23, family elected to go comfort care. Comfort care measures in place, DNR-CC paperwork signed. Patient will be discharged and readmitted to [facility] hospice.    Pertinent Physical Exam At Time of Discharge  Physical Exam  Vitals reviewed.   Constitutional:       General: He is not in acute distress.  Cardiovascular:      Rate and Rhythm: Normal rate and regular rhythm.      Heart sounds: Normal heart sounds. No murmur heard.  Abdominal:      General: There is no distension.      Palpations: Abdomen is soft.      Tenderness: There is no abdominal tenderness.   Musculoskeletal:         General: No tenderness.      Right lower leg: No edema.      Left lower leg: No edema.   Neurological:      Mental Status: He is disoriented.      Comments: Oriented to self only, has been resistant with medical care. Requiring restraints due to agitation         Outpatient Follow-Up  No future appointments.      Shivam Tobar MD

## 2024-07-23 NOTE — NURSING NOTE
"Called to patient room for very small skin tears on bilateral hands fingers x 3, <1 cm in size. Orders obtained for xeroform and small island dressing, care provided.  Patient uncomfortable, states \"my back\", able to have Graham LOWRY assist in changing premium pro pad to ensure wrinkle free and boost in bed, to ensure tube feed is appropriate for HOB placement.  Patient comfortable.  Bilateral wrist restraints replaced after ROM.  After finishing Radha SERVIN, asked question rt nose wound, patient has NG tube and Radha stated tape is over wound.  Previous wound RN  note states \"may leave nose wound open to air\".  Conveyed this information to Radha SERVIN.  "

## 2024-07-23 NOTE — NURSING NOTE
7a-7p- Wound care completed. Pt agitated and attempting to hit and kick during care. Reached out to wound nurse regarding area on pt nose. Awaiting new orders.

## 2024-07-24 NOTE — PROGRESS NOTES
Delmar Casas is a 73 y.o. male on day 1 of admission presenting with No Principal Problem: There is no principal problem currently on the Problem List. Please update the Problem List and refresh.    Subjective   Mr. Casas was less oriented this morning. Patient was woken up but he was not localizing to me.       Objective     Last Recorded Vitals  /69 (BP Location: Right arm, Patient Position: Lying)   Pulse 85   Temp 36.4 °C (97.5 °F) (Temporal)   Resp 16   Wt 69.8 kg (153 lb 14.1 oz)   SpO2 95%   Intake/Output last 3 Shifts:    Intake/Output Summary (Last 24 hours) at 7/24/2024 1411  Last data filed at 7/24/2024 1300  Gross per 24 hour   Intake 0 ml   Output 1000 ml   Net -1000 ml       Admission Weight  Weight: 69.8 kg (153 lb 14.1 oz) (07/24/24 0438)    Daily Weight  07/24/24 : 69.8 kg (153 lb 14.1 oz)    Image Results  XR abdomen 1 view  Narrative: Interpreted By:  Ricco Boone,   STUDY:  XR ABDOMEN 1 VIEW;  7/20/2024 5:51 pm      INDICATION:  Signs/Symptoms:Confirm Dobhoff placement.      COMPARISON:  None.      ACCESSION NUMBER(S):  [id number]      ORDERING CLINICIAN:  HEIDI KELLER      FINDINGS:  Dobbhoff tube tip projects over the gastric body at the mid abdomen.  Nonobstructive bowel gas pattern. Limited evaluation of  pneumoperitoneum on supine imaging, however no gross evidence of free  air is noted.      Visualized lungs are clear.      Osseous structures demonstrate no acute bony changes.      Impression: 1. Dobbhoff tube tip projects over the mid abdomen over the region of  the gastric body      MACRO:  None      Signed by: Ricco Boone 7/20/2024 7:03 PM  Dictation workstation:   [id number]      Physical Exam  Constitutional:       Appearance: He is ill-appearing.   HENT:      Head: Normocephalic.      Nose: Nose normal.      Mouth/Throat:      Mouth: Mucous membranes are moist.   Eyes:      Extraocular Movements: Extraocular movements intact.      Conjunctiva/sclera:  Conjunctivae normal.      Pupils: Pupils are equal, round, and reactive to light.   Cardiovascular:      Rate and Rhythm: Normal rate and regular rhythm.      Pulses: Normal pulses.      Heart sounds: Normal heart sounds.   Pulmonary:      Breath sounds: Wheezing and rales present.   Abdominal:      General: Abdomen is flat. Bowel sounds are normal.      Palpations: Abdomen is soft.   Musculoskeletal:         General: Normal range of motion.   Skin:     General: Skin is warm.   Neurological:      Mental Status: He is disoriented.      Comments: A&Ox0         Relevant Results             Scheduled medications  heparin flush, 50 Units, intra-catheter, q12h  LORazepam, 0.5 mg, intravenous, q6h SVITLANA  morphine, 2 mg, intravenous, q8h      Continuous medications     PRN medications  PRN medications: acetaminophen **OR** acetaminophen **OR** acetaminophen, alteplase, bisacodyl, haloperidol, heparin flush, LORazepam, morphine  Results for orders placed or performed during the hospital encounter of 07/13/24 (from the past 24 hour(s))   POCT GLUCOSE   Result Value Ref Range    POCT Glucose 122 (H) 74 - 99 mg/dL     XR abdomen 1 view    Result Date: 7/20/2024  Interpreted By:  Ricco Boone, STUDY: XR ABDOMEN 1 VIEW;  7/20/2024 5:51 pm   INDICATION: Signs/Symptoms:Confirm Dobhoff placement.   COMPARISON: None.   ACCESSION NUMBER(S): [id number]   ORDERING CLINICIAN: HEIDI KELLER   FINDINGS: Dobbhoff tube tip projects over the gastric body at the mid abdomen. Nonobstructive bowel gas pattern. Limited evaluation of pneumoperitoneum on supine imaging, however no gross evidence of free air is noted.   Visualized lungs are clear.   Osseous structures demonstrate no acute bony changes.       1. Dobbhoff tube tip projects over the mid abdomen over the region of the gastric body   MACRO: None   Signed by: Ricco Boone 7/20/2024 7:03 PM Dictation workstation:   [id number]    ECG 12 Lead    Result Date: 7/16/2024  Sinus  tachycardia with Premature supraventricular complexes ST & T wave abnormality, consider inferior ischemia Abnormal ECG No previous ECGs available See ED provider note for full interpretation and clinical correlation Confirmed by Rica Yousif ([id number]) on 7/16/2024 1:41:10 PM    XR chest 1 view    Result Date: 7/16/2024  Interpreted By:  Malou Jaquez, STUDY: XR CHEST 1 VIEW;  7/16/2024 11:41 am   INDICATION: Signs/Symptoms:NG placement.   COMPARISON: None.   ACCESSION NUMBER(S): [id number]   ORDERING CLINICIAN: STEFAN DURANT   TECHNIQUE: 2 AP portable images of the chest were obtained.   FINDINGS: The heart is normal in size.   There is no obvious consolidation or pleural fluid.   A port is present on the left with the tip in the region of the superior vena cava.   There are atherosclerotic changes of the aorta.   A nasogastric tube terminates in the region of the stomach.   There are degenerative changes of the spine   COMPARISON OF FINDING:       Nasogastric tube with the tip in the region of the stomach.   No acute cardiopulmonary disease.   MACRO: none   Signed by: Malou Jaquez 7/16/2024 1:06 PM Dictation workstation:   [id number]    ECG 12 lead    Result Date: 7/15/2024  Accelerated Junctional rhythm Low voltage QRS Nonspecific T wave abnormality Abnormal ECG When compared with ECG of 13-JUL-2024 18:20, (unconfirmed) Junctional rhythm has replaced Sinus rhythm Vent. rate has decreased BY  44 BPM Non-specific change in ST segment in Inferior leads ST no longer depressed in Lateral leads Nonspecific T wave abnormality has replaced inverted T waves in Inferior leads    CT angio chest for pulmonary embolism    Result Date: 7/14/2024  Interpreted By:  Lux Palacio, STUDY: CT ANGIO CHEST FOR PULMONARY EMBOLISM;  7/14/2024 2:39 am   INDICATION: Signs/Symptoms:to exclude PE.   COMPARISON: 7/13/2024   ACCESSION NUMBER(S): [id number]   ORDERING CLINICIAN: POOJA DARBY   TECHNIQUE: Contiguous axial images of  the chest were obtained after the intravenous administration of  contrast. Coronal and sagittal reformatted images were obtained from the axial images. MIPS of the chest were also performed and reviewed.   FINDINGS: The examination is limited secondary to motion and beam hardening artifact secondary to inferior position of the patient's arms.   No axillary lymphadenopathy. 1.6 cm subcarinal lymph node. The heart is normal in size. Coronary artery atherosclerotic calcifications. No significant pericardial effusion. No evidence of acute central, main, lobar or proximal segmental pulmonary embolism.   Evaluation of the lungs is limited secondary to respiratory motion. There is redemonstration of masslike opacity in the medial right lung abutting the mediastinum which measures 4 cm x 1.5 cm in greatest axial dimensions. There nodular in size opacities posterior and superior to this opacity. There are mild consolidative opacity in the right lower lobe and in the left upper lobe and lingula compatible with pneumonia. Small nodular opacities also again noted.   Limited evaluation of the upper abdomen.   Multilevel degenerative change of the thoracic spine.       No evidence of acute pulmonary embolism.   Redemonstration of masslike opacity in the medial right upper lung abutting the mediastinum which measures 4 cm x 1.5 cm underlying malignancy is not excluded and comparison with outside prior imaging recommended and attention on short-term progress imaging.   Nodular opacities are again noted posterior and superior to the described masslike opacity. There is interval development of mild consolidative opacities in the right lower lobe and also in the left upper lobe and lingula compatible with pneumonia. There is also minimal opacity in the medial left lower lobe. Small nodular opacities are also again noted. Short-term progress imaging recommended for further evaluation.   MACRO: None   Signed by: Lux Palacio 7/14/2024  3:33 AM Dictation workstation:   [id number]    Lower extremity venous duplex bilateral    Result Date: 7/14/2024  Interpreted By:  Lux Palacio, STUDY: VAS US LOWER EXTREMITY VENOUS DUPLEX BILATERAL;  7/14/2024 1:32 am   INDICATION: Signs/Symptoms:High D-dimer, history of lung cancer with CT, COVID-positive, on Airvo.   COMPARISON: None.   ACCESSION NUMBER(S): [id number]   ORDERING CLINICIAN: POOJA DARBY   TECHNIQUE: Vascular ultrasound of the bilateral lower extremities was performed. Real-time compression views as well as Gray scale, color Doppler and spectral Doppler waveform analysis was performed.   FINDINGS: Evaluation of the visualized portions of the bilateral common femoral vein, proximal, mid, and distal femoral vein, and popliteal vein were performed.  Evaluation of the visualized portions of the  posterior tibial and peroneal veins were also performed.   The evaluated veins demonstrate normal compressibility. There is intact venous flow demonstrating normal respiratory variability and normal augmentation of flow with calf compression. Therefore, there is no ultrasonographic evidence for deep vein thrombosis within the evaluated veins.       No sonographic evidence for deep vein thrombosis within the evaluated veins of the bilateral lower extremity.   MACRO: None   Signed by: Lux Palacio 7/14/2024 1:33 AM Dictation workstation:   [id number]    CT head wo IV contrast    Result Date: 7/13/2024  Interpreted By:  Segun Ford, STUDY: CT HEAD WO IV CONTRAST;  7/13/2024 8:22 pm   INDICATION: Signs/Symptoms:AMS.   COMPARISON: None.   ACCESSION NUMBER(S): [id number]   ORDERING CLINICIAN: RYAN CALVILLO   TECHNIQUE: Noncontrast axial CT scan of head was performed.   FINDINGS: Parenchyma: There is no intracranial hemorrhage. The grey-white differentiation is intact. There is no mass effect or midline shift. Encephalomalacia left occipital lobe from prior infarct. Superimposed confluent  supratentorial hypodensity, nonspecific, but likely secondary to chronic microvascular ischemia.   CSF Spaces: Mild generalized volume loss with concordant ventriculomegaly.   Extra-Axial Fluid: There is no extraaxial fluid collection.   Calvarium: The calvarium is unremarkable.   Paranasal sinuses: Moderate to severe diffuse paranasal sinus mucosal thickening.   Mastoids: Clear.   Orbits: Bilateral lens replacements   Soft tissues: Unremarkable.       No acute intracranial hemorrhage, mass effect, or CT apparent acute infarct. Chronic microvascular ischemia, sequela of prior left occipital lobe infarct and involutional changes.   Signed by: Segun Ford 7/13/2024 8:57 PM Dictation workstation:   [id number]    CT chest wo IV contrast    Result Date: 7/13/2024  Interpreted By:  Cristiano Brown, STUDY: CT CHEST WO IV CONTRAST;  7/13/2024 6:39 pm   INDICATION: Signs/Symptoms:Coarse breath sounds heard bilateral possible CHF versus pneumonia.   COMPARISON: None.   ACCESSION NUMBER(S): [id number]   ORDERING CLINICIAN: RYAN CALVILLO   TECHNIQUE: Axial CT images of the chest obtained without contrast.   FINDINGS: BONES: No acute osseous abnormality. Diffuse osseous demineralization. Chronic healed rib fracture deformities. LOWER NECK: Unremarkable. CHEST WALL: Left chest port with catheter tip in the mid SVC. UPPER ABDOMEN: Left adrenal gland nodule measuring 2.5 x 2.7 cm with internal Hounsfield unit of 24, indeterminate atrophic kidneys with vascular calcifications, likely chronic medical renal disease. Mildly distended gallbladder but no surrounding inflammation. Questionable nodular contour of the liver parenchyma raising suspicion for underlying liver disease.   VESSELS: Calcification of the aortic arch and its branches. No aneurysm. HEART: Normal size. Severe coronary atherosclerosis. There is calcification of the mitral annulus, aortic annulus, and aortic valve. Metallic density within the left ventricle,  difficult to characterize and localize in the absence of contrast. MEDIASTINUM AND JENELLE: No pathologically enlarged lymph nodes. LUNGS AND LARGE AIRWAYS: There is bronchiectasis, consolidation, and scarring in the right upper lobe with a linear border favoring prior right upper lobe radiation. Scattered tree-in-bud opacities throughout the bilateral lower lobes, left upper lobe, and lingula are present. PLEURA: No pleural effusion or pneumothorax.       Tree-in-bud opacities throughout the lungs is suspicious for bronchopneumonia.   Extensive coronary atherosclerosis is present but no evidence of cardiomegaly, pulmonary edema, or pleural effusions.   Right upper lobe bronchiectasis and scarred consolidation are noted which can be seen in the setting of prior right upper lobe radiation treatment. If patient has prior lung malignancy treated with radiation, then recommend comparing with prior oncologic exams to ensure stability. If patient does not have prior radiation treatment, then recommend pulmonology consultation and further characterization with PET-CT.   Left adrenal gland nodule measuring 2.7 cm is indeterminate. If outside imaging is available, recommend comparison. Otherwise, recommend characterization with adrenal protocol CT.   Subtle nodular contour of the liver parenchyma raises suspicion for underlying liver disease or early cirrhosis.     MACRO: Critical Finding:  See findings. Notification was initiated on 7/13/2024 at 7:19 pm by  Cristiano Brown.  (**-YCF-**) Instructions:   Signed by: Cristiano Brown 7/13/2024 7:20 PM Dictation workstation:   [id number]     Assessment/Plan      Active Problems:  There are no active Hospital Problems.    Delmar Casas is a 73 year old male admitted to [facility] hospice.     Active Issues  #Hospice care  - DNR-CC paperwork signed by family after hospice meeting yesterday  - Comfort care meds in place, midline and NG Tube removed. Kept port  - If patient rips out IV, can  convert IV meds to sublingual  - Palliative and Hospice teams to follow     Dispo: 73 year old male admitted to [facility] hospice. Pending family decision about going home with hospice vs hospice at facility.          Damian Wick DO

## 2024-07-24 NOTE — CONSULTS
"Nutrition Assessment Note  Nutrition Assessment      Reason for Assessment  Reason for Assessment: Admission nursing screening (unsure of weight loss, wounds)    History:  Food and Nutrient History  Food and Nutrient History: Pt discharged 7/23/24 and then re-admitted under hospice inpatient 7/23/24. Nutrition intervention not indicated at this time. If there is a change in patient status, please re-consult.      Anthropometrics:  Height: 177.8 cm (5' 10\")  Weight: 69.8 kg (153 lb 14.1 oz)  BMI (Calculated): 22.08       Follow Up  Time Spent (min): 15 minutes  Last Date of Nutrition Visit: 07/24/24  Nutrition Follow-Up Needed?: Dietitian to reassess per policy  Follow up Comment: Hospice-inpatient       "

## 2024-07-24 NOTE — NURSING NOTE
1006 morphine/hep flush administered one time. Epic has saved multiple scans/no scans. Each given once.

## 2024-07-24 NOTE — HOSPITAL COURSE
Delmar Casas is a 73 y.o. male with past medical history of malignant neoplasm of unspecified  part of right bronchus, s/p CT and immunotherapy(1 month ago), sepsis, recurrent UTI, CKD stage IV, oropharyngeal dysphagia, asthma (no home oxygen) , BPH, hematuria, hypokalemia, CAD, anemia, neutropenia, HLD, early dementia, mood disturbance, anxiety admitted with AHRF 2/2 PNA, COVID positive, severe hypernatremia and LUPILLO on CKD.     He was brought from [place] by family recently to [place] where they found him hypernatremic and with AMS. Before that he was being treated for “sepsis” at a [place] hospital and got IV abx in the last 2 months. He had evidence of PNA on imaging and was treated with azithro, vanc and Zosyn. Also MRSA and COVID positive, started on dexamethasone and remdesivir. Nephrology was consulted and recommended continuing D5W for the hypernatremia which improved throughout the course of admission. SLP was ordered but unable to be performed due to patient's mental status and an NGT was placed and tube feeds were started. Palliative care was consulted and a meeting was scheduled with [facility] for 7/23. His giron was removed on 7/18. General surgery was consulted for PEG tube placement. Patient was medically stable for transfer to regular nursing floor.      Pt ripped off NGT and is a poor candidate for PEG tube due to AMS. Hospice meeting with pt and family Tuesday.     Family underwent hospice meeting on 7/23, family elected to go comfort care. Comfort care measures in place, DNR-CC paperwork signed. Patient will be discharged and readmitted to [facility] hospice.  The family initially planned to discharge to hospice but the pt was not stable to leave the hospital. Mr. Casas was declared dead at 1022.

## 2024-07-24 NOTE — PROGRESS NOTES
07/24/24 1000   Discharge Planning   Living Arrangements Other (Comment)   Support Systems Children;Family members   Type of Residence Private residence   Home or Post Acute Services Community services   Expected Discharge Disposition Hospice Res  (Patient is [facility] hosp[facility] with [facility]- discharge planning per [facility]. Home with family vs return to [facility])   Does the patient need discharge transport arranged? Yes   RoundTrip coordination needed? Yes   Has discharge transport been arranged? No

## 2024-07-25 NOTE — SIGNIFICANT EVENT
Pt has had escalating symptoms of restlessness, dyspnea, and terminal secretions throughout the day.   Updated orders to increase lorazepam to 1mg Q4 hrs IV and PRN and Morphine updated to 2mg Q4hrs and PRN  Scopolamine added for secretion mgmt   Bladder scan was only noted to be 80ml around 12pm; nursing following this closely for need to intervene  Discussed with the nurse and family.   Education provided to the family about terminal symptoms, care plan, and that the pt may not be able to get home given symptoms are not managed adequately at this time and condition with terminal secretions which suggests that pt will likely pass away over the next 48-72 hrs.    07/25/24 at 5:00 PM - VIOLETA Goodman-CNP

## 2024-07-25 NOTE — SIGNIFICANT EVENT
Pt appears comfortable on new meds.   Pickett will be inserted for urine retention now at 200ml  Education provided to family on what to expect.   Reviewed life expectancy with them per their request which is hrs to days, likely less than 72 hrs.   Advised that hospice would be in to assess pt in the AM for possible DC plan, but did prepare family for the possibility that pt may not make it home based on progressing symptoms today.  Family verbalized understanding.   Updated medical team.

## 2024-07-25 NOTE — PROGRESS NOTES
Delmar Casas is a 73 y.o. male on day 2 of admission presenting with No Principal Problem: There is no principal problem currently on the Problem List. Please update the Problem List and refresh..      Subjective   Mr. Casas was somnolent this morning.        Objective     Last Recorded Vitals  /51 (BP Location: Right arm, Patient Position: Lying)   Pulse 93   Temp 36.6 °C (97.9 °F) (Temporal)   Resp 16   Wt 66.8 kg (147 lb 4.3 oz)   SpO2 95%   Intake/Output last 3 Shifts:    Intake/Output Summary (Last 24 hours) at 7/25/2024 1300  Last data filed at 7/25/2024 1223  Gross per 24 hour   Intake 80 ml   Output 200 ml   Net -120 ml       Admission Weight  Weight: 69.8 kg (153 lb 14.1 oz) (07/24/24 0438)    Daily Weight  07/25/24 : 66.8 kg (147 lb 4.3 oz)    Image Results  XR abdomen 1 view  Narrative: Interpreted By:  Ricco Boone,   STUDY:  XR ABDOMEN 1 VIEW;  7/20/2024 5:51 pm      INDICATION:  Signs/Symptoms:Confirm Dobhoff placement.      COMPARISON:  None.      ACCESSION NUMBER(S):  [id number]      ORDERING CLINICIAN:  HEIDI KELLER      FINDINGS:  Dobbhoff tube tip projects over the gastric body at the mid abdomen.  Nonobstructive bowel gas pattern. Limited evaluation of  pneumoperitoneum on supine imaging, however no gross evidence of free  air is noted.      Visualized lungs are clear.      Osseous structures demonstrate no acute bony changes.      Impression: 1. Dobbhoff tube tip projects over the mid abdomen over the region of  the gastric body      MACRO:  None      Signed by: Ricco Boone 7/20/2024 7:03 PM  Dictation workstation:   [id number]      Physical Exam  Constitutional:       Appearance: He is normal weight.      Comments: Somnolent   HENT:      Head: Normocephalic.      Nose: Nose normal.      Mouth/Throat:      Mouth: Mucous membranes are moist.   Eyes:      Extraocular Movements: Extraocular movements intact.      Conjunctiva/sclera: Conjunctivae normal.      Pupils:  Pupils are equal, round, and reactive to light.   Cardiovascular:      Rate and Rhythm: Normal rate and regular rhythm.      Pulses: Normal pulses.      Heart sounds: Normal heart sounds.   Pulmonary:      Effort: Pulmonary effort is normal.      Breath sounds: Rales present.   Abdominal:      General: Abdomen is flat. Bowel sounds are normal.      Palpations: Abdomen is soft.   Musculoskeletal:         General: Normal range of motion.   Skin:     General: Skin is warm.         Relevant Results             Scheduled medications  bisacodyl, 10 mg, rectal, Once  glycopyrrolate, 0.2 mg, intravenous, 4x daily  heparin flush, 50 Units, intra-catheter, q12h  LORazepam, 0.5 mg, intravenous, q4h  morphine, 2 mg, intravenous, q6h  oxygen, , inhalation, Continuous - Inhalation      Continuous medications     PRN medications  PRN medications: acetaminophen **OR** acetaminophen **OR** acetaminophen, alteplase, bisacodyl, haloperidol, heparin flush, LORazepam, morphine  XR abdomen 1 view    Result Date: 7/20/2024  Interpreted By:  Ricco Boone, STUDY: XR ABDOMEN 1 VIEW;  7/20/2024 5:51 pm   INDICATION: Signs/Symptoms:Confirm Dobhoff placement.   COMPARISON: None.   ACCESSION NUMBER(S): [id number]   ORDERING CLINICIAN: HEIDI KELLER   FINDINGS: Dobbhoff tube tip projects over the gastric body at the mid abdomen. Nonobstructive bowel gas pattern. Limited evaluation of pneumoperitoneum on supine imaging, however no gross evidence of free air is noted.   Visualized lungs are clear.   Osseous structures demonstrate no acute bony changes.       1. Dobbhoff tube tip projects over the mid abdomen over the region of the gastric body   MACRO: None   Signed by: Ricco Boone 7/20/2024 7:03 PM Dictation workstation:   [id number]    ECG 12 Lead    Result Date: 7/16/2024  Sinus tachycardia with Premature supraventricular complexes ST & T wave abnormality, consider inferior ischemia Abnormal ECG No previous ECGs available See ED  provider note for full interpretation and clinical correlation Confirmed by Rica Yousif ([id number]) on 7/16/2024 1:41:10 PM    XR chest 1 view    Result Date: 7/16/2024  Interpreted By:  Malou Jaquez, STUDY: XR CHEST 1 VIEW;  7/16/2024 11:41 am   INDICATION: Signs/Symptoms:NG placement.   COMPARISON: None.   ACCESSION NUMBER(S): [id number]   ORDERING CLINICIAN: STEFAN DURANT   TECHNIQUE: 2 AP portable images of the chest were obtained.   FINDINGS: The heart is normal in size.   There is no obvious consolidation or pleural fluid.   A port is present on the left with the tip in the region of the superior vena cava.   There are atherosclerotic changes of the aorta.   A nasogastric tube terminates in the region of the stomach.   There are degenerative changes of the spine   COMPARISON OF FINDING:       Nasogastric tube with the tip in the region of the stomach.   No acute cardiopulmonary disease.   MACRO: none   Signed by: Maluo Jaquez 7/16/2024 1:06 PM Dictation workstation:   [id number]    ECG 12 lead    Result Date: 7/15/2024  Accelerated Junctional rhythm Low voltage QRS Nonspecific T wave abnormality Abnormal ECG When compared with ECG of 13-JUL-2024 18:20, (unconfirmed) Junctional rhythm has replaced Sinus rhythm Vent. rate has decreased BY  44 BPM Non-specific change in ST segment in Inferior leads ST no longer depressed in Lateral leads Nonspecific T wave abnormality has replaced inverted T waves in Inferior leads    CT angio chest for pulmonary embolism    Result Date: 7/14/2024  Interpreted By:  Lux Palacio, STUDY: CT ANGIO CHEST FOR PULMONARY EMBOLISM;  7/14/2024 2:39 am   INDICATION: Signs/Symptoms:to exclude PE.   COMPARISON: 7/13/2024   ACCESSION NUMBER(S): [id number]   ORDERING CLINICIAN: POOJA DARBY   TECHNIQUE: Contiguous axial images of the chest were obtained after the intravenous administration of  contrast. Coronal and sagittal reformatted images were obtained from the axial images.  MIPS of the chest were also performed and reviewed.   FINDINGS: The examination is limited secondary to motion and beam hardening artifact secondary to inferior position of the patient's arms.   No axillary lymphadenopathy. 1.6 cm subcarinal lymph node. The heart is normal in size. Coronary artery atherosclerotic calcifications. No significant pericardial effusion. No evidence of acute central, main, lobar or proximal segmental pulmonary embolism.   Evaluation of the lungs is limited secondary to respiratory motion. There is redemonstration of masslike opacity in the medial right lung abutting the mediastinum which measures 4 cm x 1.5 cm in greatest axial dimensions. There nodular in size opacities posterior and superior to this opacity. There are mild consolidative opacity in the right lower lobe and in the left upper lobe and lingula compatible with pneumonia. Small nodular opacities also again noted.   Limited evaluation of the upper abdomen.   Multilevel degenerative change of the thoracic spine.       No evidence of acute pulmonary embolism.   Redemonstration of masslike opacity in the medial right upper lung abutting the mediastinum which measures 4 cm x 1.5 cm underlying malignancy is not excluded and comparison with outside prior imaging recommended and attention on short-term progress imaging.   Nodular opacities are again noted posterior and superior to the described masslike opacity. There is interval development of mild consolidative opacities in the right lower lobe and also in the left upper lobe and lingula compatible with pneumonia. There is also minimal opacity in the medial left lower lobe. Small nodular opacities are also again noted. Short-term progress imaging recommended for further evaluation.   MACRO: None   Signed by: Lux Palacio 7/14/2024 3:33 AM Dictation workstation:   [id number]    Lower extremity venous duplex bilateral    Result Date: 7/14/2024  Interpreted By:  Lux Palacio,  STUDY: [facility] US LOWER EXTREMITY VENOUS DUPLEX BILATERAL;  7/14/2024 1:32 am   INDICATION: Signs/Symptoms:High D-dimer, history of lung cancer with CT, COVID-positive, on Airvo.   COMPARISON: None.   ACCESSION NUMBER(S): [id number]   ORDERING CLINICIAN: POOJA DARBY   TECHNIQUE: Vascular ultrasound of the bilateral lower extremities was performed. Real-time compression views as well as Gray scale, color Doppler and spectral Doppler waveform analysis was performed.   FINDINGS: Evaluation of the visualized portions of the bilateral common femoral vein, proximal, mid, and distal femoral vein, and popliteal vein were performed.  Evaluation of the visualized portions of the  posterior tibial and peroneal veins were also performed.   The evaluated veins demonstrate normal compressibility. There is intact venous flow demonstrating normal respiratory variability and normal augmentation of flow with calf compression. Therefore, there is no ultrasonographic evidence for deep vein thrombosis within the evaluated veins.       No sonographic evidence for deep vein thrombosis within the evaluated veins of the bilateral lower extremity.   MACRO: None   Signed by: Lux Palacio 7/14/2024 1:33 AM Dictation workstation:   [id number]    CT head wo IV contrast    Result Date: 7/13/2024  Interpreted By:  Segun Ford, STUDY: CT HEAD WO IV CONTRAST;  7/13/2024 8:22 pm   INDICATION: Signs/Symptoms:AMS.   COMPARISON: None.   ACCESSION NUMBER(S): [id number]   ORDERING CLINICIAN: RYAN CALVILLO   TECHNIQUE: Noncontrast axial CT scan of head was performed.   FINDINGS: Parenchyma: There is no intracranial hemorrhage. The grey-white differentiation is intact. There is no mass effect or midline shift. Encephalomalacia left occipital lobe from prior infarct. Superimposed confluent supratentorial hypodensity, nonspecific, but likely secondary to chronic microvascular ischemia.   CSF Spaces: Mild generalized volume loss with concordant  ventriculomegaly.   Extra-Axial Fluid: There is no extraaxial fluid collection.   Calvarium: The calvarium is unremarkable.   Paranasal sinuses: Moderate to severe diffuse paranasal sinus mucosal thickening.   Mastoids: Clear.   Orbits: Bilateral lens replacements   Soft tissues: Unremarkable.       No acute intracranial hemorrhage, mass effect, or CT apparent acute infarct. Chronic microvascular ischemia, sequela of prior left occipital lobe infarct and involutional changes.   Signed by: Segun Ford 7/13/2024 8:57 PM Dictation workstation:   [id number]    CT chest wo IV contrast    Result Date: 7/13/2024  Interpreted By:  Cristiano Brown, STUDY: CT CHEST WO IV CONTRAST;  7/13/2024 6:39 pm   INDICATION: Signs/Symptoms:Coarse breath sounds heard bilateral possible CHF versus pneumonia.   COMPARISON: None.   ACCESSION NUMBER(S): [id number]   ORDERING CLINICIAN: RYAN CALVILLO   TECHNIQUE: Axial CT images of the chest obtained without contrast.   FINDINGS: BONES: No acute osseous abnormality. Diffuse osseous demineralization. Chronic healed rib fracture deformities. LOWER NECK: Unremarkable. CHEST WALL: Left chest port with catheter tip in the mid SVC. UPPER ABDOMEN: Left adrenal gland nodule measuring 2.5 x 2.7 cm with internal Hounsfield unit of 24, indeterminate atrophic kidneys with vascular calcifications, likely chronic medical renal disease. Mildly distended gallbladder but no surrounding inflammation. Questionable nodular contour of the liver parenchyma raising suspicion for underlying liver disease.   VESSELS: Calcification of the aortic arch and its branches. No aneurysm. HEART: Normal size. Severe coronary atherosclerosis. There is calcification of the mitral annulus, aortic annulus, and aortic valve. Metallic density within the left ventricle, difficult to characterize and localize in the absence of contrast. MEDIASTINUM AND JENELLE: No pathologically enlarged lymph nodes. LUNGS AND LARGE AIRWAYS: There  is bronchiectasis, consolidation, and scarring in the right upper lobe with a linear border favoring prior right upper lobe radiation. Scattered tree-in-bud opacities throughout the bilateral lower lobes, left upper lobe, and lingula are present. PLEURA: No pleural effusion or pneumothorax.       Tree-in-bud opacities throughout the lungs is suspicious for bronchopneumonia.   Extensive coronary atherosclerosis is present but no evidence of cardiomegaly, pulmonary edema, or pleural effusions.   Right upper lobe bronchiectasis and scarred consolidation are noted which can be seen in the setting of prior right upper lobe radiation treatment. If patient has prior lung malignancy treated with radiation, then recommend comparing with prior oncologic exams to ensure stability. If patient does not have prior radiation treatment, then recommend pulmonology consultation and further characterization with PET-CT.   Left adrenal gland nodule measuring 2.7 cm is indeterminate. If outside imaging is available, recommend comparison. Otherwise, recommend characterization with adrenal protocol CT.   Subtle nodular contour of the liver parenchyma raises suspicion for underlying liver disease or early cirrhosis.     MACRO: Critical Finding:  See findings. Notification was initiated on 7/13/2024 at 7:19 pm by  Cristiano Brown.  (**-YCF-**) Instructions:   Signed by: Cristiano Brown 7/13/2024 7:20 PM Dictation workstation:   [id number]   No results found for this or any previous visit (from the past 24 hour(s)).    Assessment/Plan        Active Problems:  There are no active Hospital Problems.  Active Issues  #Hospice care  - DNR-CC   - Comfort care meds in place, midline and NG Tube removed. Kept port  - If patient rips out IV, can convert IV meds to sublingual     Dispo: 73 year old male admitted to [facility] hospice. Palliative recommends no discharge today.      Damian Wick DO

## 2024-07-25 NOTE — CARE PLAN
The patient's goals for the shift include  remaining comfortable    The clinical goals for the shift include patient will have pain controled this shift.

## 2024-07-25 NOTE — CONSULTS
PALLIATIVE MEDICINE CONSULT   Attending Physician: Magdi Carolina DO  Reason For Consult: Assistance with determining goals of care, complex medical decision making, code status discussion, and symptom management if indicated.    INTRODUCTIONS   Patient's capacity: None and is unable to participate in the visit  Family present:  None  Service: Palliative care was introduced as a resource for patients with serious illness to help with symptoms, assist with goals of care conversations, navigate complex decision making, improve quality of life for patients, and provide support to patients and families.     SUBJECTIVE   History of the Present Illness  Delmar Casas is a 73 y.o. male who has a past medical history of malignant neoplasm of unspecified  part of right bronchus, s/p CT and immunotherapy(1 month ago), sepsis, recurrent UTI, CKD stage IV, CVA,  oropharyngeal dysphagia, asthma (no home oxygen) , BPH, hematuria, hypokalemia, CAD, anemia, neutropenia, HLD, early dementia, mood disturbance, anxiety who presented to the ED for shortness of breath, gurgling in the throat and altered mental status. The pt has been deemed terminally ill.  The family elected comfort care. Palliative Medicine was consulted to assist with symptom management.      The history was obtained from knowing the pt since hospitalization and additional record review due to pt is incapacitated.    Review of Systems/Symptoms:   Other 10pt review of systems unobtainable unless otherwise mentioned in the HPI    Past Medical History  See HPI    Past Surgical History   has no past surgical history on file.    Family Medical History  No family history on file.    Home Medications  Reviewed    Allergies  No Known Allergies    Social History  The patient is . The pt has  2 kids.  Was living in [place] until a couple days before admission. The pt is a current smoker until being hospitalized, and there is no alcohol or drug use hx.      Protestant  "and Spirituality:  [facility]  Spiritual support was provided during this hospitalization       ADVANCE CARE PLANNING AND COUNSELING   Counseling and Support Provided  N/A    Serious Illness Info   Serious Illness: Respiratory Failure, Cerebrovascular Ischemic Disease , Multiorgan Failure, Advanced Dementia, and malignancy of the lung   Impression of disease and prognosis: Severe and will get worse regardless of medical care given the pt is not improving   Health preferences and priorities at EOL: Comfort care, no suffering  Rating of family knowledge about pt's disease, priorities and wishes: Good    Advanced Directive Documents/Proxy  No Documents  Sons x2 making decisions together with assistance from friends      Emergency Contact Information  Extended Emergency Contact Information  Primary Emergency Contact: Bird Casas (POA)  Mobile Phone: [phone]  Relation: Son   needed? No  Secondary Emergency Contact: Trell Casas  Mobile Phone: [phone]  Relation: Son    Resuscitation Assessment   Non beneficial     OBJECTIVE   Inpatient Medications  bisacodyl, 10 mg, rectal, Once  heparin flush, 50 Units, intra-catheter, q12h  hyoscyamine, 0.25 mg, intravenous, Once  LORazepam, 0.5 mg, intravenous, q4h  morphine, 2 mg, intravenous, q6h      Continuous medications     PRN medications  PRN medications: acetaminophen **OR** acetaminophen **OR** acetaminophen, alteplase, bisacodyl, haloperidol, heparin flush, hyoscyamine, LORazepam, morphine     Opioid Use  Has had 4x 2mg doses of morphine in the last 24 hrs which is not controlling his symptoms     Last Recorded Vitals  Blood pressure 112/51, pulse 93, temperature 36.6 °C (97.9 °F), temperature source Temporal, resp. rate 16, height 1.778 m (5' 10\"), weight 66.8 kg (147 lb 4.3 oz), SpO2 95%.  7 Day Weight Change: Unable to Calculate   Body mass index is 21.13 kg/m².   Weight change: -3 kg (-6 lb 9.8 oz)     Relevant Results  Lab Results "   Component Value Date    WBC 9.7 07/23/2024    HGB 9.1 (L) 07/23/2024    HCT 30.0 (L) 07/23/2024    MCV 95 07/23/2024     (L) 07/23/2024      Lab Results   Component Value Date    GLUCOSE 133 (H) 07/23/2024    CALCIUM 7.6 (L) 07/23/2024     07/23/2024    K 3.8 07/23/2024    CO2 27 07/23/2024     (H) 07/23/2024    BUN 18 07/23/2024    CREATININE 1.08 07/23/2024      Lab Results   Component Value Date    ALT 32 07/14/2024    AST 38 07/14/2024    ALKPHOS 101 07/14/2024    BILITOT 0.9 07/14/2024      Lab Results   Component Value Date    ALBUMIN 2.2 (L) 07/23/2024      Serum creatinine: 1.08 mg/dL 07/23/24 0541  Estimated creatinine clearance: 55.8 mL/min     Relevant Imaging  XR abdomen 1 view    Result Date: 7/20/2024  Interpreted By:  Ricco Boone, STUDY: XR ABDOMEN 1 VIEW;  7/20/2024 5:51 pm   INDICATION: Signs/Symptoms:Confirm Dobhoff placement.   COMPARISON: None.   ACCESSION NUMBER(S): [id number]   ORDERING CLINICIAN: HEIDI KELLER   FINDINGS: Dobbhoff tube tip projects over the gastric body at the mid abdomen. Nonobstructive bowel gas pattern. Limited evaluation of pneumoperitoneum on supine imaging, however no gross evidence of free air is noted.   Visualized lungs are clear.   Osseous structures demonstrate no acute bony changes.       1. Dobbhoff tube tip projects over the mid abdomen over the region of the gastric body   MACRO: None   Signed by: Ricco Boone 7/20/2024 7:03 PM Dictation workstation:   [id number]    ECG 12 Lead    Result Date: 7/16/2024  Sinus tachycardia with Premature supraventricular complexes ST & T wave abnormality, consider inferior ischemia Abnormal ECG No previous ECGs available See ED provider note for full interpretation and clinical correlation Confirmed by Rica Yousif ([id number]) on 7/16/2024 1:41:10 PM    XR chest 1 view    Result Date: 7/16/2024  Interpreted By:  Young, Malou, STUDY: XR CHEST 1 VIEW;  7/16/2024 11:41 am   INDICATION:  Signs/Symptoms:NG placement.   COMPARISON: None.   ACCESSION NUMBER(S): [id number]   ORDERING CLINICIAN: STEFAN DURANT   TECHNIQUE: 2 AP portable images of the chest were obtained.   FINDINGS: The heart is normal in size.   There is no obvious consolidation or pleural fluid.   A port is present on the left with the tip in the region of the superior vena cava.   There are atherosclerotic changes of the aorta.   A nasogastric tube terminates in the region of the stomach.   There are degenerative changes of the spine   COMPARISON OF FINDING:       Nasogastric tube with the tip in the region of the stomach.   No acute cardiopulmonary disease.   MACRO: none   Signed by: Malou Jaquez 7/16/2024 1:06 PM Dictation workstation:   [id number]    ECG 12 lead    Result Date: 7/15/2024  Accelerated Junctional rhythm Low voltage QRS Nonspecific T wave abnormality Abnormal ECG When compared with ECG of 13-JUL-2024 18:20, (unconfirmed) Junctional rhythm has replaced Sinus rhythm Vent. rate has decreased BY  44 BPM Non-specific change in ST segment in Inferior leads ST no longer depressed in Lateral leads Nonspecific T wave abnormality has replaced inverted T waves in Inferior leads    CT angio chest for pulmonary embolism    Result Date: 7/14/2024  Interpreted By:  Lux Palacio, STUDY: CT ANGIO CHEST FOR PULMONARY EMBOLISM;  7/14/2024 2:39 am   INDICATION: Signs/Symptoms:to exclude PE.   COMPARISON: 7/13/2024   ACCESSION NUMBER(S): [id number]   ORDERING CLINICIAN: POOJA DARBY   TECHNIQUE: Contiguous axial images of the chest were obtained after the intravenous administration of  contrast. Coronal and sagittal reformatted images were obtained from the axial images. MIPS of the chest were also performed and reviewed.   FINDINGS: The examination is limited secondary to motion and beam hardening artifact secondary to inferior position of the patient's arms.   No axillary lymphadenopathy. 1.6 cm subcarinal lymph node. The heart  is normal in size. Coronary artery atherosclerotic calcifications. No significant pericardial effusion. No evidence of acute central, main, lobar or proximal segmental pulmonary embolism.   Evaluation of the lungs is limited secondary to respiratory motion. There is redemonstration of masslike opacity in the medial right lung abutting the mediastinum which measures 4 cm x 1.5 cm in greatest axial dimensions. There nodular in size opacities posterior and superior to this opacity. There are mild consolidative opacity in the right lower lobe and in the left upper lobe and lingula compatible with pneumonia. Small nodular opacities also again noted.   Limited evaluation of the upper abdomen.   Multilevel degenerative change of the thoracic spine.       No evidence of acute pulmonary embolism.   Redemonstration of masslike opacity in the medial right upper lung abutting the mediastinum which measures 4 cm x 1.5 cm underlying malignancy is not excluded and comparison with outside prior imaging recommended and attention on short-term progress imaging.   Nodular opacities are again noted posterior and superior to the described masslike opacity. There is interval development of mild consolidative opacities in the right lower lobe and also in the left upper lobe and lingula compatible with pneumonia. There is also minimal opacity in the medial left lower lobe. Small nodular opacities are also again noted. Short-term progress imaging recommended for further evaluation.   MACRO: None   Signed by: Lux Palacio 7/14/2024 3:33 AM Dictation workstation:   [id number]    Lower extremity venous duplex bilateral    Result Date: 7/14/2024  Interpreted By:  Lux Palacio, STUDY: [facility] LOWER EXTREMITY VENOUS DUPLEX BILATERAL;  7/14/2024 1:32 am   INDICATION: Signs/Symptoms:High D-dimer, history of lung cancer with CT, COVID-positive, on Airvo.   COMPARISON: None.   ACCESSION NUMBER(S): [id number]   ORDERING CLINICIAN: POOJA DARBY    TECHNIQUE: Vascular ultrasound of the bilateral lower extremities was performed. Real-time compression views as well as Gray scale, color Doppler and spectral Doppler waveform analysis was performed.   FINDINGS: Evaluation of the visualized portions of the bilateral common femoral vein, proximal, mid, and distal femoral vein, and popliteal vein were performed.  Evaluation of the visualized portions of the  posterior tibial and peroneal veins were also performed.   The evaluated veins demonstrate normal compressibility. There is intact venous flow demonstrating normal respiratory variability and normal augmentation of flow with calf compression. Therefore, there is no ultrasonographic evidence for deep vein thrombosis within the evaluated veins.       No sonographic evidence for deep vein thrombosis within the evaluated veins of the bilateral lower extremity.   MACRO: None   Signed by: Lux Palacio 7/14/2024 1:33 AM Dictation workstation:   [id number]    CT head wo IV contrast    Result Date: 7/13/2024  Interpreted By:  Segun Ford, STUDY: CT HEAD WO IV CONTRAST;  7/13/2024 8:22 pm   INDICATION: Signs/Symptoms:AMS.   COMPARISON: None.   ACCESSION NUMBER(S): [id number]   ORDERING CLINICIAN: RYAN CALVILLO   TECHNIQUE: Noncontrast axial CT scan of head was performed.   FINDINGS: Parenchyma: There is no intracranial hemorrhage. The grey-white differentiation is intact. There is no mass effect or midline shift. Encephalomalacia left occipital lobe from prior infarct. Superimposed confluent supratentorial hypodensity, nonspecific, but likely secondary to chronic microvascular ischemia.   CSF Spaces: Mild generalized volume loss with concordant ventriculomegaly.   Extra-Axial Fluid: There is no extraaxial fluid collection.   Calvarium: The calvarium is unremarkable.   Paranasal sinuses: Moderate to severe diffuse paranasal sinus mucosal thickening.   Mastoids: Clear.   Orbits: Bilateral lens replacements   Soft  tissues: Unremarkable.       No acute intracranial hemorrhage, mass effect, or CT apparent acute infarct. Chronic microvascular ischemia, sequela of prior left occipital lobe infarct and involutional changes.   Signed by: Segun Ford 7/13/2024 8:57 PM Dictation workstation:   [id number]    CT chest wo IV contrast    Result Date: 7/13/2024  Interpreted By:  Cristiano Brown, STUDY: CT CHEST WO IV CONTRAST;  7/13/2024 6:39 pm   INDICATION: Signs/Symptoms:Coarse breath sounds heard bilateral possible CHF versus pneumonia.   COMPARISON: None.   ACCESSION NUMBER(S): [id number]   ORDERING CLINICIAN: RYAN CALVILLO   TECHNIQUE: Axial CT images of the chest obtained without contrast.   FINDINGS: BONES: No acute osseous abnormality. Diffuse osseous demineralization. Chronic healed rib fracture deformities. LOWER NECK: Unremarkable. CHEST WALL: Left chest port with catheter tip in the mid SVC. UPPER ABDOMEN: Left adrenal gland nodule measuring 2.5 x 2.7 cm with internal Hounsfield unit of 24, indeterminate atrophic kidneys with vascular calcifications, likely chronic medical renal disease. Mildly distended gallbladder but no surrounding inflammation. Questionable nodular contour of the liver parenchyma raising suspicion for underlying liver disease.   VESSELS: Calcification of the aortic arch and its branches. No aneurysm. HEART: Normal size. Severe coronary atherosclerosis. There is calcification of the mitral annulus, aortic annulus, and aortic valve. Metallic density within the left ventricle, difficult to characterize and localize in the absence of contrast. MEDIASTINUM AND JENELLE: No pathologically enlarged lymph nodes. LUNGS AND LARGE AIRWAYS: There is bronchiectasis, consolidation, and scarring in the right upper lobe with a linear border favoring prior right upper lobe radiation. Scattered tree-in-bud opacities throughout the bilateral lower lobes, left upper lobe, and lingula are present. PLEURA: No pleural  effusion or pneumothorax.       Tree-in-bud opacities throughout the lungs is suspicious for bronchopneumonia.   Extensive coronary atherosclerosis is present but no evidence of cardiomegaly, pulmonary edema, or pleural effusions.   Right upper lobe bronchiectasis and scarred consolidation are noted which can be seen in the setting of prior right upper lobe radiation treatment. If patient has prior lung malignancy treated with radiation, then recommend comparing with prior oncologic exams to ensure stability. If patient does not have prior radiation treatment, then recommend pulmonology consultation and further characterization with PET-CT.   Left adrenal gland nodule measuring 2.7 cm is indeterminate. If outside imaging is available, recommend comparison. Otherwise, recommend characterization with adrenal protocol CT.   Subtle nodular contour of the liver parenchyma raises suspicion for underlying liver disease or early cirrhosis.     MACRO: Critical Finding:  See findings. Notification was initiated on 7/13/2024 at 7:19 pm by  Cristiano Brown.  (**-YCF-**) Instructions:   Signed by: Cristiano Brown 7/13/2024 7:20 PM Dictation workstation:   [id number]       Physical Exam   GENERAL: lethargic, ill appearing, frail elderly, laying in bed, condition appears poor, restless  SKIN: dry    HEENT: + excessive OP secretions   LUNGS: tachypneic at 24/b/m and some increased WOB  CARDIO: no JVD   GI: non distended  : urine is darinel draining via external catheter in small amounts   NEURO: lethargic  PSYCH: restless    ASSESSMENT AND PLAN   Impression  This is a terminally ill and radha EOL 73 y.o. year old who is hospitalized for COVID Respiratory failure with hypoxia, unspecified chronicity (Multi). This has been complicated by  critical condition, dysphagia,  poor mental status, LUPILLO  . Patients overall condition  has worsened and he is symptomatic.     GOC: Comfort ; Family hoping to take the pt home if possible   Code  Status: DNRCC  Prognosis: Poor   Life Expectancy: Hours to days (less than 1 week)    Plan:  Aggressively manage active symptoms   Would not recommend DC today given pt is progressing and symptoms uncontrolled  General Comfort orders placed.   Specific mgmt:     Dyspnea   Uncontrolled  Increased frequency of morphine 2mg to Q 6 hrs and Q 15 min PRN  Position for comfort of breathing  If stridor develops, can consider nebulized saline  02 for comfort only / transition to comfort 02 2-6L when ready  Secretion mgmt     Restlessness/Anxiety   Increased frequency of ativan to Q4hrs and Q 4 hrs PRN   Address and monitor bowel and bladder fxn  Music therapy as tolerated    Excessive OP secretions  Added Levsin Q 4 hrs PRN---> Add scopolamine patch if worsening  Position for comfort  Frequent oral care   Minimize suctioning    Risk for Urinary Retention  Bladder scan Q 8 hrs   Insert giron if needed    Constipation  Last BM 7/22  Dulcolax supp x 1   Monitor output record     Thank you for asking Palliative Care to assist with care of this patient.  Please contact us for additional questions or concerns.    Update provided to: The Primary team and the bedside nurse    MEDICAL COMPLEXITY    Medical complexity was high level due to due to 1 or more illness with severe exacerbation /progression/ poses threat to life or bodily function, DNR, and hospice care.       CONTACT INFORMATION   Francie Bledsoe CNP  Palliative Medicine  [facility] Secure chat

## 2024-07-26 NOTE — CARE PLAN
The patient's goals for the shift include      The clinical goals for the shift include patient will have adequate pain control/respiratory support this shift.      Problem: Fall/Injury  Goal: Not fall by end of shift  Outcome: Progressing  Goal: Be free from injury by end of the shift  Outcome: Progressing  Goal: Verbalize understanding of personal risk factors for fall in the hospital  Outcome: Progressing  Goal: Verbalize understanding of risk factor reduction measures to prevent injury from fall in the home  Outcome: Progressing  Goal: Use assistive devices by end of the shift  Outcome: Progressing  Goal: Pace activities to prevent fatigue by end of the shift  Outcome: Progressing     Problem: Pain - Adult  Goal: Verbalizes/displays adequate comfort level or baseline comfort level  Outcome: Progressing     Problem: Safety - Adult  Goal: Free from fall injury  Outcome: Progressing     Problem: Discharge Planning  Goal: Discharge to home or other facility with appropriate resources  Outcome: Progressing     Problem: Skin  Goal: Prevent/manage excess moisture  Outcome: Progressing  Goal: Prevent/minimize sheer/friction injuries  Outcome: Progressing  Goal: Promote/optimize nutrition  Outcome: Progressing

## 2024-07-26 NOTE — SIGNIFICANT EVENT
At around 1121, notified by patient's nurse that patient had passed away.    Physical exam shows no audible heart sounds, no audible breath sounds, no carotid pulses, and fixed pupils with no corneal reflex. No gag reflex was noted.   Pt declared dead at 1122.   Family was present and will make arrangements.   Emotional support provided to family members.   [facility] notified.

## 2024-07-26 NOTE — PROGRESS NOTES
Delmar Casas is a 73 y.o. male on day 3 of admission presenting for [facility] hospice.    Subjective    Patient was somnolent this morning. His son was bedside and reported that the pt just received a dose of morphine before I came in.    Objective     Last Recorded Vitals  /67 (BP Location: Right arm, Patient Position: Lying)   Pulse 107   Temp 36.7 °C (98.1 °F) (Temporal)   Resp 19   Wt 66.8 kg (147 lb 4.3 oz)   SpO2 98%   Intake/Output last 3 Shifts:    Intake/Output Summary (Last 24 hours) at 7/26/2024 1054  Last data filed at 7/25/2024 1223  Gross per 24 hour   Intake 80 ml   Output --   Net 80 ml       Admission Weight  Weight: 69.8 kg (153 lb 14.1 oz) (07/24/24 0438)    Daily Weight  07/25/24 : 66.8 kg (147 lb 4.3 oz)    Image Results  XR abdomen 1 view  Narrative: Interpreted By:  Ricco Boone,   STUDY:  XR ABDOMEN 1 VIEW;  7/20/2024 5:51 pm      INDICATION:  Signs/Symptoms:Confirm Dobhoff placement.      COMPARISON:  None.      ACCESSION NUMBER(S):  [id number]      ORDERING CLINICIAN:  HEIDI KELLER      FINDINGS:  Dobbhoff tube tip projects over the gastric body at the mid abdomen.  Nonobstructive bowel gas pattern. Limited evaluation of  pneumoperitoneum on supine imaging, however no gross evidence of free  air is noted.      Visualized lungs are clear.      Osseous structures demonstrate no acute bony changes.      Impression: 1. Dobbhoff tube tip projects over the mid abdomen over the region of  the gastric body      MACRO:  None      Signed by: Ricco Boone 7/20/2024 7:03 PM  Dictation workstation:   [id number]      Physical Exam  Constitutional:       Appearance: He is ill-appearing.      Comments: Somnolent   Cardiovascular:      Rate and Rhythm: Normal rate and regular rhythm.      Pulses: Normal pulses.   Pulmonary:      Effort: Respiratory distress present.      Breath sounds: Wheezing and rales present.         Relevant Results             Scheduled medications  glycopyrrolate,  0.2 mg, intravenous, 4x daily  heparin flush, 50 Units, intra-catheter, q12h  LORazepam, 1 mg, intravenous, q4h  morphine, 4 mg, intravenous, q4h  oxygen, , inhalation, Continuous - Inhalation  scopolamine, 1 patch, transdermal, q72h      Continuous medications     PRN medications  PRN medications: acetaminophen **OR** acetaminophen **OR** acetaminophen, alteplase, bisacodyl, haloperidol, heparin flush, LORazepam, morphine  No results found for this or any previous visit (from the past 24 hour(s)).  XR abdomen 1 view    Result Date: 7/20/2024  Interpreted By:  Ricco Boone, STUDY: XR ABDOMEN 1 VIEW;  7/20/2024 5:51 pm   INDICATION: Signs/Symptoms:Confirm Dobhoff placement.   COMPARISON: None.   ACCESSION NUMBER(S): [id number]   ORDERING CLINICIAN: HEIDI KELLER   FINDINGS: Dobbhoff tube tip projects over the gastric body at the mid abdomen. Nonobstructive bowel gas pattern. Limited evaluation of pneumoperitoneum on supine imaging, however no gross evidence of free air is noted.   Visualized lungs are clear.   Osseous structures demonstrate no acute bony changes.       1. Dobbhoff tube tip projects over the mid abdomen over the region of the gastric body   MACRO: None   Signed by: Ricco Boone 7/20/2024 7:03 PM Dictation workstation:   [id number]    ECG 12 Lead    Result Date: 7/16/2024  Sinus tachycardia with Premature supraventricular complexes ST & T wave abnormality, consider inferior ischemia Abnormal ECG No previous ECGs available See ED provider note for full interpretation and clinical correlation Confirmed by Rica Yousif ([id number]) on 7/16/2024 1:41:10 PM    XR chest 1 view    Result Date: 7/16/2024  Interpreted By:  Malou Jaquez, STUDY: XR CHEST 1 VIEW;  7/16/2024 11:41 am   INDICATION: Signs/Symptoms:NG placement.   COMPARISON: None.   ACCESSION NUMBER(S): [id number]   ORDERING CLINICIAN: STEFAN DURANT   TECHNIQUE: 2 AP portable images of the chest were obtained.   FINDINGS: The heart is  normal in size.   There is no obvious consolidation or pleural fluid.   A port is present on the left with the tip in the region of the superior vena cava.   There are atherosclerotic changes of the aorta.   A nasogastric tube terminates in the region of the stomach.   There are degenerative changes of the spine   COMPARISON OF FINDING:       Nasogastric tube with the tip in the region of the stomach.   No acute cardiopulmonary disease.   MACRO: none   Signed by: Malou Jaquez 7/16/2024 1:06 PM Dictation workstation:   [id number]    ECG 12 lead    Result Date: 7/15/2024  Accelerated Junctional rhythm Low voltage QRS Nonspecific T wave abnormality Abnormal ECG When compared with ECG of 13-JUL-2024 18:20, (unconfirmed) Junctional rhythm has replaced Sinus rhythm Vent. rate has decreased BY  44 BPM Non-specific change in ST segment in Inferior leads ST no longer depressed in Lateral leads Nonspecific T wave abnormality has replaced inverted T waves in Inferior leads    CT angio chest for pulmonary embolism    Result Date: 7/14/2024  Interpreted By:  Lux Palacio, STUDY: CT ANGIO CHEST FOR PULMONARY EMBOLISM;  7/14/2024 2:39 am   INDICATION: Signs/Symptoms:to exclude PE.   COMPARISON: 7/13/2024   ACCESSION NUMBER(S): [id number]   ORDERING CLINICIAN: POOJA DARBY   TECHNIQUE: Contiguous axial images of the chest were obtained after the intravenous administration of  contrast. Coronal and sagittal reformatted images were obtained from the axial images. MIPS of the chest were also performed and reviewed.   FINDINGS: The examination is limited secondary to motion and beam hardening artifact secondary to inferior position of the patient's arms.   No axillary lymphadenopathy. 1.6 cm subcarinal lymph node. The heart is normal in size. Coronary artery atherosclerotic calcifications. No significant pericardial effusion. No evidence of acute central, main, lobar or proximal segmental pulmonary embolism.   Evaluation of the  lungs is limited secondary to respiratory motion. There is redemonstration of masslike opacity in the medial right lung abutting the mediastinum which measures 4 cm x 1.5 cm in greatest axial dimensions. There nodular in size opacities posterior and superior to this opacity. There are mild consolidative opacity in the right lower lobe and in the left upper lobe and lingula compatible with pneumonia. Small nodular opacities also again noted.   Limited evaluation of the upper abdomen.   Multilevel degenerative change of the thoracic spine.       No evidence of acute pulmonary embolism.   Redemonstration of masslike opacity in the medial right upper lung abutting the mediastinum which measures 4 cm x 1.5 cm underlying malignancy is not excluded and comparison with outside prior imaging recommended and attention on short-term progress imaging.   Nodular opacities are again noted posterior and superior to the described masslike opacity. There is interval development of mild consolidative opacities in the right lower lobe and also in the left upper lobe and lingula compatible with pneumonia. There is also minimal opacity in the medial left lower lobe. Small nodular opacities are also again noted. Short-term progress imaging recommended for further evaluation.   MACRO: None   Signed by: Lux Palacio 7/14/2024 3:33 AM Dictation workstation:   [id number]    Lower extremity venous duplex bilateral    Result Date: 7/14/2024  Interpreted By:  Lux Palacio, STUDY: [facility] US LOWER EXTREMITY VENOUS DUPLEX BILATERAL;  7/14/2024 1:32 am   INDICATION: Signs/Symptoms:High D-dimer, history of lung cancer with CT, COVID-positive, on Airvo.   COMPARISON: None.   ACCESSION NUMBER(S): [id number]   ORDERING CLINICIAN: POOJA DARBY   TECHNIQUE: Vascular ultrasound of the bilateral lower extremities was performed. Real-time compression views as well as Gray scale, color Doppler and spectral Doppler waveform analysis was performed.    FINDINGS: Evaluation of the visualized portions of the bilateral common femoral vein, proximal, mid, and distal femoral vein, and popliteal vein were performed.  Evaluation of the visualized portions of the  posterior tibial and peroneal veins were also performed.   The evaluated veins demonstrate normal compressibility. There is intact venous flow demonstrating normal respiratory variability and normal augmentation of flow with calf compression. Therefore, there is no ultrasonographic evidence for deep vein thrombosis within the evaluated veins.       No sonographic evidence for deep vein thrombosis within the evaluated veins of the bilateral lower extremity.   MACRO: None   Signed by: Lux Palacio 7/14/2024 1:33 AM Dictation workstation:   [id number]    CT head wo IV contrast    Result Date: 7/13/2024  Interpreted By:  Segun Ford, STUDY: CT HEAD WO IV CONTRAST;  7/13/2024 8:22 pm   INDICATION: Signs/Symptoms:AMS.   COMPARISON: None.   ACCESSION NUMBER(S): [id number]   ORDERING CLINICIAN: RYAN CALVILLO   TECHNIQUE: Noncontrast axial CT scan of head was performed.   FINDINGS: Parenchyma: There is no intracranial hemorrhage. The grey-white differentiation is intact. There is no mass effect or midline shift. Encephalomalacia left occipital lobe from prior infarct. Superimposed confluent supratentorial hypodensity, nonspecific, but likely secondary to chronic microvascular ischemia.   CSF Spaces: Mild generalized volume loss with concordant ventriculomegaly.   Extra-Axial Fluid: There is no extraaxial fluid collection.   Calvarium: The calvarium is unremarkable.   Paranasal sinuses: Moderate to severe diffuse paranasal sinus mucosal thickening.   Mastoids: Clear.   Orbits: Bilateral lens replacements   Soft tissues: Unremarkable.       No acute intracranial hemorrhage, mass effect, or CT apparent acute infarct. Chronic microvascular ischemia, sequela of prior left occipital lobe infarct and involutional  changes.   Signed by: Segun Ford 7/13/2024 8:57 PM Dictation workstation:   [id number]    CT chest wo IV contrast    Result Date: 7/13/2024  Interpreted By:  Cristiano Brown, STUDY: CT CHEST WO IV CONTRAST;  7/13/2024 6:39 pm   INDICATION: Signs/Symptoms:Coarse breath sounds heard bilateral possible CHF versus pneumonia.   COMPARISON: None.   ACCESSION NUMBER(S): [id number]   ORDERING CLINICIAN: RYAN CALVILLO   TECHNIQUE: Axial CT images of the chest obtained without contrast.   FINDINGS: BONES: No acute osseous abnormality. Diffuse osseous demineralization. Chronic healed rib fracture deformities. LOWER NECK: Unremarkable. CHEST WALL: Left chest port with catheter tip in the mid SVC. UPPER ABDOMEN: Left adrenal gland nodule measuring 2.5 x 2.7 cm with internal Hounsfield unit of 24, indeterminate atrophic kidneys with vascular calcifications, likely chronic medical renal disease. Mildly distended gallbladder but no surrounding inflammation. Questionable nodular contour of the liver parenchyma raising suspicion for underlying liver disease.   VESSELS: Calcification of the aortic arch and its branches. No aneurysm. HEART: Normal size. Severe coronary atherosclerosis. There is calcification of the mitral annulus, aortic annulus, and aortic valve. Metallic density within the left ventricle, difficult to characterize and localize in the absence of contrast. MEDIASTINUM AND JENELLE: No pathologically enlarged lymph nodes. LUNGS AND LARGE AIRWAYS: There is bronchiectasis, consolidation, and scarring in the right upper lobe with a linear border favoring prior right upper lobe radiation. Scattered tree-in-bud opacities throughout the bilateral lower lobes, left upper lobe, and lingula are present. PLEURA: No pleural effusion or pneumothorax.       Tree-in-bud opacities throughout the lungs is suspicious for bronchopneumonia.   Extensive coronary atherosclerosis is present but no evidence of cardiomegaly, pulmonary  edema, or pleural effusions.   Right upper lobe bronchiectasis and scarred consolidation are noted which can be seen in the setting of prior right upper lobe radiation treatment. If patient has prior lung malignancy treated with radiation, then recommend comparing with prior oncologic exams to ensure stability. If patient does not have prior radiation treatment, then recommend pulmonology consultation and further characterization with PET-CT.   Left adrenal gland nodule measuring 2.7 cm is indeterminate. If outside imaging is available, recommend comparison. Otherwise, recommend characterization with adrenal protocol CT.   Subtle nodular contour of the liver parenchyma raises suspicion for underlying liver disease or early cirrhosis.     MACRO: Critical Finding:  See findings. Notification was initiated on 7/13/2024 at 7:19 pm by  Cristiano Brown.  (**-YCF-**) Instructions:   Signed by: Cristiano Brown 7/13/2024 7:20 PM Dictation workstation:   [id number]     Assessment/Plan      Active Problems:  There are no active Hospital Problems.  Active Issues  #Hospice care  - DNR-CC   - Comfort care meds in place, midline and NG Tube removed. Kept port  - If patient rips out IV, can convert IV meds to sublingual     Dispo: 73 year old male admitted to [facility] hospice. Patient will no longer be discharged due to terminal secretions.            Damian Wick DO

## 2024-07-26 NOTE — CARE PLAN
The patient's goals for the shift include      The clinical goals for the shift include patient will have adequate pain control/respiratory support this shift      Problem: Chronic Conditions and Co-morbidities  Goal: Patient's chronic conditions and co-morbidity symptoms are monitored and maintained or improved  Outcome: Not Progressing     Problem: Nutrition  Goal: Less than 5 days NPO/clear liquids  Outcome: Not Progressing  Goal: Oral intake greater than 50%  Outcome: Not Progressing  Goal: Oral intake greater 75%  Outcome: Not Progressing  Goal: Consume prescribed supplement  Outcome: Not Progressing  Goal: Adequate PO fluid intake  Outcome: Not Progressing  Goal: Nutrition support goals are met within 48 hrs  Outcome: Not Progressing  Goal: Nutrition support is meeting 75% of nutrient needs  Outcome: Not Progressing  Goal: Tube feed tolerance  Outcome: Not Progressing  Goal: BG  mg/dL  Outcome: Not Progressing  Goal: Lab values WNL  Outcome: Not Progressing  Goal: Electrolytes WNL  Outcome: Not Progressing  Goal: Promote healing  Outcome: Not Progressing  Goal: Maintain stable weight  Outcome: Not Progressing  Goal: Reduce weight from edema/fluid  Outcome: Not Progressing  Goal: Gradual weight gain  Outcome: Not Progressing  Goal: Improve ostomy output  Outcome: Not Progressing

## 2024-07-26 NOTE — PROGRESS NOTES
07/26/24 1017   Discharge Planning   Living Arrangements Other (Comment)   Support Systems Children;Family members   Type of Residence Private residence   Home or Post Acute Services Community services   Expected Discharge Disposition Hospice Res  (Patient is [facility] hospice with [facility]- discharge planning as needed)   Does the patient need discharge transport arranged? Yes   RoundTrip coordination needed? Yes   Has discharge transport been arranged? No

## 2024-07-26 NOTE — CARE PLAN
Problem: Fall/Injury  Goal: Not fall by end of shift  Outcome: Progressing  Goal: Be free from injury by end of the shift  Outcome: Progressing  Goal: Verbalize understanding of personal risk factors for fall in the hospital  Outcome: Progressing  Goal: Verbalize understanding of risk factor reduction measures to prevent injury from fall in the home  Outcome: Progressing  Goal: Use assistive devices by end of the shift  Outcome: Progressing  Goal: Pace activities to prevent fatigue by end of the shift  Outcome: Progressing     Problem: Pain - Adult  Goal: Verbalizes/displays adequate comfort level or baseline comfort level  Outcome: Progressing     Problem: Safety - Adult  Goal: Free from fall injury  Outcome: Progressing     Problem: Discharge Planning  Goal: Discharge to home or other facility with appropriate resources  Outcome: Progressing   The patient's goals for the shift include      The clinical goals for the shift include patient will have adequate pain control/respiratory support this shift    Problem: Chronic Conditions and Co-morbidities  Goal: Patient's chronic conditions and co-morbidity symptoms are monitored and maintained or improved  Outcome: Not Progressing     Problem: Nutrition  Goal: Less than 5 days NPO/clear liquids  Outcome: Not Progressing  Goal: Oral intake greater than 50%  Outcome: Not Progressing  Goal: Oral intake greater 75%  Outcome: Not Progressing  Goal: Consume prescribed supplement  Outcome: Not Progressing  Goal: Adequate PO fluid intake  Outcome: Not Progressing  Goal: Nutrition support goals are met within 48 hrs  Outcome: Not Progressing  Goal: Nutrition support is meeting 75% of nutrient needs  Outcome: Not Progressing  Goal: Tube feed tolerance  Outcome: Not Progressing  Goal: BG  mg/dL  Outcome: Not Progressing  Goal: Lab values WNL  Outcome: Not Progressing  Goal: Electrolytes WNL  Outcome: Not Progressing  Goal: Promote healing  Outcome: Not Progressing  Goal:  Maintain stable weight  Outcome: Not Progressing  Goal: Reduce weight from edema/fluid  Outcome: Not Progressing  Goal: Gradual weight gain  Outcome: Not Progressing  Goal: Improve ostomy output  Outcome: Not Progressing

## 2024-07-26 NOTE — NURSING NOTE
RN Hospice Note    Delmar Casas is a Hospice Patient.   Hospice terminal diagnosis: Lung CA  Physician: Caity CARRIZALES, Rakan HUITRON  Visit type: SX Control    Comments/recommendations: Pt obtunded, /72. RR 30s.  POX 40% on RA, LS Course Rhonchi throughout. Pt appears imminent. Discussed changes with pt carlos Pang, who is at the bedside, he is agreeable pt should stay here, due to instability for transport. TC to carlos Cai to review, he is also in agreement pt should not be transported.   Updated team, Per Francie Bledsoe CNP, will increase Morphine to 4 mg IVP q 4 hrs, no other changes at this time.   Please contact [facility] W[facility] if pt passes during day, after hours please contact [facility] Triage    Family is still choosing  Home.    Discharge Planning:  Patient to be discharged to  Remain at [facility] in [facility]    The following is to be completed:  Discharge order: Not at this time  State DNR signed by MD: completed  Nursing facility referral/transfer form: NA  Medication reconciliation: completed  PAS/RR or convalescent stay form: NA  Prescriptions for al narcotics/new medications: NA  Transportation: NA  Other: NA    Plan of care reviewed with patient/family members Discussed with felecia Pang and Trell   Plan of care reviewed with hospital staff members: Trisha Casas, Belgica Barajas, Josy Loredo     Please notify [facility] of any changes in condition. Thank you.  Office: [phone] (8 am-6:30 pm M-F and 8 am-4:30 pm weekends and holidays)   [phone] (6:30 pm-8 am M-F and 4:30 pm-8 am weekends and holidays)    Oanh Wray RN

## 2024-07-26 NOTE — DISCHARGE SUMMARY
Discharge Diagnosis  Cardiopulmonary Arrest  Encephalopathy  Dysphagia  Acute Kidney Injury  Acute hypoxic respiratory failure    Issues Requiring Follow-Up  Support provided to family at time of passing    Hospital Course  Delmar Casas is a 73 y.o. male with past medical history of malignant neoplasm of unspecified  part of right bronchus, s/p CT and immunotherapy(1 month ago), sepsis, recurrent UTI, CKD stage IV, oropharyngeal dysphagia, asthma (no home oxygen) , BPH, hematuria, hypokalemia, CAD, anemia, neutropenia, HLD, early dementia, mood disturbance, anxiety admitted with AHRF 2/2 PNA, COVID positive, severe hypernatremia and LUPILLO on CKD.     He was brought from [place] by family recently to [place] where they found him hypernatremic and with AMS. Before that he was being treated for “sepsis” at a [place] hospital and got IV abx in the last 2 months. He had evidence of PNA on imaging and was treated with azithro, vanc and Zosyn. Also MRSA and COVID positive, started on dexamethasone and remdesivir. Nephrology was consulted and recommended continuing D5W for the hypernatremia which improved throughout the course of admission. SLP was ordered but unable to be performed due to patient's mental status and an NGT was placed and tube feeds were started. Palliative care was consulted and a meeting was scheduled with [facility] for 7/23. His giron was removed on 7/18. General surgery was consulted for PEG tube placement. Patient was medically stable for transfer to regular nursing floor.      Pt ripped off NGT and is a poor candidate for PEG tube due to AMS. Hospice meeting with pt and family Tuesday.     Family underwent hospice meeting on 7/23, family elected to go comfort care. Comfort care measures in place, DNR-CC paperwork signed. Patient will be discharged and readmitted to [facility] hospice.    Patient remained in hospital and on 7/26/24 at 1122 patient passed away.  Family present. Emotional support provided to  them.    Time spent with hospital discharge planning approximately 35 minutes.     Pertinent Physical Exam At Time of Discharge  Physical Exam  Physical exam shows no audible heart sounds, no audible breath sounds, no carotid pulses, and fixed pupils with no corneal reflex. No gag reflex was noted.     Outpatient Follow-Up  No future appointments.      Magdi Carolina DO

## 2025-02-03 NOTE — PROGRESS NOTES
"Subjective   Subjective:   Overnight Events  No acute events overnight     Subjective  Patient seen and examined, A&O x1-2 to name and mumbles few words.     Review Of Systems:  12-point ROS was performed and is negative except as noted below and in the HPI.      Objective   Objective:     /59   Pulse 71   Temp 36.3 °C (97.3 °F)   Resp 18   Ht 1.778 m (5' 10\")   Wt 67.8 kg (149 lb 7.6 oz)   SpO2 98%   BMI 21.45 kg/m²     Physical Exam  Constitutional:       General: He is not in acute distress.     Appearance: Normal appearance. He is ill-appearing.   Neck:      Vascular: No carotid bruit.   Cardiovascular:      Rate and Rhythm: Normal rate and regular rhythm.      Pulses: Normal pulses.      Heart sounds: Normal heart sounds. No murmur heard.     No friction rub. No gallop.   Pulmonary:      Effort: Pulmonary effort is normal. No respiratory distress.      Breath sounds: Normal breath sounds. No wheezing, rhonchi or rales.   Abdominal:      General: Abdomen is flat. Bowel sounds are normal. There is no distension.      Palpations: Abdomen is soft. There is no mass.      Tenderness: There is no abdominal tenderness. There is no guarding or rebound.   Musculoskeletal:         General: No swelling or tenderness. Normal range of motion.      Cervical back: Normal range of motion and neck supple. No rigidity or tenderness.   Skin:     General: Skin is warm and dry.      Coloration: Skin is not jaundiced.      Findings: No erythema or rash.   Neurological:      Motor: No weakness.      Comments: A&O x1-2, mumbling words   Psychiatric:         Mood and Affect: Mood normal.         Behavior: Behavior normal.       Lab Work:     Lab Results   Component Value Date    WBC 7.7 07/18/2024    HGB 10.0 (L) 07/18/2024    HCT 33.8 (L) 07/18/2024    MCV 97 07/18/2024     07/18/2024     Lab Results   Component Value Date    GLUCOSE 176 (H) 07/18/2024    CALCIUM 7.5 (L) 07/18/2024     07/18/2024    K 3.3 (L) " "07/18/2024    CO2 23 07/18/2024     (H) 07/18/2024    BUN 35 (H) 07/18/2024    CREATININE 1.43 (H) 07/18/2024     No results found for: \"HGBA1C\", \"TSH\", \"VITD25\"  Cultures:   Susceptibility data from last 90 days.  Collected Specimen Info Organism Nitrofurantoin Oxacillin Trimethoprim/Sulfamethoxazole Vancomycin   07/13/24 Swab from Anterior Nares Methicillin Resistant Staphylococcus aureus (MRSA)       07/13/24 Urine from Indwelling (Pickett) Catheter Methicillin Resistant Staphylococcus aureus (MRSA)  S  R  S  S     Images:     CT chest wo IV contrast   Final Result   Tree-in-bud opacities throughout the lungs is suspicious for   bronchopneumonia.        Extensive coronary atherosclerosis is present but no evidence of   cardiomegaly, pulmonary edema, or pleural effusions.        Right upper lobe bronchiectasis and scarred consolidation are noted   which can be seen in the setting of prior right upper lobe radiation   treatment. If patient has prior lung malignancy treated with   radiation, then recommend comparing with prior oncologic exams to   ensure stability. If patient does not have prior radiation treatment,   then recommend pulmonology consultation and further characterization   with PET-CT.        Left adrenal gland nodule measuring 2.7 cm is indeterminate. If   outside imaging is available, recommend comparison. Otherwise,   recommend characterization with adrenal protocol CT.        Subtle nodular contour of the liver parenchyma raises suspicion for   underlying liver disease or early cirrhosis.             MACRO:   Critical Finding:  See findings. Notification was initiated on   7/13/2024 at 7:19 pm by  Cristiano Brown.  (**-YCF-**) Instructions:        Signed by: Cristiano Brown 7/13/2024 7:20 PM   Dictation workstation:   [id number]         Medications:     Scheduled:  aspirin, 81 mg, oral, Daily  dexAMETHasone, 4 mg, intravenous, q24h  heparin (porcine), 5,000 Units, subcutaneous, q8h  insulin " lispro, 0-5 Units, subcutaneous, q6h  mupirocin, , Topical, BID  nystatin, 4 mL, Swish & Swallow, TID  oxygen, , inhalation, Continuous - Inhalation  pantoprazole, 40 mg, intravenous, Daily  piperacillin-tazobactam, 2.25 g, intravenous, q6h  rosuvastatin, 10 mg, oral, Daily  [Held by provider] senna, 10 mL, oral, BID  [Held by provider] tamsulosin, 0.4 mg, oral, Nightly  tiotropium, 2 puff, inhalation, Daily  vancomycin, 750 mg, intravenous, q24h    Continuous:       PRN:  PRN medications: acetaminophen **OR** acetaminophen **OR** acetaminophen, albuterol, bisacodyl, dextrose, glucagon, moisturizing mouth, vancomycin     Assessment & Plan:   Delmar Casas is a 73 y.o. male with past medical history of malignant neoplasm of unspecified part of right bronchus, s/p CT and immunotherapy(1 month ago), sepsis, recurrent UTI, CKD stage IV, oropharyngeal dysphagia, asthma (no home oxygen) , BPH, hematuria, hypokalemia, CAD, anemia, neutropenia, HLD, early dementia, mood disturbance, anxiety admitted to the ICU with AHRF 2/2 PNA, COVID positive, severe hypernatremia and LUPILLO on CKD.     NEURO/PSYCH:  # Acute metabolic encephalopathy 2/2 PNA  # Early dementia  # Anxiety, mood disturbance  - Hold home hydroxyzine 5 mg and Ativan 0.5 mg PRN  - Monitor for ICU delirium   - Fall precautions in place, soft restraints for agitation  - Treating hypernatremia and pna as below     CARDIOVASCULAR:  # CAD  # HLD  # Prolonged Qtc (resolved)  - Continue aspirin 81mg   - Continue rosuvastatin 10mg    PULMONARY:  # AHRF 2/2 HAP   # COVID positive  # Hx of lung cancer  # Hx of asthma/COPD   - Pt presented with AHRF requiring 15 L HFNC, later placed on Airvo, currently on 3L  - Hx 120 pack year tobacco use   - CT chest bronchopneumonia, Right upper lobe bronchiectasis and scarred consolidation are noted which can be seen in the setting of prior right upper lobe radiation treatment  - MRSA nares (+), high D-dimer 1,808, fibrinogen 510  -  Vascular US lower extremity negative  - CT angio chest PE negative for PE   - S/p Azithro, continue Vanc + Zosyn    - S/p Remdesivir, taper Dex give 4mg today, 2mg on 7/19, 1mg on 7/20  - On Spiriva Respimat daily and albuterol q6h PRN  - ICS, Acapella, chest PT   - Aspiration precautions  - NPO, will try SLP again tomorrow per family request   - Family wants to hold off on PEG tube at this time   - Meeting with [facility] on Tuesday 7/23, will likely DC back to SNF before then     RENAL/UROLOGY:  # Severe hypernatremia (Resolved)   # Hyperchloremia  - Pt presented with AMS, Na 165 on admission   - 2/2 dehydration and decreased po intake (not eating much d/t sore throat)   - Nephrology signed off   - Continue FWF to ensure 1.5L free water via NGT daily      # LUPILLO on CKD Stage IV  # Hx of recurrent UTI   # Chronic Pickett  - Cr 3.63 on admission, baseline not clear as no previous records available  - Previously being treated for recurrent UTI  - UA significant for high LE and pyuria  - Urine culture (7/13/24): MRSA   - Nephrology following, appreciate recs   - Has had Pickett for 2 months, was placed during last hospital stay in [place]. Exchanged 7/14, will attempt to remove and do voiding trial today 7/18      # Left adrenal gland nodule  - CT chest showed left adrenal nodule measuring 2.7 centimeters  - Radiology recommended comparison-outside imaging available, otherwise characterization with adrenal protocol CT     # BPH  - Holding Tamsulosin 0.4mg (cannot get via NGT)    GASTROINTESTINAL:  # Oropharyngeal dysphagia  # Decreased p.o. intake   # Sore throat s/p CT and immunotherapy  # Liver nodule (possible cirrhosis/liver disease)  - NPO, will order SLP again per family request   - Aspiration precautions  - On bowel regimen for constipation  - Monitor liver nodule  - NG tube placed 7/16, continue TF and FWF   - Continue Protonix 40mg IV daily      HEME/ONC:  # Malignant neoplasm of unspecified part of right bronchus  # S/P  CT and Immunotherapy  # History of basal cell carcinoma on the nose  # Hx Anemia  - Had recent chemotherapy and immunotherapy about a month ago  - Developed sepsis followed by hospitalization and SNF after chemo  - High D-dimer 1,808, PT/INR normal  - LE DVT US negative   - CT angio chest negative for PE   - Holding PO iron     ENDOCRINE:  # Mild hyperglycemia   - 2/2 5% dextrose  - No history of DM  - On NPO SSI    INFECTIOUS:  # Sepsis 2/2 PNA vs UTI   - SIRS score 3, lactate normal 1.9  - 1L LR bolus, vancomycin and Zosyn in the ED  - Urine culture (7/13/24): MRSA   - Blood cultures (7/13/24): NGTD  - Continue Vanc + Zosyn      # PNA  # COVID positive  - See pulm above  - Continue iso for 10 days after positive test per policy     MSK/SKIN:  # Left hand wound  - Wound care consulted    Fluid: FWF @ 300 every 4 hrs   Electrolytes: Replete PRN  Nutrition: Jevity 1.5 @ 50cc/hr    GI ppx: PPI   DVT/PE ppx: Heparin   Abx: Vanc + Zosyn   Drips: None   Lines/Tubes/Drains: Left chest Mediport, left sided midline (from prior to admission), PIV, NGT, Pickett changed (7/14-)   Oxygen: RA    Code: Full code     Dispo: Stable for transfer to any tele floor, meeting with [facility] on Tuesday 7/23. Family wanting to trial SLP, deciding on PEG.     Yohana Jiménez, PGY-3  Internal Medicine   fall precautions/spinal precautions

## 2025-09-01 LAB
Q ONSET: 225 MS
QRS COUNT: 12 BEATS
QRS DURATION: 80 MS
QT INTERVAL: 378 MS
QTC CALCULATION(BAZETT): 405 MS
QTC FREDERICIA: 396 MS
R AXIS: 70 DEGREES
T AXIS: 90 DEGREES
T OFFSET: 414 MS
VENTRICULAR RATE: 69 BPM